# Patient Record
Sex: MALE | Race: WHITE | Employment: FULL TIME | URBAN - METROPOLITAN AREA
[De-identification: names, ages, dates, MRNs, and addresses within clinical notes are randomized per-mention and may not be internally consistent; named-entity substitution may affect disease eponyms.]

---

## 2017-01-09 LAB
CHOLESTEROL, TOTAL: 236 MG/DL
CHOLESTEROL/HDL RATIO: ABNORMAL
HBA1C MFR BLD: 5.7 %
HDLC SERPL-MCNC: 40 MG/DL (ref 35–70)
LDL CHOLESTEROL CALCULATED: 161 MG/DL (ref 0–160)
TRIGL SERPL-MCNC: 174 MG/DL
VLDLC SERPL CALC-MCNC: 35 MG/DL

## 2017-03-13 ENCOUNTER — OFFICE VISIT (OUTPATIENT)
Dept: PRIMARY CARE CLINIC | Age: 47
End: 2017-03-13

## 2017-03-13 VITALS
OXYGEN SATURATION: 98 % | SYSTOLIC BLOOD PRESSURE: 154 MMHG | HEART RATE: 86 BPM | RESPIRATION RATE: 18 BRPM | DIASTOLIC BLOOD PRESSURE: 102 MMHG | BODY MASS INDEX: 32.44 KG/M2 | WEIGHT: 219 LBS | TEMPERATURE: 97.5 F | HEIGHT: 69 IN

## 2017-03-13 DIAGNOSIS — M25.551 HIP PAIN, RIGHT: ICD-10-CM

## 2017-03-13 DIAGNOSIS — I10 ESSENTIAL HYPERTENSION: Primary | ICD-10-CM

## 2017-03-13 PROCEDURE — 99214 OFFICE O/P EST MOD 30 MIN: CPT | Performed by: INTERNAL MEDICINE

## 2017-03-13 PROCEDURE — G8417 CALC BMI ABV UP PARAM F/U: HCPCS | Performed by: INTERNAL MEDICINE

## 2017-03-13 PROCEDURE — 4004F PT TOBACCO SCREEN RCVD TLK: CPT | Performed by: INTERNAL MEDICINE

## 2017-03-13 PROCEDURE — G8484 FLU IMMUNIZE NO ADMIN: HCPCS | Performed by: INTERNAL MEDICINE

## 2017-03-13 PROCEDURE — G8427 DOCREV CUR MEDS BY ELIG CLIN: HCPCS | Performed by: INTERNAL MEDICINE

## 2017-03-13 RX ORDER — OXYCODONE HYDROCHLORIDE AND ACETAMINOPHEN 5; 325 MG/1; MG/1
1 TABLET ORAL EVERY 6 HOURS PRN
Qty: 28 TABLET | Refills: 0 | Status: SHIPPED | OUTPATIENT
Start: 2017-03-13 | End: 2017-03-20

## 2017-03-13 RX ORDER — TELMISARTAN AND HYDROCHLORTHIAZIDE 80; 12.5 MG/1; MG/1
1 TABLET ORAL DAILY
Qty: 90 TABLET | Refills: 3 | Status: SHIPPED | OUTPATIENT
Start: 2017-03-13 | End: 2017-06-02 | Stop reason: SDUPTHER

## 2017-03-15 DIAGNOSIS — J06.9 UPPER RESPIRATORY TRACT INFECTION, UNSPECIFIED TYPE: ICD-10-CM

## 2017-03-15 RX ORDER — AZITHROMYCIN 250 MG/1
TABLET, FILM COATED ORAL
Qty: 1 PACKET | Refills: 0 | Status: SHIPPED | OUTPATIENT
Start: 2017-03-15 | End: 2017-03-25

## 2017-03-15 RX ORDER — AZITHROMYCIN 250 MG/1
TABLET, FILM COATED ORAL
Qty: 1 PACKET | Refills: 0 | OUTPATIENT
Start: 2017-03-15 | End: 2017-03-25

## 2017-03-17 ENCOUNTER — TELEPHONE (OUTPATIENT)
Dept: PRIMARY CARE CLINIC | Age: 47
End: 2017-03-17

## 2017-03-26 ASSESSMENT — ENCOUNTER SYMPTOMS
NAUSEA: 0
TROUBLE SWALLOWING: 0
PHOTOPHOBIA: 0
SHORTNESS OF BREATH: 0
CHOKING: 0
BLURRED VISION: 0
VOICE CHANGE: 0
VOMITING: 0

## 2017-03-28 ENCOUNTER — HOSPITAL ENCOUNTER (OUTPATIENT)
Dept: ORTHOPEDIC SURGERY | Age: 47
Discharge: HOME | End: 2017-03-28
Payer: COMMERCIAL

## 2017-03-28 DIAGNOSIS — M25.552 PAIN IN JOINT, PELVIC REGION AND THIGH, LEFT: ICD-10-CM

## 2017-03-28 PROCEDURE — 73502 X-RAY EXAM HIP UNI 2-3 VIEWS: CPT

## 2017-05-04 ENCOUNTER — HOSPITAL ENCOUNTER (OUTPATIENT)
Dept: PREADMISSION TESTING | Age: 47
Discharge: HOME | End: 2017-05-04
Payer: COMMERCIAL

## 2017-05-04 VITALS
BODY MASS INDEX: 30.99 KG/M2 | DIASTOLIC BLOOD PRESSURE: 80 MMHG | SYSTOLIC BLOOD PRESSURE: 136 MMHG | HEART RATE: 104 BPM | OXYGEN SATURATION: 95 % | TEMPERATURE: 98.2 F | WEIGHT: 209.25 LBS | HEIGHT: 69 IN | RESPIRATION RATE: 16 BRPM

## 2017-05-04 LAB
ABO/RH: NORMAL
ANION GAP SERPL CALCULATED.3IONS-SCNC: 11 MEQ/L (ref 7–13)
ANTIBODY SCREEN: NORMAL
BILIRUBIN URINE: NEGATIVE
BLOOD, URINE: NEGATIVE
BUN BLDV-MCNC: 15 MG/DL (ref 6–20)
CALCIUM SERPL-MCNC: 9.4 MG/DL (ref 8.6–10.2)
CHLORIDE BLD-SCNC: 99 MEQ/L (ref 98–107)
CLARITY: CLEAR
CO2: 28 MEQ/L (ref 22–29)
COLOR: YELLOW
CREAT SERPL-MCNC: 1.24 MG/DL (ref 0.7–1.2)
GFR AFRICAN AMERICAN: >60
GFR NON-AFRICAN AMERICAN: >60
GLUCOSE BLD-MCNC: 85 MG/DL (ref 74–109)
GLUCOSE URINE: NEGATIVE MG/DL
HCT VFR BLD CALC: 54.8 % (ref 42–52)
HEMOGLOBIN: 18.1 G/DL (ref 14–18)
KETONES, URINE: NEGATIVE MG/DL
LEUKOCYTE ESTERASE, URINE: NEGATIVE
MCH RBC QN AUTO: 29.9 PG (ref 27–31.3)
MCHC RBC AUTO-ENTMCNC: 33 % (ref 33–37)
MCV RBC AUTO: 90.8 FL (ref 80–100)
NITRITE, URINE: NEGATIVE
PDW BLD-RTO: 13.6 % (ref 11.5–14.5)
PH UA: 7 (ref 5–9)
PLATELET # BLD: 216 K/UL (ref 130–400)
POTASSIUM SERPL-SCNC: 4.7 MEQ/L (ref 3.5–5.1)
PROTEIN UA: NEGATIVE MG/DL
RBC # BLD: 6.04 M/UL (ref 4.7–6.1)
SODIUM BLD-SCNC: 138 MEQ/L (ref 132–144)
SPECIFIC GRAVITY UA: 1.01 (ref 1–1.03)
URINE REFLEX TO CULTURE: NORMAL
UROBILINOGEN, URINE: 0.2 E.U./DL
WBC # BLD: 7.6 K/UL (ref 4.8–10.8)

## 2017-05-04 PROCEDURE — 85027 COMPLETE CBC AUTOMATED: CPT

## 2017-05-04 PROCEDURE — 86901 BLOOD TYPING SEROLOGIC RH(D): CPT

## 2017-05-04 PROCEDURE — 80048 BASIC METABOLIC PNL TOTAL CA: CPT

## 2017-05-04 PROCEDURE — 93005 ELECTROCARDIOGRAM TRACING: CPT

## 2017-05-04 PROCEDURE — 86900 BLOOD TYPING SEROLOGIC ABO: CPT

## 2017-05-04 PROCEDURE — 86850 RBC ANTIBODY SCREEN: CPT

## 2017-05-04 PROCEDURE — 81003 URINALYSIS AUTO W/O SCOPE: CPT

## 2017-05-04 RX ORDER — TRAMADOL HYDROCHLORIDE 50 MG/1
50 TABLET ORAL EVERY 6 HOURS PRN
Status: ON HOLD | COMMUNITY
End: 2017-05-19 | Stop reason: HOSPADM

## 2017-05-04 RX ORDER — SODIUM CHLORIDE 0.9 % (FLUSH) 0.9 %
10 SYRINGE (ML) INJECTION EVERY 12 HOURS SCHEDULED
Status: CANCELLED | OUTPATIENT
Start: 2017-05-04

## 2017-05-04 RX ORDER — LIDOCAINE HYDROCHLORIDE 10 MG/ML
1 INJECTION, SOLUTION EPIDURAL; INFILTRATION; INTRACAUDAL; PERINEURAL
Status: CANCELLED | OUTPATIENT
Start: 2017-05-04 | End: 2017-05-04

## 2017-05-04 RX ORDER — SODIUM CHLORIDE, SODIUM LACTATE, POTASSIUM CHLORIDE, CALCIUM CHLORIDE 600; 310; 30; 20 MG/100ML; MG/100ML; MG/100ML; MG/100ML
INJECTION, SOLUTION INTRAVENOUS CONTINUOUS
Status: CANCELLED | OUTPATIENT
Start: 2017-05-04

## 2017-05-04 RX ORDER — CETIRIZINE HYDROCHLORIDE 10 MG/1
10 TABLET ORAL DAILY PRN
COMMUNITY
End: 2017-09-15

## 2017-05-04 RX ORDER — SODIUM CHLORIDE 0.9 % (FLUSH) 0.9 %
10 SYRINGE (ML) INJECTION PRN
Status: CANCELLED | OUTPATIENT
Start: 2017-05-04

## 2017-05-05 ENCOUNTER — OFFICE VISIT (OUTPATIENT)
Dept: PRIMARY CARE CLINIC | Age: 47
End: 2017-05-05

## 2017-05-05 VITALS
WEIGHT: 211.9 LBS | TEMPERATURE: 97.6 F | HEART RATE: 78 BPM | HEIGHT: 69 IN | BODY MASS INDEX: 31.39 KG/M2 | SYSTOLIC BLOOD PRESSURE: 148 MMHG | DIASTOLIC BLOOD PRESSURE: 86 MMHG | RESPIRATION RATE: 14 BRPM

## 2017-05-05 DIAGNOSIS — M25.551 HIP PAIN, RIGHT: Primary | ICD-10-CM

## 2017-05-05 DIAGNOSIS — I10 ESSENTIAL HYPERTENSION: ICD-10-CM

## 2017-05-05 DIAGNOSIS — Z01.818 PRE-OP EVALUATION: ICD-10-CM

## 2017-05-05 PROCEDURE — 4004F PT TOBACCO SCREEN RCVD TLK: CPT | Performed by: INTERNAL MEDICINE

## 2017-05-05 PROCEDURE — 99213 OFFICE O/P EST LOW 20 MIN: CPT | Performed by: INTERNAL MEDICINE

## 2017-05-05 PROCEDURE — G8427 DOCREV CUR MEDS BY ELIG CLIN: HCPCS | Performed by: INTERNAL MEDICINE

## 2017-05-05 PROCEDURE — G8417 CALC BMI ABV UP PARAM F/U: HCPCS | Performed by: INTERNAL MEDICINE

## 2017-05-05 ASSESSMENT — PATIENT HEALTH QUESTIONNAIRE - PHQ9
1. LITTLE INTEREST OR PLEASURE IN DOING THINGS: 0
SUM OF ALL RESPONSES TO PHQ QUESTIONS 1-9: 0
SUM OF ALL RESPONSES TO PHQ9 QUESTIONS 1 & 2: 0
2. FEELING DOWN, DEPRESSED OR HOPELESS: 0

## 2017-05-05 ASSESSMENT — ENCOUNTER SYMPTOMS
CHOKING: 0
VOICE CHANGE: 0
TROUBLE SWALLOWING: 0
PHOTOPHOBIA: 0
SHORTNESS OF BREATH: 0
VOMITING: 0
NAUSEA: 0

## 2017-05-06 LAB
EKG ATRIAL RATE: 73 BPM
EKG P AXIS: 39 DEGREES
EKG P-R INTERVAL: 142 MS
EKG Q-T INTERVAL: 350 MS
EKG QRS DURATION: 92 MS
EKG QTC CALCULATION (BAZETT): 385 MS
EKG R AXIS: 28 DEGREES
EKG T AXIS: 43 DEGREES
EKG VENTRICULAR RATE: 73 BPM

## 2017-05-18 ENCOUNTER — PREP FOR PROCEDURE (OUTPATIENT)
Dept: ORTHOPEDIC SURGERY | Age: 47
End: 2017-05-18

## 2017-05-18 ENCOUNTER — ANESTHESIA (OUTPATIENT)
Dept: OPERATING ROOM | Age: 47
DRG: 470 | End: 2017-05-18
Payer: COMMERCIAL

## 2017-05-18 ENCOUNTER — APPOINTMENT (OUTPATIENT)
Dept: GENERAL RADIOLOGY | Age: 47
DRG: 470 | End: 2017-05-18
Attending: ORTHOPAEDIC SURGERY
Payer: COMMERCIAL

## 2017-05-18 ENCOUNTER — ANESTHESIA EVENT (OUTPATIENT)
Dept: OPERATING ROOM | Age: 47
DRG: 470 | End: 2017-05-18
Payer: COMMERCIAL

## 2017-05-18 ENCOUNTER — HOSPITAL ENCOUNTER (INPATIENT)
Age: 47
LOS: 1 days | Discharge: HOME | DRG: 470 | End: 2017-05-19
Attending: ORTHOPAEDIC SURGERY | Admitting: ORTHOPAEDIC SURGERY
Payer: COMMERCIAL

## 2017-05-18 VITALS — DIASTOLIC BLOOD PRESSURE: 48 MMHG | OXYGEN SATURATION: 99 % | SYSTOLIC BLOOD PRESSURE: 99 MMHG

## 2017-05-18 DIAGNOSIS — N17.9 AKI (ACUTE KIDNEY INJURY) (HCC): Primary | ICD-10-CM

## 2017-05-18 LAB
ABO/RH: NORMAL
ABO/RH: NORMAL
ANTIBODY SCREEN: NORMAL

## 2017-05-18 PROCEDURE — 87086 URINE CULTURE/COLONY COUNT: CPT

## 2017-05-18 PROCEDURE — 2580000003 HC RX 258: Performed by: ORTHOPAEDIC SURGERY

## 2017-05-18 PROCEDURE — 3600000004 HC SURGERY LEVEL 4 BASE: Performed by: ORTHOPAEDIC SURGERY

## 2017-05-18 PROCEDURE — 7100000001 HC PACU RECOVERY - ADDTL 15 MIN: Performed by: ORTHOPAEDIC SURGERY

## 2017-05-18 PROCEDURE — 1210000000 HC MED SURG R&B

## 2017-05-18 PROCEDURE — G8988 SELF CARE GOAL STATUS: HCPCS

## 2017-05-18 PROCEDURE — 2580000003 HC RX 258: Performed by: NURSE PRACTITIONER

## 2017-05-18 PROCEDURE — C1776 JOINT DEVICE (IMPLANTABLE): HCPCS | Performed by: ORTHOPAEDIC SURGERY

## 2017-05-18 PROCEDURE — 3600000014 HC SURGERY LEVEL 4 ADDTL 15MIN: Performed by: ORTHOPAEDIC SURGERY

## 2017-05-18 PROCEDURE — 0SR904A REPLACEMENT OF RIGHT HIP JOINT WITH CERAMIC ON POLYETHYLENE SYNTHETIC SUBSTITUTE, UNCEMENTED, OPEN APPROACH: ICD-10-PCS | Performed by: ORTHOPAEDIC SURGERY

## 2017-05-18 PROCEDURE — G8979 MOBILITY GOAL STATUS: HCPCS

## 2017-05-18 PROCEDURE — 93005 ELECTROCARDIOGRAM TRACING: CPT

## 2017-05-18 PROCEDURE — 86850 RBC ANTIBODY SCREEN: CPT

## 2017-05-18 PROCEDURE — 6360000002 HC RX W HCPCS: Performed by: ANESTHESIOLOGY

## 2017-05-18 PROCEDURE — G8987 SELF CARE CURRENT STATUS: HCPCS

## 2017-05-18 PROCEDURE — 6360000002 HC RX W HCPCS: Performed by: ORTHOPAEDIC SURGERY

## 2017-05-18 PROCEDURE — 7100000000 HC PACU RECOVERY - FIRST 15 MIN: Performed by: ORTHOPAEDIC SURGERY

## 2017-05-18 PROCEDURE — 2500000003 HC RX 250 WO HCPCS: Performed by: NURSE PRACTITIONER

## 2017-05-18 PROCEDURE — 2500000003 HC RX 250 WO HCPCS: Performed by: ORTHOPAEDIC SURGERY

## 2017-05-18 PROCEDURE — 2580000003 HC RX 258: Performed by: STUDENT IN AN ORGANIZED HEALTH CARE EDUCATION/TRAINING PROGRAM

## 2017-05-18 PROCEDURE — 73501 X-RAY EXAM HIP UNI 1 VIEW: CPT

## 2017-05-18 PROCEDURE — 86923 COMPATIBILITY TEST ELECTRIC: CPT

## 2017-05-18 PROCEDURE — 2500000003 HC RX 250 WO HCPCS: Performed by: ANESTHESIOLOGY

## 2017-05-18 PROCEDURE — 6370000000 HC RX 637 (ALT 250 FOR IP): Performed by: ORTHOPAEDIC SURGERY

## 2017-05-18 PROCEDURE — 6360000002 HC RX W HCPCS: Performed by: PHYSICIAN ASSISTANT

## 2017-05-18 PROCEDURE — 86900 BLOOD TYPING SEROLOGIC ABO: CPT

## 2017-05-18 PROCEDURE — 97166 OT EVAL MOD COMPLEX 45 MIN: CPT

## 2017-05-18 PROCEDURE — 3700000001 HC ADD 15 MINUTES (ANESTHESIA): Performed by: ORTHOPAEDIC SURGERY

## 2017-05-18 PROCEDURE — 2500000003 HC RX 250 WO HCPCS: Performed by: STUDENT IN AN ORGANIZED HEALTH CARE EDUCATION/TRAINING PROGRAM

## 2017-05-18 PROCEDURE — 86901 BLOOD TYPING SEROLOGIC RH(D): CPT

## 2017-05-18 PROCEDURE — 6360000002 HC RX W HCPCS: Performed by: NURSE PRACTITIONER

## 2017-05-18 PROCEDURE — G8978 MOBILITY CURRENT STATUS: HCPCS

## 2017-05-18 PROCEDURE — 3700000000 HC ANESTHESIA ATTENDED CARE: Performed by: ORTHOPAEDIC SURGERY

## 2017-05-18 PROCEDURE — 97161 PT EVAL LOW COMPLEX 20 MIN: CPT

## 2017-05-18 PROCEDURE — 2720619600 HC DRESSING WOUND 2X2IN PAD CALC ALGINATE: Performed by: ORTHOPAEDIC SURGERY

## 2017-05-18 DEVICE — INSERT ACET OD52MM ID28MM HIP X3 2 MOBILITY RESTR MOB BEAR: Type: IMPLANTABLE DEVICE | Site: HIP | Status: FUNCTIONAL

## 2017-05-18 DEVICE — STEM FEM SZ 5 L108MM NK L35MM 44MM OFFSET 127DEG HIP TI: Type: IMPLANTABLE DEVICE | Site: HIP | Status: FUNCTIONAL

## 2017-05-18 DEVICE — LINER ACET SZ F ID46MM HIP X3 CO CHROM CEMENTLESS MOD 2: Type: IMPLANTABLE DEVICE | Site: HIP | Status: FUNCTIONAL

## 2017-05-18 DEVICE — SHELL ACET SZ F DIA56MM HIP TRIDENT HA CLUS H HMSPHR MOD 2: Type: IMPLANTABLE DEVICE | Site: HIP | Status: FUNCTIONAL

## 2017-05-18 DEVICE — HEAD FEM DIA28MM +0MM OFFSET HIP BIOLOX DELT CERAMIC TAPR: Type: IMPLANTABLE DEVICE | Site: HIP | Status: FUNCTIONAL

## 2017-05-18 RX ORDER — MIDAZOLAM HYDROCHLORIDE 1 MG/ML
INJECTION INTRAMUSCULAR; INTRAVENOUS PRN
Status: DISCONTINUED | OUTPATIENT
Start: 2017-05-18 | End: 2017-05-18 | Stop reason: SDUPTHER

## 2017-05-18 RX ORDER — ACETAMINOPHEN 325 MG/1
325 TABLET ORAL EVERY 4 HOURS PRN
Status: DISCONTINUED | OUTPATIENT
Start: 2017-05-18 | End: 2017-05-18 | Stop reason: HOSPADM

## 2017-05-18 RX ORDER — FENTANYL CITRATE 50 UG/ML
50 INJECTION, SOLUTION INTRAMUSCULAR; INTRAVENOUS EVERY 10 MIN PRN
Status: DISCONTINUED | OUTPATIENT
Start: 2017-05-18 | End: 2017-05-18 | Stop reason: HOSPADM

## 2017-05-18 RX ORDER — ONDANSETRON 2 MG/ML
4 INJECTION INTRAMUSCULAR; INTRAVENOUS
Status: COMPLETED | OUTPATIENT
Start: 2017-05-18 | End: 2017-05-18

## 2017-05-18 RX ORDER — DIPHENHYDRAMINE HYDROCHLORIDE 50 MG/ML
50 INJECTION INTRAMUSCULAR; INTRAVENOUS EVERY 6 HOURS PRN
Status: DISCONTINUED | OUTPATIENT
Start: 2017-05-18 | End: 2017-05-19 | Stop reason: HOSPADM

## 2017-05-18 RX ORDER — METOCLOPRAMIDE HYDROCHLORIDE 5 MG/ML
10 INJECTION INTRAMUSCULAR; INTRAVENOUS
Status: DISCONTINUED | OUTPATIENT
Start: 2017-05-18 | End: 2017-05-18 | Stop reason: HOSPADM

## 2017-05-18 RX ORDER — SENNA AND DOCUSATE SODIUM 50; 8.6 MG/1; MG/1
1 TABLET, FILM COATED ORAL DAILY
Status: DISCONTINUED | OUTPATIENT
Start: 2017-05-18 | End: 2017-05-19 | Stop reason: HOSPADM

## 2017-05-18 RX ORDER — SODIUM CHLORIDE 0.9 % (FLUSH) 0.9 %
10 SYRINGE (ML) INJECTION PRN
Status: DISCONTINUED | OUTPATIENT
Start: 2017-05-18 | End: 2017-05-18 | Stop reason: HOSPADM

## 2017-05-18 RX ORDER — DOCUSATE SODIUM 100 MG/1
100 CAPSULE, LIQUID FILLED ORAL 2 TIMES DAILY
Status: DISCONTINUED | OUTPATIENT
Start: 2017-05-18 | End: 2017-05-19 | Stop reason: HOSPADM

## 2017-05-18 RX ORDER — SODIUM CHLORIDE 0.9 % (FLUSH) 0.9 %
10 SYRINGE (ML) INJECTION EVERY 12 HOURS SCHEDULED
Status: DISCONTINUED | OUTPATIENT
Start: 2017-05-18 | End: 2017-05-18 | Stop reason: HOSPADM

## 2017-05-18 RX ORDER — ONDANSETRON 2 MG/ML
4 INJECTION INTRAMUSCULAR; INTRAVENOUS EVERY 6 HOURS PRN
Status: CANCELLED | OUTPATIENT
Start: 2017-05-18

## 2017-05-18 RX ORDER — SENNA AND DOCUSATE SODIUM 50; 8.6 MG/1; MG/1
1 TABLET, FILM COATED ORAL DAILY
Status: CANCELLED | OUTPATIENT
Start: 2017-05-18

## 2017-05-18 RX ORDER — MORPHINE SULFATE 2 MG/ML
4 INJECTION, SOLUTION INTRAMUSCULAR; INTRAVENOUS
Status: CANCELLED | OUTPATIENT
Start: 2017-05-18

## 2017-05-18 RX ORDER — HYDRALAZINE HYDROCHLORIDE 20 MG/ML
10 INJECTION INTRAMUSCULAR; INTRAVENOUS EVERY 4 HOURS PRN
Status: DISCONTINUED | OUTPATIENT
Start: 2017-05-18 | End: 2017-05-19 | Stop reason: HOSPADM

## 2017-05-18 RX ORDER — PHENYLEPHRINE HYDROCHLORIDE 10 MG/ML
INJECTION INTRAVENOUS PRN
Status: DISCONTINUED | OUTPATIENT
Start: 2017-05-18 | End: 2017-05-18 | Stop reason: SDUPTHER

## 2017-05-18 RX ORDER — FLUTICASONE PROPIONATE 50 MCG
1 SPRAY, SUSPENSION (ML) NASAL DAILY
Status: DISCONTINUED | OUTPATIENT
Start: 2017-05-18 | End: 2017-05-19 | Stop reason: HOSPADM

## 2017-05-18 RX ORDER — SODIUM CHLORIDE, SODIUM LACTATE, POTASSIUM CHLORIDE, CALCIUM CHLORIDE 600; 310; 30; 20 MG/100ML; MG/100ML; MG/100ML; MG/100ML
INJECTION, SOLUTION INTRAVENOUS CONTINUOUS
Status: DISCONTINUED | OUTPATIENT
Start: 2017-05-18 | End: 2017-05-18

## 2017-05-18 RX ORDER — BUPIVACAINE HYDROCHLORIDE 5 MG/ML
INJECTION, SOLUTION EPIDURAL; INTRACAUDAL PRN
Status: DISCONTINUED | OUTPATIENT
Start: 2017-05-18 | End: 2017-05-18 | Stop reason: SDUPTHER

## 2017-05-18 RX ORDER — PROPOFOL 10 MG/ML
INJECTION, EMULSION INTRAVENOUS CONTINUOUS PRN
Status: DISCONTINUED | OUTPATIENT
Start: 2017-05-18 | End: 2017-05-18 | Stop reason: SDUPTHER

## 2017-05-18 RX ORDER — LIDOCAINE HYDROCHLORIDE 10 MG/ML
1 INJECTION, SOLUTION EPIDURAL; INFILTRATION; INTRACAUDAL; PERINEURAL
Status: COMPLETED | OUTPATIENT
Start: 2017-05-18 | End: 2017-05-18

## 2017-05-18 RX ORDER — NALOXONE HYDROCHLORIDE 0.4 MG/ML
0.4 INJECTION, SOLUTION INTRAMUSCULAR; INTRAVENOUS; SUBCUTANEOUS PRN
Status: DISCONTINUED | OUTPATIENT
Start: 2017-05-18 | End: 2017-05-18

## 2017-05-18 RX ORDER — KETOROLAC TROMETHAMINE 30 MG/ML
30 INJECTION, SOLUTION INTRAMUSCULAR; INTRAVENOUS EVERY 6 HOURS
Status: COMPLETED | OUTPATIENT
Start: 2017-05-18 | End: 2017-05-19

## 2017-05-18 RX ORDER — OXYCODONE HYDROCHLORIDE AND ACETAMINOPHEN 5; 325 MG/1; MG/1
1 TABLET ORAL EVERY 4 HOURS PRN
Status: CANCELLED | OUTPATIENT
Start: 2017-05-18

## 2017-05-18 RX ORDER — ACETAMINOPHEN 325 MG/1
500 TABLET ORAL EVERY 6 HOURS PRN
Status: CANCELLED | OUTPATIENT
Start: 2017-05-18

## 2017-05-18 RX ORDER — MORPHINE SULFATE 4 MG/ML
4 INJECTION, SOLUTION INTRAMUSCULAR; INTRAVENOUS
Status: DISCONTINUED | OUTPATIENT
Start: 2017-05-18 | End: 2017-05-19

## 2017-05-18 RX ORDER — ONDANSETRON 2 MG/ML
4 INJECTION INTRAMUSCULAR; INTRAVENOUS EVERY 6 HOURS PRN
Status: DISCONTINUED | OUTPATIENT
Start: 2017-05-18 | End: 2017-05-18 | Stop reason: HOSPADM

## 2017-05-18 RX ORDER — OXYCODONE HYDROCHLORIDE AND ACETAMINOPHEN 5; 325 MG/1; MG/1
1 TABLET ORAL EVERY 4 HOURS PRN
Status: DISCONTINUED | OUTPATIENT
Start: 2017-05-18 | End: 2017-05-19 | Stop reason: HOSPADM

## 2017-05-18 RX ORDER — HYDROCODONE BITARTRATE AND ACETAMINOPHEN 5; 325 MG/1; MG/1
1 TABLET ORAL PRN
Status: DISCONTINUED | OUTPATIENT
Start: 2017-05-18 | End: 2017-05-18 | Stop reason: HOSPADM

## 2017-05-18 RX ORDER — DIPHENHYDRAMINE HYDROCHLORIDE 50 MG/ML
12.5 INJECTION INTRAMUSCULAR; INTRAVENOUS
Status: DISCONTINUED | OUTPATIENT
Start: 2017-05-18 | End: 2017-05-18 | Stop reason: HOSPADM

## 2017-05-18 RX ORDER — DOCUSATE SODIUM 100 MG/1
100 CAPSULE, LIQUID FILLED ORAL 2 TIMES DAILY
Status: CANCELLED | OUTPATIENT
Start: 2017-05-18

## 2017-05-18 RX ORDER — MORPHINE SULFATE 2 MG/ML
2 INJECTION, SOLUTION INTRAMUSCULAR; INTRAVENOUS
Status: CANCELLED | OUTPATIENT
Start: 2017-05-18

## 2017-05-18 RX ORDER — HYDROCODONE BITARTRATE AND ACETAMINOPHEN 5; 325 MG/1; MG/1
2 TABLET ORAL PRN
Status: DISCONTINUED | OUTPATIENT
Start: 2017-05-18 | End: 2017-05-18 | Stop reason: HOSPADM

## 2017-05-18 RX ORDER — ASPIRIN 81 MG/1
81 TABLET ORAL 2 TIMES DAILY
Status: DISCONTINUED | OUTPATIENT
Start: 2017-05-18 | End: 2017-05-19 | Stop reason: HOSPADM

## 2017-05-18 RX ORDER — SODIUM CHLORIDE 9 MG/ML
INJECTION, SOLUTION INTRAVENOUS CONTINUOUS
Status: DISCONTINUED | OUTPATIENT
Start: 2017-05-18 | End: 2017-05-19 | Stop reason: HOSPADM

## 2017-05-18 RX ORDER — MORPHINE SULFATE 2 MG/ML
2 INJECTION, SOLUTION INTRAMUSCULAR; INTRAVENOUS
Status: DISCONTINUED | OUTPATIENT
Start: 2017-05-18 | End: 2017-05-19

## 2017-05-18 RX ORDER — PROPOFOL 10 MG/ML
INJECTION, EMULSION INTRAVENOUS PRN
Status: DISCONTINUED | OUTPATIENT
Start: 2017-05-18 | End: 2017-05-18 | Stop reason: SDUPTHER

## 2017-05-18 RX ORDER — SODIUM CHLORIDE 9 MG/ML
INJECTION, SOLUTION INTRAVENOUS CONTINUOUS
Status: CANCELLED | OUTPATIENT
Start: 2017-05-18

## 2017-05-18 RX ORDER — ONDANSETRON 2 MG/ML
4 INJECTION INTRAMUSCULAR; INTRAVENOUS EVERY 6 HOURS PRN
Status: DISCONTINUED | OUTPATIENT
Start: 2017-05-18 | End: 2017-05-19 | Stop reason: HOSPADM

## 2017-05-18 RX ORDER — NALBUPHINE HCL 10 MG/ML
5 AMPUL (ML) INJECTION EVERY 4 HOURS PRN
Status: DISCONTINUED | OUTPATIENT
Start: 2017-05-18 | End: 2017-05-18 | Stop reason: HOSPADM

## 2017-05-18 RX ORDER — KETOROLAC TROMETHAMINE 30 MG/ML
30 INJECTION, SOLUTION INTRAMUSCULAR; INTRAVENOUS EVERY 6 HOURS
Status: DISCONTINUED | OUTPATIENT
Start: 2017-05-18 | End: 2017-05-18 | Stop reason: HOSPADM

## 2017-05-18 RX ORDER — GLYCOPYRROLATE 0.2 MG/ML
0.2 INJECTION INTRAMUSCULAR; INTRAVENOUS ONCE
Status: COMPLETED | OUTPATIENT
Start: 2017-05-18 | End: 2017-05-18

## 2017-05-18 RX ORDER — SODIUM CHLORIDE 0.9 % (FLUSH) 0.9 %
10 SYRINGE (ML) INJECTION EVERY 12 HOURS SCHEDULED
Status: DISCONTINUED | OUTPATIENT
Start: 2017-05-18 | End: 2017-05-19 | Stop reason: HOSPADM

## 2017-05-18 RX ORDER — CETIRIZINE HYDROCHLORIDE 10 MG/1
10 TABLET ORAL DAILY PRN
Status: DISCONTINUED | OUTPATIENT
Start: 2017-05-18 | End: 2017-05-19 | Stop reason: HOSPADM

## 2017-05-18 RX ORDER — MORPHINE SULFATE 1 MG/ML
INJECTION, SOLUTION EPIDURAL; INTRATHECAL; INTRAVENOUS PRN
Status: DISCONTINUED | OUTPATIENT
Start: 2017-05-18 | End: 2017-05-18 | Stop reason: SDUPTHER

## 2017-05-18 RX ORDER — MEPERIDINE HYDROCHLORIDE 25 MG/ML
12.5 INJECTION INTRAMUSCULAR; INTRAVENOUS; SUBCUTANEOUS EVERY 5 MIN PRN
Status: DISCONTINUED | OUTPATIENT
Start: 2017-05-18 | End: 2017-05-18 | Stop reason: HOSPADM

## 2017-05-18 RX ORDER — DIPHENHYDRAMINE HCL 25 MG
25 TABLET ORAL EVERY 6 HOURS PRN
Status: DISCONTINUED | OUTPATIENT
Start: 2017-05-18 | End: 2017-05-18 | Stop reason: HOSPADM

## 2017-05-18 RX ORDER — ACETAMINOPHEN 500 MG
500 TABLET ORAL EVERY 6 HOURS PRN
Status: DISCONTINUED | OUTPATIENT
Start: 2017-05-18 | End: 2017-05-19 | Stop reason: HOSPADM

## 2017-05-18 RX ORDER — SODIUM CHLORIDE 0.9 % (FLUSH) 0.9 %
10 SYRINGE (ML) INJECTION EVERY 12 HOURS SCHEDULED
Status: CANCELLED | OUTPATIENT
Start: 2017-05-18

## 2017-05-18 RX ORDER — SODIUM CHLORIDE 0.9 % (FLUSH) 0.9 %
10 SYRINGE (ML) INJECTION PRN
Status: CANCELLED | OUTPATIENT
Start: 2017-05-18

## 2017-05-18 RX ORDER — OXYCODONE HYDROCHLORIDE AND ACETAMINOPHEN 5; 325 MG/1; MG/1
2 TABLET ORAL EVERY 4 HOURS PRN
Status: DISCONTINUED | OUTPATIENT
Start: 2017-05-18 | End: 2017-05-19 | Stop reason: HOSPADM

## 2017-05-18 RX ORDER — SODIUM CHLORIDE 0.9 % (FLUSH) 0.9 %
10 SYRINGE (ML) INJECTION PRN
Status: DISCONTINUED | OUTPATIENT
Start: 2017-05-18 | End: 2017-05-19 | Stop reason: HOSPADM

## 2017-05-18 RX ORDER — OXYCODONE HYDROCHLORIDE AND ACETAMINOPHEN 5; 325 MG/1; MG/1
2 TABLET ORAL EVERY 4 HOURS PRN
Status: CANCELLED | OUTPATIENT
Start: 2017-05-18

## 2017-05-18 RX ADMIN — Medication 2 G: at 15:41

## 2017-05-18 RX ADMIN — SODIUM CHLORIDE, POTASSIUM CHLORIDE, SODIUM LACTATE AND CALCIUM CHLORIDE 1000 ML: 600; 310; 30; 20 INJECTION, SOLUTION INTRAVENOUS at 06:56

## 2017-05-18 RX ADMIN — Medication 2 G: at 07:38

## 2017-05-18 RX ADMIN — Medication 2 G: at 23:32

## 2017-05-18 RX ADMIN — ASPIRIN 81 MG: 81 TABLET, COATED ORAL at 20:58

## 2017-05-18 RX ADMIN — BUPIVACAINE HYDROCHLORIDE 3 ML: 5 INJECTION, SOLUTION EPIDURAL; INTRACAUDAL; PERINEURAL at 07:26

## 2017-05-18 RX ADMIN — SODIUM CHLORIDE: 9 INJECTION, SOLUTION INTRAVENOUS at 12:20

## 2017-05-18 RX ADMIN — KETOROLAC TROMETHAMINE 30 MG: 30 INJECTION, SOLUTION INTRAMUSCULAR at 12:57

## 2017-05-18 RX ADMIN — SODIUM CHLORIDE, PRESERVATIVE FREE 10 ML: 5 INJECTION INTRAVENOUS at 20:59

## 2017-05-18 RX ADMIN — Medication 0.2 MG: at 07:59

## 2017-05-18 RX ADMIN — Medication 0.2 MG: at 08:16

## 2017-05-18 RX ADMIN — Medication 0.2 MG: at 07:47

## 2017-05-18 RX ADMIN — DIPHENHYDRAMINE HYDROCHLORIDE 50 MG: 50 INJECTION, SOLUTION INTRAMUSCULAR; INTRAVENOUS at 12:21

## 2017-05-18 RX ADMIN — OXYCODONE HYDROCHLORIDE AND ACETAMINOPHEN 2 TABLET: 5; 325 TABLET ORAL at 16:18

## 2017-05-18 RX ADMIN — ONDANSETRON 4 MG: 2 INJECTION, SOLUTION INTRAMUSCULAR; INTRAVENOUS at 07:40

## 2017-05-18 RX ADMIN — POLYMYXIN B: 500000 INJECTION, POWDER, LYOPHILIZED, FOR SOLUTION INTRAMUSCULAR; INTRATHECAL; INTRAVENOUS; OPHTHALMIC at 07:45

## 2017-05-18 RX ADMIN — TRANEXAMIC ACID 1 G: 1 INJECTION, SOLUTION INTRAVENOUS at 07:32

## 2017-05-18 RX ADMIN — KETOROLAC TROMETHAMINE 30 MG: 30 INJECTION, SOLUTION INTRAMUSCULAR at 19:05

## 2017-05-18 RX ADMIN — PROPOFOL 100 MCG/KG/MIN: 10 INJECTION, EMULSION INTRAVENOUS at 07:40

## 2017-05-18 RX ADMIN — PHENYLEPHRINE HYDROCHLORIDE 200 MCG: 10 INJECTION INTRAVENOUS at 08:20

## 2017-05-18 RX ADMIN — OXYCODONE HYDROCHLORIDE AND ACETAMINOPHEN 2 TABLET: 5; 325 TABLET ORAL at 12:23

## 2017-05-18 RX ADMIN — DOCUSATE SODIUM AND SENNOSIDES 1 TABLET: 8.6; 5 TABLET, FILM COATED ORAL at 17:30

## 2017-05-18 RX ADMIN — DOCUSATE SODIUM 100 MG: 100 CAPSULE ORAL at 20:58

## 2017-05-18 RX ADMIN — TRANEXAMIC ACID 1000 MG: 1 INJECTION, SOLUTION INTRAVENOUS at 09:50

## 2017-05-18 RX ADMIN — GLYCOPYRROLATE 0.2 MG: 0.2 INJECTION INTRAMUSCULAR; INTRAVENOUS at 09:38

## 2017-05-18 RX ADMIN — Medication 0.2 MG: at 08:08

## 2017-05-18 RX ADMIN — ASPIRIN 81 MG: 81 TABLET, COATED ORAL at 17:31

## 2017-05-18 RX ADMIN — OXYCODONE HYDROCHLORIDE AND ACETAMINOPHEN 2 TABLET: 5; 325 TABLET ORAL at 20:58

## 2017-05-18 RX ADMIN — PROPOFOL 80 MG: 10 INJECTION, EMULSION INTRAVENOUS at 07:33

## 2017-05-18 RX ADMIN — MIDAZOLAM HYDROCHLORIDE 2 MG: 1 INJECTION, SOLUTION INTRAMUSCULAR; INTRAVENOUS at 07:24

## 2017-05-18 RX ADMIN — DOCUSATE SODIUM 100 MG: 100 CAPSULE ORAL at 17:31

## 2017-05-18 RX ADMIN — MORPHINE SULFATE 0.2 MG: 1 INJECTION EPIDURAL; INTRATHECAL; INTRAVENOUS at 07:26

## 2017-05-18 RX ADMIN — FENTANYL CITRATE 100 MCG: 50 INJECTION, SOLUTION INTRAMUSCULAR; INTRAVENOUS at 07:42

## 2017-05-18 RX ADMIN — SODIUM CHLORIDE, POTASSIUM CHLORIDE, SODIUM LACTATE AND CALCIUM CHLORIDE: 600; 310; 30; 20 INJECTION, SOLUTION INTRAVENOUS at 07:49

## 2017-05-18 RX ADMIN — LIDOCAINE HYDROCHLORIDE 1 ML: 10 INJECTION, SOLUTION EPIDURAL; INFILTRATION; INTRACAUDAL; PERINEURAL at 06:55

## 2017-05-18 ASSESSMENT — PAIN SCALES - GENERAL
PAINLEVEL_OUTOF10: 4
PAINLEVEL_OUTOF10: 0
PAINLEVEL_OUTOF10: 4
PAINLEVEL_OUTOF10: 7
PAINLEVEL_OUTOF10: 0
PAINLEVEL_OUTOF10: 7
PAINLEVEL_OUTOF10: 6

## 2017-05-18 ASSESSMENT — PAIN DESCRIPTION - FREQUENCY
FREQUENCY: CONTINUOUS
FREQUENCY: INTERMITTENT

## 2017-05-18 ASSESSMENT — PAIN DESCRIPTION - LOCATION
LOCATION: HIP
LOCATION: HIP

## 2017-05-18 ASSESSMENT — PAIN DESCRIPTION - PROGRESSION
CLINICAL_PROGRESSION: GRADUALLY WORSENING
CLINICAL_PROGRESSION: NOT CHANGED

## 2017-05-18 ASSESSMENT — PAIN DESCRIPTION - ONSET: ONSET: ON-GOING

## 2017-05-18 ASSESSMENT — PAIN DESCRIPTION - DESCRIPTORS
DESCRIPTORS: ACHING;BURNING;CONSTANT
DESCRIPTORS: CONSTANT;PENETRATING
DESCRIPTORS: ACHING

## 2017-05-18 ASSESSMENT — PAIN DESCRIPTION - ORIENTATION
ORIENTATION: RIGHT
ORIENTATION: RIGHT

## 2017-05-18 ASSESSMENT — PAIN - FUNCTIONAL ASSESSMENT: PAIN_FUNCTIONAL_ASSESSMENT: 0-10

## 2017-05-18 ASSESSMENT — PAIN DESCRIPTION - PAIN TYPE
TYPE: SURGICAL PAIN
TYPE: SURGICAL PAIN

## 2017-05-19 VITALS
SYSTOLIC BLOOD PRESSURE: 157 MMHG | HEART RATE: 103 BPM | RESPIRATION RATE: 16 BRPM | DIASTOLIC BLOOD PRESSURE: 69 MMHG | HEIGHT: 69 IN | BODY MASS INDEX: 30.99 KG/M2 | TEMPERATURE: 99.5 F | OXYGEN SATURATION: 94 % | WEIGHT: 209.25 LBS

## 2017-05-19 LAB
ANION GAP SERPL CALCULATED.3IONS-SCNC: 12 MEQ/L (ref 7–13)
ANION GAP SERPL CALCULATED.3IONS-SCNC: 8 MEQ/L (ref 7–13)
BUN BLDV-MCNC: 17 MG/DL (ref 6–20)
BUN BLDV-MCNC: 18 MG/DL (ref 6–20)
CALCIUM SERPL-MCNC: 7.9 MG/DL (ref 8.6–10.2)
CALCIUM SERPL-MCNC: 8.2 MG/DL (ref 8.6–10.2)
CHLORIDE BLD-SCNC: 101 MEQ/L (ref 98–107)
CHLORIDE BLD-SCNC: 104 MEQ/L (ref 98–107)
CO2: 23 MEQ/L (ref 22–29)
CO2: 26 MEQ/L (ref 22–29)
CREAT SERPL-MCNC: 1.51 MG/DL (ref 0.7–1.2)
CREAT SERPL-MCNC: 1.55 MG/DL (ref 0.7–1.2)
GFR AFRICAN AMERICAN: 58.4
GFR AFRICAN AMERICAN: >60
GFR NON-AFRICAN AMERICAN: 48.2
GFR NON-AFRICAN AMERICAN: 49.7
GLUCOSE BLD-MCNC: 107 MG/DL (ref 74–109)
GLUCOSE BLD-MCNC: 109 MG/DL (ref 74–109)
HCT VFR BLD CALC: 44.3 % (ref 42–52)
HEMOGLOBIN: 15.1 G/DL (ref 14–18)
MAGNESIUM: 1.8 MG/DL (ref 1.7–2.3)
MCH RBC QN AUTO: 30.1 PG (ref 27–31.3)
MCHC RBC AUTO-ENTMCNC: 34 % (ref 33–37)
MCV RBC AUTO: 88.4 FL (ref 80–100)
PDW BLD-RTO: 13.9 % (ref 11.5–14.5)
PLATELET # BLD: 169 K/UL (ref 130–400)
POTASSIUM SERPL-SCNC: 4 MEQ/L (ref 3.5–5.1)
POTASSIUM SERPL-SCNC: 4.5 MEQ/L (ref 3.5–5.1)
RBC # BLD: 5.02 M/UL (ref 4.7–6.1)
SODIUM BLD-SCNC: 136 MEQ/L (ref 132–144)
SODIUM BLD-SCNC: 138 MEQ/L (ref 132–144)
URINE CULTURE, ROUTINE: NORMAL
WBC # BLD: 9.5 K/UL (ref 4.8–10.8)

## 2017-05-19 PROCEDURE — 97116 GAIT TRAINING THERAPY: CPT

## 2017-05-19 PROCEDURE — 97110 THERAPEUTIC EXERCISES: CPT

## 2017-05-19 PROCEDURE — 85027 COMPLETE CBC AUTOMATED: CPT

## 2017-05-19 PROCEDURE — 6370000000 HC RX 637 (ALT 250 FOR IP): Performed by: NURSE PRACTITIONER

## 2017-05-19 PROCEDURE — 6360000002 HC RX W HCPCS: Performed by: NURSE PRACTITIONER

## 2017-05-19 PROCEDURE — 36415 COLL VENOUS BLD VENIPUNCTURE: CPT

## 2017-05-19 PROCEDURE — 6370000000 HC RX 637 (ALT 250 FOR IP): Performed by: ORTHOPAEDIC SURGERY

## 2017-05-19 PROCEDURE — 97535 SELF CARE MNGMENT TRAINING: CPT

## 2017-05-19 PROCEDURE — 83735 ASSAY OF MAGNESIUM: CPT

## 2017-05-19 PROCEDURE — 80048 BASIC METABOLIC PNL TOTAL CA: CPT

## 2017-05-19 RX ORDER — TIZANIDINE 2 MG/1
2 TABLET ORAL EVERY 6 HOURS PRN
Qty: 24 TABLET | Refills: 0 | Status: SHIPPED | OUTPATIENT
Start: 2017-05-19 | End: 2017-09-15

## 2017-05-19 RX ORDER — OXYCODONE HYDROCHLORIDE AND ACETAMINOPHEN 5; 325 MG/1; MG/1
1 TABLET ORAL EVERY 4 HOURS PRN
Qty: 60 TABLET | Refills: 0 | Status: SHIPPED | OUTPATIENT
Start: 2017-05-19 | End: 2017-05-26

## 2017-05-19 RX ORDER — ASPIRIN 81 MG/1
81 TABLET ORAL 2 TIMES DAILY
Qty: 60 TABLET | Refills: 0 | Status: SHIPPED | OUTPATIENT
Start: 2017-05-19 | End: 2017-09-15

## 2017-05-19 RX ORDER — PSEUDOEPHEDRINE HCL 30 MG
100 TABLET ORAL 2 TIMES DAILY
Qty: 60 CAPSULE | Refills: 0 | Status: SHIPPED | OUTPATIENT
Start: 2017-05-19 | End: 2017-09-15

## 2017-05-19 RX ORDER — TIZANIDINE 2 MG/1
2 TABLET ORAL EVERY 6 HOURS PRN
Status: DISCONTINUED | OUTPATIENT
Start: 2017-05-19 | End: 2017-05-19 | Stop reason: HOSPADM

## 2017-05-19 RX ADMIN — OXYCODONE HYDROCHLORIDE AND ACETAMINOPHEN 2 TABLET: 5; 325 TABLET ORAL at 17:00

## 2017-05-19 RX ADMIN — OXYCODONE HYDROCHLORIDE AND ACETAMINOPHEN 2 TABLET: 5; 325 TABLET ORAL at 02:57

## 2017-05-19 RX ADMIN — OXYCODONE HYDROCHLORIDE AND ACETAMINOPHEN 2 TABLET: 5; 325 TABLET ORAL at 13:02

## 2017-05-19 RX ADMIN — TIZANIDINE 2 MG: 2 TABLET ORAL at 10:12

## 2017-05-19 RX ADMIN — DOCUSATE SODIUM AND SENNOSIDES 1 TABLET: 8.6; 5 TABLET, FILM COATED ORAL at 08:22

## 2017-05-19 RX ADMIN — ASPIRIN 81 MG: 81 TABLET, COATED ORAL at 08:22

## 2017-05-19 RX ADMIN — KETOROLAC TROMETHAMINE 30 MG: 30 INJECTION, SOLUTION INTRAMUSCULAR at 01:30

## 2017-05-19 RX ADMIN — OXYCODONE HYDROCHLORIDE AND ACETAMINOPHEN 2 TABLET: 5; 325 TABLET ORAL at 08:22

## 2017-05-19 RX ADMIN — DOCUSATE SODIUM 100 MG: 100 CAPSULE ORAL at 08:22

## 2017-05-19 ASSESSMENT — PAIN SCALES - GENERAL
PAINLEVEL_OUTOF10: 7
PAINLEVEL_OUTOF10: 9
PAINLEVEL_OUTOF10: 6
PAINLEVEL_OUTOF10: 4
PAINLEVEL_OUTOF10: 7

## 2017-05-19 ASSESSMENT — PAIN DESCRIPTION - LOCATION
LOCATION: HIP
LOCATION: HIP

## 2017-05-19 ASSESSMENT — PAIN DESCRIPTION - PAIN TYPE
TYPE: ACUTE PAIN;SURGICAL PAIN
TYPE: ACUTE PAIN;SURGICAL PAIN

## 2017-05-19 ASSESSMENT — PAIN DESCRIPTION - DESCRIPTORS: DESCRIPTORS: ACHING

## 2017-05-19 ASSESSMENT — PAIN DESCRIPTION - ORIENTATION
ORIENTATION: RIGHT
ORIENTATION: RIGHT

## 2017-05-20 LAB
BLOOD BANK DISPENSE STATUS: NORMAL
BLOOD BANK DISPENSE STATUS: NORMAL
BLOOD BANK PRODUCT CODE: NORMAL
BLOOD BANK PRODUCT CODE: NORMAL
BPU ID: NORMAL
BPU ID: NORMAL
DESCRIPTION BLOOD BANK: NORMAL
DESCRIPTION BLOOD BANK: NORMAL

## 2017-05-22 ENCOUNTER — TELEPHONE (OUTPATIENT)
Dept: INTERNAL MEDICINE | Age: 47
End: 2017-05-22

## 2017-05-22 LAB
ANION GAP SERPL CALCULATED.3IONS-SCNC: 18 MEQ/L (ref 7–13)
BUN BLDV-MCNC: 19 MG/DL (ref 6–20)
CALCIUM SERPL-MCNC: 9 MG/DL (ref 8.6–10.2)
CHLORIDE BLD-SCNC: 95 MEQ/L (ref 98–107)
CO2: 25 MEQ/L (ref 22–29)
CREAT SERPL-MCNC: 1.34 MG/DL (ref 0.7–1.2)
EKG ATRIAL RATE: 59 BPM
EKG P AXIS: 15 DEGREES
EKG P-R INTERVAL: 148 MS
EKG Q-T INTERVAL: 374 MS
EKG QRS DURATION: 98 MS
EKG QTC CALCULATION (BAZETT): 370 MS
EKG R AXIS: 13 DEGREES
EKG T AXIS: 21 DEGREES
EKG VENTRICULAR RATE: 59 BPM
GFR AFRICAN AMERICAN: >60
GFR NON-AFRICAN AMERICAN: 57
GLUCOSE BLD-MCNC: 97 MG/DL (ref 74–109)
POTASSIUM SERPL-SCNC: 4.5 MEQ/L (ref 3.5–5.1)
SODIUM BLD-SCNC: 138 MEQ/L (ref 132–144)

## 2017-05-30 ENCOUNTER — TELEPHONE (OUTPATIENT)
Dept: PRIMARY CARE CLINIC | Age: 47
End: 2017-05-30

## 2017-07-07 DIAGNOSIS — K21.9 GASTROESOPHAGEAL REFLUX DISEASE, ESOPHAGITIS PRESENCE NOT SPECIFIED: ICD-10-CM

## 2017-07-07 DIAGNOSIS — J30.9 ALLERGIC RHINITIS: ICD-10-CM

## 2017-07-07 RX ORDER — FLUTICASONE PROPIONATE 50 MCG
SPRAY, SUSPENSION (ML) NASAL
Qty: 48 G | Refills: 2 | Status: SHIPPED | OUTPATIENT
Start: 2017-07-07 | End: 2017-11-10 | Stop reason: SDUPTHER

## 2017-07-07 RX ORDER — OMEPRAZOLE 40 MG/1
CAPSULE, DELAYED RELEASE ORAL
Qty: 90 CAPSULE | Refills: 1 | Status: SHIPPED | OUTPATIENT
Start: 2017-07-07 | End: 2017-11-10 | Stop reason: SDUPTHER

## 2017-09-15 ENCOUNTER — OFFICE VISIT (OUTPATIENT)
Dept: PRIMARY CARE CLINIC | Age: 47
End: 2017-09-15

## 2017-09-15 VITALS
DIASTOLIC BLOOD PRESSURE: 70 MMHG | HEIGHT: 69 IN | WEIGHT: 212.2 LBS | SYSTOLIC BLOOD PRESSURE: 124 MMHG | RESPIRATION RATE: 16 BRPM | BODY MASS INDEX: 31.43 KG/M2 | TEMPERATURE: 98.4 F | HEART RATE: 93 BPM

## 2017-09-15 DIAGNOSIS — K51.90 ULCERATIVE COLITIS WITHOUT COMPLICATIONS, UNSPECIFIED LOCATION (HCC): ICD-10-CM

## 2017-09-15 DIAGNOSIS — Z23 NEED FOR INFLUENZA VACCINATION: ICD-10-CM

## 2017-09-15 DIAGNOSIS — I10 ESSENTIAL HYPERTENSION: Primary | ICD-10-CM

## 2017-09-15 PROCEDURE — G8417 CALC BMI ABV UP PARAM F/U: HCPCS | Performed by: INTERNAL MEDICINE

## 2017-09-15 PROCEDURE — 4004F PT TOBACCO SCREEN RCVD TLK: CPT | Performed by: INTERNAL MEDICINE

## 2017-09-15 PROCEDURE — G8427 DOCREV CUR MEDS BY ELIG CLIN: HCPCS | Performed by: INTERNAL MEDICINE

## 2017-09-15 PROCEDURE — 99213 OFFICE O/P EST LOW 20 MIN: CPT | Performed by: INTERNAL MEDICINE

## 2017-09-15 PROCEDURE — 90688 IIV4 VACCINE SPLT 0.5 ML IM: CPT | Performed by: INTERNAL MEDICINE

## 2017-09-15 PROCEDURE — 90471 IMMUNIZATION ADMIN: CPT | Performed by: INTERNAL MEDICINE

## 2017-09-15 RX ORDER — TELMISARTAN AND HYDROCHLORTHIAZIDE 80; 12.5 MG/1; MG/1
1 TABLET ORAL DAILY
Qty: 90 TABLET | Refills: 3 | Status: SHIPPED | OUTPATIENT
Start: 2017-09-15 | End: 2017-11-10 | Stop reason: SDUPTHER

## 2017-09-18 ASSESSMENT — ENCOUNTER SYMPTOMS
TROUBLE SWALLOWING: 0
NAUSEA: 0
VOMITING: 0
VOICE CHANGE: 0
SHORTNESS OF BREATH: 0
CRAMPS: 1
PHOTOPHOBIA: 0
CHOKING: 0

## 2017-12-13 ENCOUNTER — HOSPITAL ENCOUNTER (OUTPATIENT)
Dept: GENERAL RADIOLOGY | Age: 47
Discharge: HOME | End: 2017-12-13
Payer: COMMERCIAL

## 2017-12-13 DIAGNOSIS — Z85.528 HISTORY OF KIDNEY CANCER: ICD-10-CM

## 2017-12-13 LAB — PROSTATE SPECIFIC ANTIGEN: 1.05 NG/ML

## 2017-12-13 PROCEDURE — 71020 XR CHEST STANDARD TWO VW: CPT

## 2017-12-19 ENCOUNTER — OFFICE VISIT (OUTPATIENT)
Dept: UROLOGY | Age: 47
End: 2017-12-19

## 2017-12-19 VITALS
HEIGHT: 69 IN | DIASTOLIC BLOOD PRESSURE: 76 MMHG | HEART RATE: 100 BPM | WEIGHT: 210 LBS | BODY MASS INDEX: 31.1 KG/M2 | SYSTOLIC BLOOD PRESSURE: 116 MMHG

## 2017-12-19 DIAGNOSIS — Z85.528 HISTORY OF KIDNEY CANCER: Primary | ICD-10-CM

## 2017-12-19 PROCEDURE — 4004F PT TOBACCO SCREEN RCVD TLK: CPT | Performed by: UROLOGY

## 2017-12-19 PROCEDURE — 99212 OFFICE O/P EST SF 10 MIN: CPT | Performed by: UROLOGY

## 2017-12-19 PROCEDURE — G8484 FLU IMMUNIZE NO ADMIN: HCPCS | Performed by: UROLOGY

## 2017-12-19 PROCEDURE — G8427 DOCREV CUR MEDS BY ELIG CLIN: HCPCS | Performed by: UROLOGY

## 2017-12-19 PROCEDURE — G8417 CALC BMI ABV UP PARAM F/U: HCPCS | Performed by: UROLOGY

## 2017-12-19 ASSESSMENT — ENCOUNTER SYMPTOMS
ABDOMINAL DISTENTION: 0
ABDOMINAL PAIN: 0
SHORTNESS OF BREATH: 0
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
## 2018-01-04 DIAGNOSIS — J30.1 ALLERGIC RHINITIS DUE TO POLLEN, UNSPECIFIED CHRONICITY, UNSPECIFIED SEASONALITY: ICD-10-CM

## 2018-01-04 DIAGNOSIS — I10 ESSENTIAL HYPERTENSION: ICD-10-CM

## 2018-01-04 DIAGNOSIS — G43.909 MIGRAINE WITHOUT STATUS MIGRAINOSUS, NOT INTRACTABLE, UNSPECIFIED MIGRAINE TYPE: ICD-10-CM

## 2018-01-04 DIAGNOSIS — K21.9 GASTROESOPHAGEAL REFLUX DISEASE, ESOPHAGITIS PRESENCE NOT SPECIFIED: ICD-10-CM

## 2018-01-05 RX ORDER — TELMISARTAN AND HYDROCHLORTHIAZIDE 80; 12.5 MG/1; MG/1
1 TABLET ORAL DAILY
Qty: 90 TABLET | Refills: 1 | Status: SHIPPED | OUTPATIENT
Start: 2018-01-05

## 2018-01-05 RX ORDER — FLUTICASONE PROPIONATE 50 MCG
SPRAY, SUSPENSION (ML) NASAL
Qty: 48 G | Refills: 3 | Status: SHIPPED | OUTPATIENT
Start: 2018-01-05

## 2018-01-05 RX ORDER — RIZATRIPTAN BENZOATE 10 MG/1
10 TABLET ORAL
Qty: 30 TABLET | Refills: 3 | Status: SHIPPED | OUTPATIENT
Start: 2018-01-05 | End: 2018-02-19 | Stop reason: SDUPTHER

## 2018-01-05 RX ORDER — OMEPRAZOLE 40 MG/1
40 CAPSULE, DELAYED RELEASE ORAL DAILY
Qty: 90 CAPSULE | Refills: 1 | Status: SHIPPED | OUTPATIENT
Start: 2018-01-05

## 2018-02-05 ENCOUNTER — OFFICE VISIT (OUTPATIENT)
Dept: PRIMARY CARE CLINIC | Age: 48
End: 2018-02-05
Payer: COMMERCIAL

## 2018-02-05 VITALS
HEART RATE: 86 BPM | SYSTOLIC BLOOD PRESSURE: 136 MMHG | WEIGHT: 200 LBS | TEMPERATURE: 97.7 F | HEIGHT: 69 IN | OXYGEN SATURATION: 98 % | DIASTOLIC BLOOD PRESSURE: 84 MMHG | RESPIRATION RATE: 16 BRPM | BODY MASS INDEX: 29.62 KG/M2

## 2018-02-05 DIAGNOSIS — R05.9 COUGH: ICD-10-CM

## 2018-02-05 DIAGNOSIS — J01.40 ACUTE NON-RECURRENT PANSINUSITIS: Primary | ICD-10-CM

## 2018-02-05 PROCEDURE — 99213 OFFICE O/P EST LOW 20 MIN: CPT | Performed by: PHYSICIAN ASSISTANT

## 2018-02-05 PROCEDURE — G8484 FLU IMMUNIZE NO ADMIN: HCPCS | Performed by: PHYSICIAN ASSISTANT

## 2018-02-05 PROCEDURE — G8417 CALC BMI ABV UP PARAM F/U: HCPCS | Performed by: PHYSICIAN ASSISTANT

## 2018-02-05 PROCEDURE — 4004F PT TOBACCO SCREEN RCVD TLK: CPT | Performed by: PHYSICIAN ASSISTANT

## 2018-02-05 PROCEDURE — G8427 DOCREV CUR MEDS BY ELIG CLIN: HCPCS | Performed by: PHYSICIAN ASSISTANT

## 2018-02-05 RX ORDER — CEFDINIR 300 MG/1
300 CAPSULE ORAL 2 TIMES DAILY
Qty: 20 CAPSULE | Refills: 0 | Status: ON HOLD | OUTPATIENT
Start: 2018-02-05 | End: 2018-02-11 | Stop reason: HOSPADM

## 2018-02-05 RX ORDER — BENZONATATE 200 MG/1
200 CAPSULE ORAL 3 TIMES DAILY PRN
Qty: 30 CAPSULE | Refills: 0 | Status: SHIPPED | OUTPATIENT
Start: 2018-02-05

## 2018-02-05 ASSESSMENT — ENCOUNTER SYMPTOMS
SWOLLEN GLANDS: 0
HOARSE VOICE: 0
SORE THROAT: 0
SHORTNESS OF BREATH: 0
COUGH: 1
SINUS PRESSURE: 1

## 2018-02-05 NOTE — PROGRESS NOTES
Dispense:  30 capsule     Refill:  0       Reviewed with the patient: current clinical status, medications, activities and diet. Side effects, adverse effects of the medication prescribed today, as well as treatment plan and result expectations have been discussed with the patient who expresses understanding and desires to proceed. Close follow up to evaluate treatment results and for coordination of care. I have reviewed the patient's medical history in detail and updated the computerized patient record. Return if symptoms worsen or fail to improve, for follow up with PCP.     Gwendloyn Mcburney, PA

## 2018-02-10 ENCOUNTER — APPOINTMENT (OUTPATIENT)
Dept: ULTRASOUND IMAGING | Age: 48
DRG: 069 | End: 2018-02-10
Payer: COMMERCIAL

## 2018-02-10 ENCOUNTER — APPOINTMENT (OUTPATIENT)
Dept: MRI IMAGING | Age: 48
DRG: 069 | End: 2018-02-10
Payer: COMMERCIAL

## 2018-02-10 ENCOUNTER — APPOINTMENT (OUTPATIENT)
Dept: CT IMAGING | Age: 48
DRG: 069 | End: 2018-02-10
Payer: COMMERCIAL

## 2018-02-10 ENCOUNTER — HOSPITAL ENCOUNTER (INPATIENT)
Age: 48
LOS: 2 days | Discharge: HOME | DRG: 069 | End: 2018-02-13
Attending: EMERGENCY MEDICINE | Admitting: INTERNAL MEDICINE
Payer: COMMERCIAL

## 2018-02-10 ENCOUNTER — APPOINTMENT (OUTPATIENT)
Dept: GENERAL RADIOLOGY | Age: 48
DRG: 069 | End: 2018-02-10
Payer: COMMERCIAL

## 2018-02-10 DIAGNOSIS — G45.9 TRANSIENT CEREBRAL ISCHEMIA, UNSPECIFIED TYPE: Primary | ICD-10-CM

## 2018-02-10 LAB
ALBUMIN SERPL-MCNC: 4.3 G/DL (ref 3.9–4.9)
ALP BLD-CCNC: 72 U/L (ref 35–104)
ALT SERPL-CCNC: 39 U/L (ref 0–41)
ANION GAP SERPL CALCULATED.3IONS-SCNC: 15 MEQ/L (ref 7–13)
APTT: 29.1 SEC (ref 21.6–35.4)
AST SERPL-CCNC: 31 U/L (ref 0–40)
BASOPHILS ABSOLUTE: 0.1 K/UL (ref 0–0.2)
BASOPHILS RELATIVE PERCENT: 0.9 %
BILIRUB SERPL-MCNC: 0.5 MG/DL (ref 0–1.2)
BILIRUBIN URINE: NEGATIVE
BLOOD, URINE: NEGATIVE
BUN BLDV-MCNC: 18 MG/DL (ref 6–20)
C-REACTIVE PROTEIN, HIGH SENSITIVITY: 12.8 MG/L (ref 0–5)
CALCIUM SERPL-MCNC: 11.3 MG/DL (ref 8.6–10.2)
CHLORIDE BLD-SCNC: 98 MEQ/L (ref 98–107)
CHOLESTEROL, TOTAL: 179 MG/DL (ref 0–199)
CHP ED QC CHECK: NORMAL
CLARITY: CLEAR
CO2: 27 MEQ/L (ref 22–29)
COLOR: YELLOW
CREAT SERPL-MCNC: 1.34 MG/DL (ref 0.7–1.2)
EKG ATRIAL RATE: 102 BPM
EKG P AXIS: 35 DEGREES
EKG P-R INTERVAL: 148 MS
EKG Q-T INTERVAL: 304 MS
EKG QRS DURATION: 90 MS
EKG QTC CALCULATION (BAZETT): 396 MS
EKG R AXIS: 2 DEGREES
EKG T AXIS: 23 DEGREES
EKG VENTRICULAR RATE: 102 BPM
EOSINOPHILS ABSOLUTE: 0.5 K/UL (ref 0–0.7)
EOSINOPHILS RELATIVE PERCENT: 6.1 %
GFR AFRICAN AMERICAN: >60
GFR NON-AFRICAN AMERICAN: 56.9
GLOBULIN: 3.7 G/DL (ref 2.3–3.5)
GLUCOSE BLD-MCNC: 114 MG/DL (ref 74–109)
GLUCOSE BLD-MCNC: 94 MG/DL
GLUCOSE URINE: NEGATIVE MG/DL
HBA1C MFR BLD: 5.7 % (ref 4.8–5.9)
HCT VFR BLD CALC: 55.8 % (ref 42–52)
HDLC SERPL-MCNC: 33 MG/DL (ref 40–59)
HEMOGLOBIN: 19 G/DL (ref 14–18)
INR BLD: 1.1
KETONES, URINE: NEGATIVE MG/DL
LDL CHOLESTEROL CALCULATED: 122 MG/DL (ref 0–129)
LEUKOCYTE ESTERASE, URINE: NEGATIVE
LV EF: 60 %
LVEF MODALITY: NORMAL
LYMPHOCYTES ABSOLUTE: 1.8 K/UL (ref 1–4.8)
LYMPHOCYTES RELATIVE PERCENT: 22.8 %
MAGNESIUM: 2 MG/DL (ref 1.7–2.3)
MCH RBC QN AUTO: 29.8 PG (ref 27–31.3)
MCHC RBC AUTO-ENTMCNC: 34 % (ref 33–37)
MCV RBC AUTO: 87.7 FL (ref 80–100)
MONOCYTES ABSOLUTE: 1 K/UL (ref 0.2–0.8)
MONOCYTES RELATIVE PERCENT: 12.8 %
NEUTROPHILS ABSOLUTE: 4.5 K/UL (ref 1.4–6.5)
NEUTROPHILS RELATIVE PERCENT: 57.4 %
NITRITE, URINE: NEGATIVE
PDW BLD-RTO: 13.7 % (ref 11.5–14.5)
PH UA: 6.5 (ref 5–9)
PLATELET # BLD: 246 K/UL (ref 130–400)
POTASSIUM SERPL-SCNC: 4.2 MEQ/L (ref 3.5–5.1)
PRO-BNP: 19 PG/ML
PROTEIN UA: NEGATIVE MG/DL
PROTHROMBIN TIME: 11.2 SEC (ref 8.1–13.7)
RBC # BLD: 6.37 M/UL (ref 4.7–6.1)
SODIUM BLD-SCNC: 140 MEQ/L (ref 132–144)
SPECIFIC GRAVITY UA: 1.01 (ref 1–1.03)
TOTAL CK: 169 U/L (ref 0–190)
TOTAL PROTEIN: 8 G/DL (ref 6.4–8.1)
TRIGL SERPL-MCNC: 122 MG/DL (ref 0–200)
TROPONIN: <0.01 NG/ML (ref 0–0.01)
TSH SERPL DL<=0.05 MIU/L-ACNC: 0.89 UIU/ML (ref 0.27–4.2)
URINE REFLEX TO CULTURE: NORMAL
UROBILINOGEN, URINE: 0.2 E.U./DL
WBC # BLD: 7.8 K/UL (ref 4.8–10.8)

## 2018-02-10 PROCEDURE — 83735 ASSAY OF MAGNESIUM: CPT

## 2018-02-10 PROCEDURE — 84443 ASSAY THYROID STIM HORMONE: CPT

## 2018-02-10 PROCEDURE — 96372 THER/PROPH/DIAG INJ SC/IM: CPT

## 2018-02-10 PROCEDURE — 6360000002 HC RX W HCPCS: Performed by: INTERNAL MEDICINE

## 2018-02-10 PROCEDURE — 6370000000 HC RX 637 (ALT 250 FOR IP): Performed by: INTERNAL MEDICINE

## 2018-02-10 PROCEDURE — 80053 COMPREHEN METABOLIC PANEL: CPT

## 2018-02-10 PROCEDURE — 2580000003 HC RX 258: Performed by: INTERNAL MEDICINE

## 2018-02-10 PROCEDURE — 93005 ELECTROCARDIOGRAM TRACING: CPT

## 2018-02-10 PROCEDURE — 83880 ASSAY OF NATRIURETIC PEPTIDE: CPT

## 2018-02-10 PROCEDURE — 71045 X-RAY EXAM CHEST 1 VIEW: CPT

## 2018-02-10 PROCEDURE — 85025 COMPLETE CBC W/AUTO DIFF WBC: CPT

## 2018-02-10 PROCEDURE — G0378 HOSPITAL OBSERVATION PER HR: HCPCS

## 2018-02-10 PROCEDURE — 99285 EMERGENCY DEPT VISIT HI MDM: CPT

## 2018-02-10 PROCEDURE — 83036 HEMOGLOBIN GLYCOSYLATED A1C: CPT

## 2018-02-10 PROCEDURE — 36415 COLL VENOUS BLD VENIPUNCTURE: CPT

## 2018-02-10 PROCEDURE — 81003 URINALYSIS AUTO W/O SCOPE: CPT

## 2018-02-10 PROCEDURE — 70544 MR ANGIOGRAPHY HEAD W/O DYE: CPT

## 2018-02-10 PROCEDURE — 96375 TX/PRO/DX INJ NEW DRUG ADDON: CPT

## 2018-02-10 PROCEDURE — 70450 CT HEAD/BRAIN W/O DYE: CPT

## 2018-02-10 PROCEDURE — 2500000003 HC RX 250 WO HCPCS

## 2018-02-10 PROCEDURE — 85730 THROMBOPLASTIN TIME PARTIAL: CPT

## 2018-02-10 PROCEDURE — 86141 C-REACTIVE PROTEIN HS: CPT

## 2018-02-10 PROCEDURE — 70553 MRI BRAIN STEM W/O & W/DYE: CPT

## 2018-02-10 PROCEDURE — A9579 GAD-BASE MR CONTRAST NOS,1ML: HCPCS | Performed by: INTERNAL MEDICINE

## 2018-02-10 PROCEDURE — 93306 TTE W/DOPPLER COMPLETE: CPT

## 2018-02-10 PROCEDURE — 80061 LIPID PANEL: CPT

## 2018-02-10 PROCEDURE — 85610 PROTHROMBIN TIME: CPT

## 2018-02-10 PROCEDURE — 93880 EXTRACRANIAL BILAT STUDY: CPT

## 2018-02-10 PROCEDURE — 6370000000 HC RX 637 (ALT 250 FOR IP): Performed by: EMERGENCY MEDICINE

## 2018-02-10 PROCEDURE — 96365 THER/PROPH/DIAG IV INF INIT: CPT

## 2018-02-10 PROCEDURE — 6360000004 HC RX CONTRAST MEDICATION: Performed by: INTERNAL MEDICINE

## 2018-02-10 PROCEDURE — 84484 ASSAY OF TROPONIN QUANT: CPT

## 2018-02-10 PROCEDURE — 96366 THER/PROPH/DIAG IV INF ADDON: CPT

## 2018-02-10 PROCEDURE — 82550 ASSAY OF CK (CPK): CPT

## 2018-02-10 RX ORDER — SODIUM CHLORIDE 0.9 % (FLUSH) 0.9 %
10 SYRINGE (ML) INJECTION EVERY 12 HOURS SCHEDULED
Status: DISCONTINUED | OUTPATIENT
Start: 2018-02-10 | End: 2018-02-13 | Stop reason: HOSPADM

## 2018-02-10 RX ORDER — ASPIRIN 81 MG/1
324 TABLET, CHEWABLE ORAL ONCE
Status: COMPLETED | OUTPATIENT
Start: 2018-02-10 | End: 2018-02-10

## 2018-02-10 RX ORDER — FLUTICASONE PROPIONATE 50 MCG
1 SPRAY, SUSPENSION (ML) NASAL DAILY
Status: DISCONTINUED | OUTPATIENT
Start: 2018-02-10 | End: 2018-02-13 | Stop reason: HOSPADM

## 2018-02-10 RX ORDER — SODIUM CHLORIDE 0.9 % (FLUSH) 0.9 %
10 SYRINGE (ML) INJECTION PRN
Status: DISCONTINUED | OUTPATIENT
Start: 2018-02-10 | End: 2018-02-13 | Stop reason: HOSPADM

## 2018-02-10 RX ORDER — LEVOFLOXACIN 5 MG/ML
750 INJECTION, SOLUTION INTRAVENOUS EVERY 24 HOURS
Status: DISCONTINUED | OUTPATIENT
Start: 2018-02-10 | End: 2018-02-13 | Stop reason: HOSPADM

## 2018-02-10 RX ORDER — LABETALOL HYDROCHLORIDE 5 MG/ML
10 INJECTION, SOLUTION INTRAVENOUS EVERY 10 MIN PRN
Status: DISCONTINUED | OUTPATIENT
Start: 2018-02-10 | End: 2018-02-13 | Stop reason: HOSPADM

## 2018-02-10 RX ORDER — ASPIRIN 325 MG
325 TABLET ORAL DAILY
Status: DISCONTINUED | OUTPATIENT
Start: 2018-02-11 | End: 2018-02-12

## 2018-02-10 RX ORDER — CLOPIDOGREL BISULFATE 75 MG/1
75 TABLET ORAL DAILY
Status: DISCONTINUED | OUTPATIENT
Start: 2018-02-11 | End: 2018-02-13 | Stop reason: HOSPADM

## 2018-02-10 RX ORDER — ATORVASTATIN CALCIUM 40 MG/1
40 TABLET, FILM COATED ORAL NIGHTLY
Status: DISCONTINUED | OUTPATIENT
Start: 2018-02-10 | End: 2018-02-13 | Stop reason: HOSPADM

## 2018-02-10 RX ORDER — ACETAMINOPHEN 325 MG/1
650 TABLET ORAL EVERY 4 HOURS PRN
Status: DISCONTINUED | OUTPATIENT
Start: 2018-02-10 | End: 2018-02-13 | Stop reason: HOSPADM

## 2018-02-10 RX ORDER — WATER FOR INJ.,BACTERIOSTATIC
VIAL (ML) INJECTION
Status: COMPLETED
Start: 2018-02-10 | End: 2018-02-10

## 2018-02-10 RX ORDER — PANTOPRAZOLE SODIUM 40 MG/1
40 TABLET, DELAYED RELEASE ORAL
Status: DISCONTINUED | OUTPATIENT
Start: 2018-02-11 | End: 2018-02-13 | Stop reason: HOSPADM

## 2018-02-10 RX ORDER — ONDANSETRON 2 MG/ML
4 INJECTION INTRAMUSCULAR; INTRAVENOUS EVERY 6 HOURS PRN
Status: DISCONTINUED | OUTPATIENT
Start: 2018-02-10 | End: 2018-02-13 | Stop reason: HOSPADM

## 2018-02-10 RX ADMIN — Medication 10 ML: at 21:07

## 2018-02-10 RX ADMIN — GADOTERIDOL 20 ML: 279.3 INJECTION, SOLUTION INTRAVENOUS at 14:45

## 2018-02-10 RX ADMIN — ASPIRIN 81 MG 324 MG: 81 TABLET ORAL at 06:35

## 2018-02-10 RX ADMIN — BACTERIOSTATIC WATER: 1 INJECTION, SOLUTION INTRAMUSCULAR; INTRAVENOUS; SUBCUTANEOUS at 10:33

## 2018-02-10 RX ADMIN — ENOXAPARIN SODIUM 40 MG: 40 INJECTION SUBCUTANEOUS at 10:31

## 2018-02-10 RX ADMIN — ONDANSETRON HYDROCHLORIDE 4 MG: 2 INJECTION, SOLUTION INTRAMUSCULAR; INTRAVENOUS at 16:24

## 2018-02-10 RX ADMIN — ACETAMINOPHEN 650 MG: 325 TABLET ORAL at 08:03

## 2018-02-10 RX ADMIN — ATORVASTATIN CALCIUM 40 MG: 40 TABLET, FILM COATED ORAL at 21:07

## 2018-02-10 RX ADMIN — Medication 10 ML: at 10:32

## 2018-02-10 RX ADMIN — ACETAMINOPHEN 650 MG: 325 TABLET ORAL at 21:06

## 2018-02-10 RX ADMIN — ACETAMINOPHEN 650 MG: 325 TABLET ORAL at 16:24

## 2018-02-10 RX ADMIN — FLUTICASONE PROPIONATE 1 SPRAY: 50 SPRAY, METERED NASAL at 10:30

## 2018-02-10 RX ADMIN — LEVOFLOXACIN 750 MG: 5 INJECTION, SOLUTION INTRAVENOUS at 10:56

## 2018-02-10 ASSESSMENT — PAIN DESCRIPTION - LOCATION: LOCATION: HEAD

## 2018-02-10 ASSESSMENT — PAIN SCALES - GENERAL
PAINLEVEL_OUTOF10: 5
PAINLEVEL_OUTOF10: 4
PAINLEVEL_OUTOF10: 5
PAINLEVEL_OUTOF10: 2

## 2018-02-10 NOTE — PROGRESS NOTES
Speech Language Pathology    NAME:  Nilda Berman  ROOM: F274/Q282-72  :  1970  DATE: 2/10/2018      Speech Therapy attempted to see Nilda Berman on this date for a/an:    [] Bedside Swallow Evaluation   [x] Speech/Language Evaluation   [] Modified Barium Swallow   [] Treatment    Pt was unable to be seen due to patient:   [x] Currently off unit at MRI in p.m. In a.m. Order did not have diagnosis and patient was is an observation patient.    [] Refused   [] Too lethargic to participate    [] NPO for testing   [] On Bipap   [] Nursing Deferred:   [] Other:        Electronically signed by HUNTER Gaitan on 2/10/18 at 2:24 PM

## 2018-02-10 NOTE — ED NOTES
Per patient and patient's wife pt was diagnosis with a sinus infection this past week. Pt states he has been taking a lot of OTC sinus and cold medications. Patient's wife states he has been \"off for a couple weeks\".       Leandro Saba RN  02/10/18 3044

## 2018-02-10 NOTE — ED PROVIDER NOTES
TELMISARTAN-HYDROCHLOROTHIAZIDE (MICARDIS HCT) 80-12.5 MG PER TABLET    Take 1 tablet by mouth daily       ALLERGIES     Latex; Adhesive tape; Codeine; Diovan [valsartan]; Imitrex [sumatriptan]; Lisinopril; Norvasc [amlodipine besylate]; and Sulfa antibiotics    FAMILY HISTORY       Family History   Problem Relation Age of Onset    High Blood Pressure Mother     High Blood Pressure Father     High Blood Pressure Sister     No Known Problems Brother           SOCIAL HISTORY       Social History     Social History    Marital status:      Spouse name: N/A    Number of children: N/A    Years of education: N/A     Social History Main Topics    Smoking status: Current Every Day Smoker     Years: 20.00     Types: E-Cigarettes    Smokeless tobacco: Never Used    Alcohol use Yes      Comment: social    Drug use: No    Sexual activity: Not Asked     Other Topics Concern    None     Social History Narrative    Lives w wife/family       SCREENINGS             PHYSICAL EXAM    (up to 7 for level 4, 8 or more for level 5)     ED Triage Vitals   BP Temp Temp src Pulse Resp SpO2 Height Weight   -- -- -- -- -- -- -- --       Physical Exam     CONST: -Well-developed well-nourished ;                -In no acute distress. -Vitals reviewed. EYES: -EOM intact, CORETTA:              -Sclera normal and conjunctiva: clear bilaterally. ENT: - Normal pharynx pink and moist.    NECK: -Supple (chin-to-chest).     CARD: -Rate and rhythm: Regular              -Murmurs: No  RESP: -Respiratory effort and chest excursion with respirations: Normal             -Breath sounds equal bilaterally: Clear             -Wheezes: No             -Rales: No    BACK: -Flank pain: No              -Pain on palpation: No    ABD: -Distended: No           -Bruits: No           -Bowel sounds: Normal.           -Deep palpation: Non-tender           -Organomegaly palpable: No           -Abnormal masses: No    EXT: Gross appearance and use of all four extremities: Normal     SKIN: -Good turgor warm and dry. -Apparent lesions or rashes: No    NEURO: Patient is alert and oriented x 3, fluent appropriate speech, calculation, concentration, memory intact. Cranial nerve two through 12 are intact, there is no motor or sensory deficit, reflexes symmetrical.      DIAGNOSTIC RESULTS     EKG: All EKG's are interpreted by the Emergency Department Physician who either signs or Co-signs this chart in the absence of a cardiologist.    EKG was revewed  and revealed normal sinus rhythm, rate is 70-85. no acute ST elevations,no flipped T waves , no Q waves. CA interval is normal.QRS duration is normal. Axes are normal. There are no PVCs    RADIOLOGY:   Non-plain film images such as CT, Ultrasound and MRI are read by the radiologist. Plain radiographic images are visualized and preliminarily interpreted by the emergency physician with the below findings:    Chest  x-ray fails to demonstrate acute effusion or infiltrate. There is no pneumothorax. Interpretation per the Radiologist below, if available at the time of this note:    CT Head WO Contrast    (Results Pending)   XR CHEST PORTABLE    (Results Pending)       Stroke scale is normal.  I find no evidence of any focal neurologic problem. I will contact hospitalist regarding admission for this gentleman. He is currently not on aspirin or Plavix  ED BEDSIDE ULTRASOUND:   Performed by ED Physician - none    LABS:  Labs Reviewed   POCT GLUCOSE - Normal   CBC WITH AUTO DIFFERENTIAL   APTT   TROPONIN   BRAIN NATRIURETIC PEPTIDE   URINE RT REFLEX TO CULTURE   PROTIME-INR   TSH WITHOUT REFLEX   POC INR       All other labs were within normal range or not returned as of this dictation. EMERGENCY DEPARTMENT COURSE and DIFFERENTIAL DIAGNOSIS/MDM:   Vitals: There were no vitals filed for this visit.         MDM    CRITICAL CARE TIME   Total Critical Care time was  minutes, excluding separately reportable procedures. There was a high probability of clinically significant/life threatening deterioration in the patient's condition which required my urgent intervention. CONSULTS:  None    PROCEDURES:  Unless otherwise noted below, none     Procedures    FINAL IMPRESSION      1. Transient cerebral ischemia, unspecified type          DISPOSITION/PLAN   DISPOSITION        PATIENT REFERRED TO:  No follow-up provider specified.     DISCHARGE MEDICATIONS:  New Prescriptions    No medications on file          (Please note that portions of this note were completed with a voice recognition program.  Efforts were made to edit the dictations but occasionally words are mis-transcribed.)    Yadira Vasques MD (electronically signed)  Attending Emergency Physician         Yadira Vasques MD  02/10/18 9668       Yadira Vasques MD  02/10/18 8781

## 2018-02-10 NOTE — PROGRESS NOTES
Physical Therapy  Facility/Department: [facility] MED SURG N225/N225-01      PT evaluation attempted 2/10/18, at 7:48 AM, however this patient status is currently observation. Per facility protocol, PT evaluation and treatment orders for patients in observation status must include a PT specific diagnosis (i.e. \"difficulty ambulating\", \"lack of coordination\", etc.) These order specifications may be added to the \"comments\" section of the PT evaluation and treatment order. Requested updated Physical Therapy orders from referring provider via \"sticky note\". Coordination team notified.      We thank you for your referral!    [facility] Physical Therapy Department    Electronically signed by Marleny Reynaga PT on 2/10/18 at 7:48 AM

## 2018-02-10 NOTE — LETTER
[facility]  [address] [address]  Phone: [phone]      February 13, 2018     Patient: Nilda Berman   YOB: 1970   Date of Visit: 2/10/2018       To Whom It May Concern: It is my medical opinion that Nilda Berman may return to work on 2/19/18. If you have any questions or concerns, please don't hesitate to call.     Sincerely,    Rebeca Aguirre MD

## 2018-02-10 NOTE — PROGRESS NOTES
[facility] Occupational Therapy      Date: 2/10/2018  Patient Name: Blanka Fong        MRN: [id number]  Account: [de-identified]   : 1970  (50 y.o.)  Room: [place] [id number]    Pt. is of observation status. To comply with medicare and insurance regulations, to see patient we require updated orders including a valid OT treatment diagnosis. Please reorder as appropriate.      Electronically signed by CHANDAN Szymanski on 2/10/2018 at 8:01 AM

## 2018-02-10 NOTE — H&P
Hospital Medicine  History and Physical    Patient:  Vivian Pineda  MRN: [id number]    CHIEF COMPLAINT:    Chief Complaint   Patient presents with    Transient Ischemic Attack     History Obtained From:  Patient, EMR  Primary Care Physician: Mandi Alcocer MD    HISTORY OF PRESENT ILLNESS:   The patient is a 50 y.o. male with PMHx Inflammatory bowel syndrome, RCC, HTN, GERD of  who presents with headaches. For the last 2 weeks the patients had headaches that wakes him up in the middle of the night; cannot get the headaches to stop. He tries flonase and can feel his nasal congestion he states. He took just his flonase the first day; the next night he also took advil cold and sinus. The third night he took the two with excedrine migraine as well; that combination would help his headaches to the point that he could function; that lasted about 2 weeks. He then developed bilateral ear pain with dizziness so his wife advised him to see urgent care. At urgent care on Monday where they diagnosed him with sinusitis with a possible URI and he was started on Cefdinir. Last Thursday he developed intermittent and progressive numbness; it started in his right hand, then switched to his foot; then back to his upper arm extending to his elbow; then back to his leg up to his knee; then back to his arm up to his shoulder; this continued until it also included his face. The entire episode lasted about 30 minutes. He then around noon he developed chills, rigors as well as subjective fevers but did not check his temperature; it was associated with a headache; associated with nausea and vomiting and photophobia. The rest of the night he felt cold with a headache and nausea.   His headache involves his temple as well as behind/in his eyes; affects both eyes equally; he had headaches similar to these when he was 16 when he was diagnosed with cluster migraines; he was on PRN migraine medication - he has a normal migraine prodrome mouth daily   Yes Historical Provider, MD   cefdinir (OMNICEF) 300 MG capsule Take 1 capsule by mouth 2 times daily for 10 days 2/5/18 2/15/18 Yes MAYA Burden   benzonatate (TESSALON) 200 MG capsule Take 1 capsule by mouth 3 times daily as needed for Cough 2/5/18  Yes MAYA Burden   telmisartan-hydrochlorothiazide (MICARDIS HCT) 80-12.5 MG per tablet Take 1 tablet by mouth daily 1/5/18  Yes Phuong Briones MD   omeprazole (PRILOSEC) 40 MG delayed release capsule Take 1 capsule by mouth daily 1/5/18  Yes Phuong Briones MD   rizatriptan (MAXALT) 10 MG tablet Take 1 tablet by mouth once as needed for Migraine May repeat in 2 hours if needed 1/5/18 2/10/18 Yes Phuong Briones MD   fluticasone Samule Fortune) 50 MCG/ACT nasal spray USE 1 SPRAY NASALLY DAILY 1/5/18  Yes Phuong Briones MD   Certolizumab Pegol (CIMZIA SC) Inject 400 mg into the skin every 30 days. Yes Historical Provider, MD   Multiple Vitamin (MULTI-VITAMIN PO) Take  by mouth. Yes Historical Provider, MD   Magnesium 250 MG TABS Take  by mouth. Yes Historical Provider, MD   CIMZIA PREFILLED 2 X 200 MG/ML KIT injection  1/22/18   Historical Provider, MD     Allergies:  Latex; Adhesive tape; Codeine; Diovan [valsartan]; Imitrex [sumatriptan]; Lisinopril; Norvasc [amlodipine besylate]; and Sulfa antibiotics    Social History:   TOBACCO:   reports that he has been smoking E-Cigarettes. He has smoked for the past 20.00 years. He has never used smokeless tobacco.  ETOH:   reports that he drinks alcohol.     Family History:       Problem Relation Age of Onset    High Blood Pressure Mother     High Blood Pressure Father     High Blood Pressure Sister     No Known Problems Brother      REVIEW OF SYSTEMS:  Ten systems reviewed and negative except for stated in HPI    Physical Exam:    Vitals: BP (!) 142/107   Pulse 108   Temp 98.1 °F (36.7 °C) (Oral)   Resp 18   Ht 5' 9\" (1.753 m)   Wt 200 lb 13.4 oz (91.1 kg)   SpO2 100%   BMI 29.66 kg/m² General appearance: alert, appears stated age and cooperative  Skin: Skin color, texture, turgor normal. No rashes or lesions  HEENT: Head: Normocephalic, no lesions, without obvious abnormality. Sinus tenderness +ve   Neck: no adenopathy, no carotid bruit, no JVD, supple, symmetrical, trachea midline and thyroid not enlarged, symmetric, no tenderness/mass/nodules  Lungs: clear to auscultation bilaterally  Heart: regular rate and rhythm, S1, S2 normal, no murmur, click, rub or gallop  Abdomen: soft, non-tender; bowel sounds normal; no masses,  no organomegaly  Extremities: extremities normal, atraumatic, no cyanosis or edema  Neurologic: Mental status: Alert, oriented, thought content appropriate    Recent Labs      02/10/18   0615   WBC  7.8   HGB  19.0*   PLT  246     Recent Labs      02/10/18   0610  02/10/18   0615   NA   --   140   K   --   4.2   CL   --   98   CO2   --   27   BUN   --   18   CREATININE   --   1.34*   GLUCOSE  94  114*   AST   --   31   ALT   --   39   BILITOT   --   0.5   ALKPHOS   --   72     Troponin T:   Recent Labs      02/10/18   0615   TROPONINI  <0.010       ABGs: No results found for: PHART, PO2ART, GJI1XQF  INR:   Recent Labs      02/10/18   0651   INR  1.1     URINALYSIS:  Recent Labs      02/10/18   0615   COLORU  Yellow   PHUR  6.5   CLARITYU  Clear   SPECGRAV  1.011   LEUKOCYTESUR  Negative   UROBILINOGEN  0.2   BILIRUBINUR  Negative   BLOODU  Negative   GLUCOSEU  Negative     -----------------------------------------------------------------   Ct Head Wo Contrast    Result Date: 2/10/2018  CT HEAD WO CONTRAST CLINICAL HISTORY: Left-sided tingling and numbness Comparison: None TECHNIQUE: Multiple unenhanced serial axial images of the brain from the vertex of the skull to the base of the skull were performed. FINDINGS: The ventricles are dilated. This is compensatory to the surrounding moderate generalized parenchymal volume loss. No mass. No midline shift.   The cisterns are patent. No acute intra-axial or extra-axial findings The visualized osseous structures are unremarkable. The visualized portion of the paranasal sinuses, and mastoids are unremarkable. IMPRESSION NO ACUTE INTRA-AXIAL OR EXTRA-AXIAL FINDINGS. IF SIGNS OR SYMPTOMS PERSIST THEN CONSIDER REPEAT CT SCAN IN 12 TO 24 HOURS OR MRI TO FURTHER EVALUATE IF THERE ARE NO CONTRAINDICATIONS All CT scans at this facility use dose modulation, iterative reconstruction, and/or weight based dosing when appropriate to reduce radiation dose to as low as reasonably achievable. Assessment and Plan   1. TIA - patient had a transient episode of word salad per his cousin; this was sudden in onset while on the phone. Following stroke protocol with permissive HTN, echocardiogram, carotid u/s, MR ordered and neurology consulted  2. Persistent headache secondary to bacterial sinusitis - Frontal sinus headache associated with ear pain. Would prefer to do Amoxicillin but patient states he cannot tolerate it so we will use Levofloxacin. Description is different from his cluster headache so de congestion should help as well  3. CKD secondary to RCC - monitor and renally dose meds  4. Ulcerative colitis - watch it    5. Functional Status: Fall precautions. Up with assistance. PT OT  6. Diet: Cardiac Diabetic   7. DVT ppx: Lovenox SCDs  8. Disposition: Dependent on hospital course. Will discharge once medically stable. SW on board for discharge planning. Patient Active Problem List   Diagnosis Code    HTN (hypertension) I10    Headache R51    Maxillary sinusitis J32.0    Angioedema T78. 3XXA    Otitis media H66.90    Erysipelas A46    Seasonal allergic rhinitis J30.2    ED (erectile dysfunction) N52.9    Renal cell carcinoma (HCC) C64.9    Ulcerative colitis (HCC) K51.90    Reflux esophagitis K21.0    History of fractured pelvis Z87.81    Lumbar herniated disc M51.26    FHx: migraine headaches Z82.0     L4-5 Herniated Disc

## 2018-02-10 NOTE — ED TRIAGE NOTES
Patient arrived to ER via squad with complaints of TIA symptoms. Pt states about 3 weeks ago he developed some weakness to his right arm and was unable to find words. Pt states today around 0540 he developed some weakness and numbness to his left arm. Pt also states he was speaking with a friend and was unable to find his words. Pt currently has no symptoms. Pt is alert and oriented. Appropriate to self. Pt is able to follow commands. No weakness, numbness at this time. Pushes and pulls are equal and strong. Pupils are equal and reactive bilaterally. Pt speech is clear. No facial droop noted. Dr Albarado Record at bedside to assess patient. Code Bat called. OT 94. Pt down to CT scan Immediately.

## 2018-02-11 LAB
C-REACTIVE PROTEIN: 11.3 MG/L (ref 0–5)
FOLATE: >20 NG/ML (ref 7.3–26.1)
HCT VFR BLD CALC: 52.4 % (ref 42–52)
HEMOGLOBIN: 17.5 G/DL (ref 14–18)
HOMOCYSTEINE: 7.9 UMOL/L (ref 0–15)
MCH RBC QN AUTO: 29.5 PG (ref 27–31.3)
MCHC RBC AUTO-ENTMCNC: 33.5 % (ref 33–37)
MCV RBC AUTO: 88.2 FL (ref 80–100)
PDW BLD-RTO: 13.4 % (ref 11.5–14.5)
PLATELET # BLD: 226 K/UL (ref 130–400)
RBC # BLD: 5.95 M/UL (ref 4.7–6.1)
TSH SERPL DL<=0.05 MIU/L-ACNC: 0.3 UIU/ML (ref 0.27–4.2)
VITAMIN B-12: 650 PG/ML (ref 232–1245)
VITAMIN D 25-HYDROXY: 43.8 NG/ML (ref 30–100)
WBC # BLD: 6.6 K/UL (ref 4.8–10.8)

## 2018-02-11 PROCEDURE — 81241 F5 GENE: CPT

## 2018-02-11 PROCEDURE — 2580000003 HC RX 258: Performed by: INTERNAL MEDICINE

## 2018-02-11 PROCEDURE — G0378 HOSPITAL OBSERVATION PER HR: HCPCS

## 2018-02-11 PROCEDURE — 84160 ASSAY OF PROTEIN ANY SOURCE: CPT

## 2018-02-11 PROCEDURE — 82306 VITAMIN D 25 HYDROXY: CPT

## 2018-02-11 PROCEDURE — G9163 LANG EXPRESS GOAL STATUS: HCPCS

## 2018-02-11 PROCEDURE — 87275 INFLUENZA B AG IF: CPT

## 2018-02-11 PROCEDURE — G9162 LANG EXPRESS CURRENT STATUS: HCPCS

## 2018-02-11 PROCEDURE — 96366 THER/PROPH/DIAG IV INF ADDON: CPT

## 2018-02-11 PROCEDURE — 96372 THER/PROPH/DIAG INJ SC/IM: CPT

## 2018-02-11 PROCEDURE — 86140 C-REACTIVE PROTEIN: CPT

## 2018-02-11 PROCEDURE — 81291 MTHFR GENE: CPT

## 2018-02-11 PROCEDURE — 36415 COLL VENOUS BLD VENIPUNCTURE: CPT

## 2018-02-11 PROCEDURE — 96376 TX/PRO/DX INJ SAME DRUG ADON: CPT

## 2018-02-11 PROCEDURE — 84443 ASSAY THYROID STIM HORMONE: CPT

## 2018-02-11 PROCEDURE — 6360000002 HC RX W HCPCS: Performed by: INTERNAL MEDICINE

## 2018-02-11 PROCEDURE — 96125 COGNITIVE TEST BY HC PRO: CPT

## 2018-02-11 PROCEDURE — 87276 INFLUENZA A AG IF: CPT

## 2018-02-11 PROCEDURE — 84165 PROTEIN E-PHORESIS SERUM: CPT

## 2018-02-11 PROCEDURE — 83090 ASSAY OF HOMOCYSTEINE: CPT

## 2018-02-11 PROCEDURE — 87260 ADENOVIRUS AG IF: CPT

## 2018-02-11 PROCEDURE — 6370000000 HC RX 637 (ALT 250 FOR IP): Performed by: SPECIALIST

## 2018-02-11 PROCEDURE — 86039 ANTINUCLEAR ANTIBODIES (ANA): CPT

## 2018-02-11 PROCEDURE — 6370000000 HC RX 637 (ALT 250 FOR IP): Performed by: INTERNAL MEDICINE

## 2018-02-11 PROCEDURE — 92523 SPEECH SOUND LANG COMPREHEN: CPT

## 2018-02-11 PROCEDURE — 82746 ASSAY OF FOLIC ACID SERUM: CPT

## 2018-02-11 PROCEDURE — 87279 PARAINFLUENZA AG IF: CPT

## 2018-02-11 PROCEDURE — 82607 VITAMIN B-12: CPT

## 2018-02-11 PROCEDURE — 87280 RESPIRATORY SYNCYTIAL AG IF: CPT

## 2018-02-11 PROCEDURE — G9164 LANG EXPRESS D/C STATUS: HCPCS

## 2018-02-11 PROCEDURE — 87299 ANTIBODY DETECTION NOS IF: CPT

## 2018-02-11 PROCEDURE — 85027 COMPLETE CBC AUTOMATED: CPT

## 2018-02-11 RX ORDER — ATORVASTATIN CALCIUM 40 MG/1
40 TABLET, FILM COATED ORAL NIGHTLY
Qty: 30 TABLET | Refills: 3 | Status: SHIPPED | OUTPATIENT
Start: 2018-02-11 | End: 2018-02-13 | Stop reason: HOSPADM

## 2018-02-11 RX ORDER — METOCLOPRAMIDE HYDROCHLORIDE 5 MG/ML
10 INJECTION INTRAMUSCULAR; INTRAVENOUS EVERY 6 HOURS PRN
Status: DISCONTINUED | OUTPATIENT
Start: 2018-02-11 | End: 2018-02-13 | Stop reason: HOSPADM

## 2018-02-11 RX ORDER — LEVOFLOXACIN 750 MG/1
750 TABLET ORAL DAILY
Qty: 4 TABLET | Refills: 0 | Status: SHIPPED | OUTPATIENT
Start: 2018-02-11 | End: 2018-02-13

## 2018-02-11 RX ORDER — METOCLOPRAMIDE HYDROCHLORIDE 5 MG/ML
10 INJECTION INTRAMUSCULAR; INTRAVENOUS EVERY 6 HOURS
Status: DISCONTINUED | OUTPATIENT
Start: 2018-02-11 | End: 2018-02-11

## 2018-02-11 RX ADMIN — ONDANSETRON HYDROCHLORIDE 4 MG: 2 INJECTION, SOLUTION INTRAMUSCULAR; INTRAVENOUS at 14:31

## 2018-02-11 RX ADMIN — ASPIRIN 325 MG: 325 TABLET, COATED ORAL at 08:00

## 2018-02-11 RX ADMIN — Medication 10 ML: at 08:01

## 2018-02-11 RX ADMIN — ACETAMINOPHEN 650 MG: 325 TABLET ORAL at 14:31

## 2018-02-11 RX ADMIN — LEVOFLOXACIN 750 MG: 5 INJECTION, SOLUTION INTRAVENOUS at 10:33

## 2018-02-11 RX ADMIN — ATORVASTATIN CALCIUM 40 MG: 40 TABLET, FILM COATED ORAL at 20:52

## 2018-02-11 RX ADMIN — ACETAMINOPHEN 650 MG: 325 TABLET ORAL at 06:03

## 2018-02-11 RX ADMIN — FLUTICASONE PROPIONATE 1 SPRAY: 50 SPRAY, METERED NASAL at 08:00

## 2018-02-11 RX ADMIN — ACETAMINOPHEN 650 MG: 325 TABLET ORAL at 20:52

## 2018-02-11 RX ADMIN — Medication 10 ML: at 20:58

## 2018-02-11 RX ADMIN — CLOPIDOGREL BISULFATE 75 MG: 75 TABLET ORAL at 08:00

## 2018-02-11 RX ADMIN — ENOXAPARIN SODIUM 40 MG: 40 INJECTION SUBCUTANEOUS at 08:00

## 2018-02-11 RX ADMIN — PANTOPRAZOLE SODIUM 40 MG: 40 TABLET, DELAYED RELEASE ORAL at 06:03

## 2018-02-11 ASSESSMENT — PAIN DESCRIPTION - LOCATION
LOCATION: HEAD
LOCATION: HEAD

## 2018-02-11 ASSESSMENT — PAIN SCALES - GENERAL
PAINLEVEL_OUTOF10: 3
PAINLEVEL_OUTOF10: 6
PAINLEVEL_OUTOF10: 3
PAINLEVEL_OUTOF10: 2

## 2018-02-11 ASSESSMENT — PAIN DESCRIPTION - PAIN TYPE: TYPE: ACUTE PAIN
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
## 2018-02-11 NOTE — PROGRESS NOTES
NEUROLOGY INPATIENT PROGRESS NOTE    Olivia Preciado    MRN -  [id number]   Acct # - [de-identified]      - 1970    50 y.o. Subjective: The patient is seen in follow-up. Patient is alert at this time. No recurrent TIAs. Ambulated well. Balance is good. To shower by himself. Echo report pending. May need a ROS    Result Data:  CBC:   Recent Labs      02/10/18   0615  02/11/18   0513   WBC  7.8  6.6   HGB  19.0*  17.5   PLT  246  226     BMP:    Recent Labs      02/10/18   0610  02/10/18   0615   NA   --   140   K   --   4.2   CL   --   98   CO2   --   27   BUN   --   18   CREATININE   --   1.34*   GLUCOSE  94  114*     TSH:   Recent Labs      18   TSH  7.394     Folic Acid:   Recent Labs      18   FOLATE  >20.0     B12:    Lab Results   Component Value Date    [id number] 650 2018     Vit. D: No components found for: VITAMIND  Lipids:   Recent Labs      02/10/18   0615   CHOL  179   TRIG  122   HDL  33*   LDLCALC  122     Ammonia: No results for input(s): AMMONIA in the last 72 hours. LFT:   Recent Labs      02/10/18   0615   AST  31   ALT  39   BILITOT  0.5   ALKPHOS  72        Urine:   Recent Labs      02/10/18   0615   COLORU  Yellow   PHUR  6.5   CLARITYU  Clear   SPECGRAV  1.011   LEUKOCYTESUR  Negative   UROBILINOGEN  0.2   BILIRUBINUR  Negative   BLOODU  Negative        Imaging    Ct Head Wo Contrast    Result Date: 2/10/2018  CT HEAD WO CONTRAST CLINICAL HISTORY: Left-sided tingling and numbness Comparison: None TECHNIQUE: Multiple unenhanced serial axial images of the brain from the vertex of the skull to the base of the skull were performed. FINDINGS: The ventricles are dilated. This is compensatory to the surrounding moderate generalized parenchymal volume loss. No mass. No midline shift. The cisterns are patent. No acute intra-axial or extra-axial findings The visualized osseous structures are unremarkable.  The visualized portion of the paranasal sinuses, and mastoids are unremarkable. IMPRESSION NO ACUTE INTRA-AXIAL OR EXTRA-AXIAL FINDINGS. IF SIGNS OR SYMPTOMS PERSIST THEN CONSIDER REPEAT CT SCAN IN 12 TO 24 HOURS OR MRI TO FURTHER EVALUATE IF THERE ARE NO CONTRAINDICATIONS All CT scans at this facility use dose modulation, iterative reconstruction, and/or weight based dosing when appropriate to reduce radiation dose to as low as reasonably achievable. Mra Head W/o Contrast    Result Date: 2/10/2018  EXAMINATION: MRA HEAD WO CONTRAST DATE AND TIME:2/10/2018 6:45 AM CLINICAL HISTORY: Acute stroke. CAROTID STENOSIS  COMPARISON: None TECHNIQUE: 3-D time-of-flight MRA images of the intracranial circulation were obtained. FINDINGS:     Intracranial: Flow related enhancement is noted within the skull base, precavernous, cavernous, and supraclinoid segments of the internal carotid arteries bilaterally. Anterior cerebral arteries: Flow related enhancement is noted within the bilateral anterior cerebral arteries,                                         Middle cerebral arteries:  Unremarkable flow related enhancement is noted in the middle cerebral artery branches bilaterally. Posterior cervical arteries: The bilateral posterior cerebral arteries are patent. Basilar arteries: Flow is visualized in the basilar artery. Aneurysm: No aneurysm or dissection is seen. Normal MRA of the head     Xr Chest Portable    Result Date: 2/10/2018  XR CHEST PORTABLE: 2/10/2018 CLINICAL HISTORY: Right-sided weakness and left-sided numbness. COMPARISON: 12/13/2017. A portable upright AP radiograph of the chest was obtained. FINDINGS: There is no pulmonary infiltrate, significant pleural effusion, vascular congestion, pneumothorax, or displaced fractures identified. The cardiac and mediastinal silhouettes appear within normal limits for technique.      NO

## 2018-02-12 ENCOUNTER — APPOINTMENT (OUTPATIENT)
Dept: CARDIAC CATH/INVASIVE PROCEDURES | Age: 48
DRG: 069 | End: 2018-02-12
Payer: COMMERCIAL

## 2018-02-12 LAB
ALBUMIN SERPL-MCNC: 3.7 G/DL (ref 3.75–5.01)
ALPHA-1-GLOBULIN: 0.38 G/DL (ref 0.19–0.46)
ALPHA-2-GLOBULIN: 0.79 G/DL (ref 0.48–1.05)
ANA BY IFA: ABNORMAL
BETA GLOBULIN: 0.93 G/DL (ref 0.48–1.1)
GAMMA GLOBULIN: 1.6 G/DL (ref 0.62–1.51)
GFR AFRICAN AMERICAN: >60
GFR NON-AFRICAN AMERICAN: 50
INR BLD: 1.6
PERFORMED ON: ABNORMAL
PERFORMED ON: NORMAL
POC CREATININE: 1.5 MG/DL (ref 0.9–1.3)
POC SAMPLE TYPE: ABNORMAL
POC SAMPLE TYPE: NORMAL
PROTEIN ELECTROPHORESIS, SERUM: ABNORMAL
RAPID INFLUENZA  B AGN: NEGATIVE
RAPID INFLUENZA A AGN: NEGATIVE
SPE/IFE INTERPRETATION: ABNORMAL
TOTAL PROTEIN: 7.4 G/DL (ref 6–8.3)

## 2018-02-12 PROCEDURE — B246ZZ4 ULTRASONOGRAPHY OF RIGHT AND LEFT HEART, TRANSESOPHAGEAL: ICD-10-PCS | Performed by: INTERNAL MEDICINE

## 2018-02-12 PROCEDURE — 96366 THER/PROPH/DIAG IV INF ADDON: CPT

## 2018-02-12 PROCEDURE — 2580000003 HC RX 258

## 2018-02-12 PROCEDURE — G0378 HOSPITAL OBSERVATION PER HR: HCPCS

## 2018-02-12 PROCEDURE — 96375 TX/PRO/DX INJ NEW DRUG ADDON: CPT

## 2018-02-12 PROCEDURE — 6360000002 HC RX W HCPCS: Performed by: INTERNAL MEDICINE

## 2018-02-12 PROCEDURE — 93325 DOPPLER ECHO COLOR FLOW MAPG: CPT | Performed by: INTERNAL MEDICINE

## 2018-02-12 PROCEDURE — 6370000000 HC RX 637 (ALT 250 FOR IP): Performed by: INTERNAL MEDICINE

## 2018-02-12 PROCEDURE — 96376 TX/PRO/DX INJ SAME DRUG ADON: CPT

## 2018-02-12 PROCEDURE — 93312 ECHO TRANSESOPHAGEAL: CPT

## 2018-02-12 PROCEDURE — 93312 ECHO TRANSESOPHAGEAL: CPT | Performed by: INTERNAL MEDICINE

## 2018-02-12 PROCEDURE — 93321 DOPPLER ECHO F-UP/LMTD STD: CPT

## 2018-02-12 PROCEDURE — 86403 PARTICLE AGGLUT ANTBDY SCRN: CPT

## 2018-02-12 PROCEDURE — 6360000002 HC RX W HCPCS

## 2018-02-12 PROCEDURE — 1210000000 HC MED SURG R&B

## 2018-02-12 PROCEDURE — 96372 THER/PROPH/DIAG INJ SC/IM: CPT

## 2018-02-12 PROCEDURE — 93325 DOPPLER ECHO COLOR FLOW MAPG: CPT

## 2018-02-12 PROCEDURE — 2580000003 HC RX 258: Performed by: INTERNAL MEDICINE

## 2018-02-12 RX ORDER — MIDAZOLAM HYDROCHLORIDE 1 MG/ML
INJECTION INTRAMUSCULAR; INTRAVENOUS
Status: COMPLETED | OUTPATIENT
Start: 2018-02-12 | End: 2018-02-12

## 2018-02-12 RX ORDER — ASPIRIN 81 MG/1
81 TABLET, CHEWABLE ORAL DAILY
Status: DISCONTINUED | OUTPATIENT
Start: 2018-02-13 | End: 2018-02-13 | Stop reason: HOSPADM

## 2018-02-12 RX ORDER — FENTANYL CITRATE 50 UG/ML
50 INJECTION, SOLUTION INTRAMUSCULAR; INTRAVENOUS ONCE
Status: COMPLETED | OUTPATIENT
Start: 2018-02-12 | End: 2018-02-12

## 2018-02-12 RX ORDER — MIDAZOLAM HYDROCHLORIDE 1 MG/ML
5 INJECTION INTRAMUSCULAR; INTRAVENOUS ONCE
Status: COMPLETED | OUTPATIENT
Start: 2018-02-12 | End: 2018-02-12

## 2018-02-12 RX ORDER — SODIUM CHLORIDE 9 MG/ML
INJECTION, SOLUTION INTRAVENOUS
Status: COMPLETED
Start: 2018-02-12 | End: 2018-02-12

## 2018-02-12 RX ORDER — BACTERIOSTATIC SODIUM CHLORIDE 0.9 %
VIAL (ML) INJECTION
Status: DISPENSED
Start: 2018-02-12 | End: 2018-02-13

## 2018-02-12 RX ADMIN — Medication 10 ML: at 09:00

## 2018-02-12 RX ADMIN — ACETAMINOPHEN 650 MG: 325 TABLET ORAL at 21:21

## 2018-02-12 RX ADMIN — MIDAZOLAM HYDROCHLORIDE 2 MG: 1 INJECTION, SOLUTION INTRAMUSCULAR; INTRAVENOUS at 14:09

## 2018-02-12 RX ADMIN — ATORVASTATIN CALCIUM 40 MG: 40 TABLET, FILM COATED ORAL at 21:21

## 2018-02-12 RX ADMIN — LEVOFLOXACIN 750 MG: 5 INJECTION, SOLUTION INTRAVENOUS at 10:59

## 2018-02-12 RX ADMIN — Medication 10 ML: at 10:59

## 2018-02-12 RX ADMIN — FENTANYL CITRATE 25 MCG: 50 INJECTION, SOLUTION INTRAMUSCULAR; INTRAVENOUS at 14:10

## 2018-02-12 RX ADMIN — MIDAZOLAM HYDROCHLORIDE 1 MG: 1 INJECTION, SOLUTION INTRAMUSCULAR; INTRAVENOUS at 14:15

## 2018-02-12 RX ADMIN — FENTANYL CITRATE 50 MCG: 50 INJECTION, SOLUTION INTRAMUSCULAR; INTRAVENOUS at 14:06

## 2018-02-12 RX ADMIN — ONDANSETRON HYDROCHLORIDE 4 MG: 2 INJECTION, SOLUTION INTRAMUSCULAR; INTRAVENOUS at 16:54

## 2018-02-12 RX ADMIN — MIDAZOLAM HYDROCHLORIDE 1 MG: 1 INJECTION, SOLUTION INTRAMUSCULAR; INTRAVENOUS at 14:10

## 2018-02-12 RX ADMIN — ENOXAPARIN SODIUM 40 MG: 40 INJECTION SUBCUTANEOUS at 21:21

## 2018-02-12 RX ADMIN — FLUTICASONE PROPIONATE 1 SPRAY: 50 SPRAY, METERED NASAL at 09:00

## 2018-02-12 RX ADMIN — SODIUM CHLORIDE 1000 ML: 9 INJECTION, SOLUTION INTRAVENOUS at 13:37

## 2018-02-12 RX ADMIN — Medication 10 ML: at 21:23

## 2018-02-12 ASSESSMENT — PAIN SCALES - GENERAL: PAINLEVEL_OUTOF10: 6

## 2018-02-12 NOTE — PROGRESS NOTES
Dr. Hodges Aw spoke with wife after procedure. Pt had been dozing and woke up and asked how everything went?  ca

## 2018-02-12 NOTE — PROGRESS NOTES
[facility] Occupational Therapy      Date: 2018  Patient Name: Perry Shepherd        MRN: [id number]  Account: [de-identified]   : 1970  (50 y.o.)  Room: [place] [address]    Pt. is of observation status. To comply with medicare and insurance regulations, to see patient we require updated orders including a valid OT treatment diagnosis. Please reorder as appropriate.      Electronically signed by CHANDAN Solorzano on 2018 at 7:39 AM

## 2018-02-12 NOTE — CARE COORDINATION
VIKW along with the care team met with pt to discuss DC needs. Pt is from home with his wife and he is independent. Pt had TIA symptoms but they have all resolved at this point. Pt plans to return home at DC. No needs identified.

## 2018-02-12 NOTE — PROGRESS NOTES
Hospitalist Progress Note      PCP: Alyse Pryor MD    Date of Admission: 2/10/2018    Chief Complaint:    Chief Complaint   Patient presents with    Transient Ischemic Attack     Subjective:  Patient feels much better; his nasal congestion and headache have improved. Does state that he did have nausea and vomiting yesterday; he's a little less nauseous today but hasn't attempted to eat. 12 point ROS negative other than mentioned above     Medications:  Reviewed    Infusion Medications    Scheduled Medications    sodium chloride flush  10 mL Intravenous 2 times per day    enoxaparin  40 mg Subcutaneous Daily    aspirin  325 mg Oral Daily    atorvastatin  40 mg Oral Nightly    fluticasone  1 spray Each Nare Daily    pantoprazole  40 mg Oral QAM AC    levofloxacin  750 mg Intravenous Q24H    clopidogrel  75 mg Oral Daily     PRN Meds: metoclopramide, sodium chloride flush, acetaminophen, magnesium hydroxide, ondansetron, labetalol, sodium chloride      Intake/Output Summary (Last 24 hours) at 02/12/18 1222  Last data filed at 02/11/18 2058   Gross per 24 hour   Intake              490 ml   Output                0 ml   Net              490 ml       Exam:    /64   Pulse 87   Temp 97.9 °F (36.6 °C) (Oral)   Resp 16   Ht 5' 9\" (1.753 m)   Wt 200 lb 13.4 oz (91.1 kg)   SpO2 96%   BMI 29.66 kg/m²     General appearance: No apparent distress, appears stated age and cooperative. HEENT: Pupils equal, round, and reactive to light. Conjunctivae/corneas clear. Neck: Supple, with full range of motion. No jugular venous distention. Trachea midline. Respiratory:  Normal respiratory effort. Clear to auscultation, bilaterally without Rales/Wheezes/Rhonchi. Cardiovascular: Regular rate and rhythm with normal S1/S2 without murmurs, rubs or gallops. Abdomen: Soft, non-tender, non-distended with normal bowel sounds. Musculoskeletal: No clubbing, cyanosis or edema bilaterally.     Neuro: Non Focal.   Capillary Refill: Brisk,< 3 seconds   Peripheral Pulses: +2 palpable, equal bilaterally     Labs:   Recent Labs      02/10/18   0615  02/11/18   0513   WBC  7.8  6.6   HGB  19.0*  17.5   HCT  55.8*  52.4*   PLT  246  226     Recent Labs      02/10/18   0615  02/10/18   0635   NA  140   --    K  4.2   --    CL  98   --    CO2  27   --    BUN  18   --    CREATININE  1.34*  1.5*   CALCIUM  11.3*   --      Recent Labs      02/10/18   0615   AST  31   ALT  39   BILITOT  0.5   ALKPHOS  72     Recent Labs      02/10/18   0641  02/10/18   0651   INR  1.6  1.1     Recent Labs      02/10/18   0615   CKTOTAL  169   TROPONINI  <0.010     Urinalysis:      Lab Results   Component Value Date    NITRU Negative 02/10/2018    BLOODU Negative 02/10/2018    SPECGRAV 1.011 02/10/2018    GLUCOSEU Negative 02/10/2018     Radiology:  mra head w/o contrast   Final Result   Normal MRA of the head            MRI BRAIN W WO CONTRAST   Final Result   NO ACUTE INTRACRANIAL PATHOLOGY. EXAMINATION: MRI BRAIN W WO CONTRAST      DATE AND TIME:2/10/2018 1:30 PM      CLINICAL HISTORY: Acute stroke. TIA        COMPARISON: None      TECHNIQUE: 3-D time-of-flight MRA images of the intracranial circulation were obtained. FINDINGS:           Flow related enhancement is noted within the skull base, precavernous, cavernous, and supraclinoid segments of the internal carotid arteries bilaterally. There is a persistent primitive trigeminal artery arising from the junction between the    petrous and cavernous right internal carotid artery extending to the distal basilar artery. There is mild atherosclerotic disease in the left PCA. The right posterior cerebral artery is supplied off the ipsilateral right internal carotid artery. There is    diminished flow void throughout the hypoplastic appearing basilar artery.           Anterior cerebral arteries: Flow related enhancement is noted within the bilateral anterior cerebral arteries, Middle cerebral arteries:  Unremarkable flow related enhancement is noted in the middle cerebral artery branches bilaterally. Aneurysm: No aneurysm or dissection is seen. IMPRESSION: THERE IS A PERSISTENT TRIGEMINAL ARTERY NOTED. MILD ATHEROSCLEROTIC DISEASE INVOLVING THE LEFT POSTERIOR CEREBRAL ARTERY. HYPOPLASTIC BASILAR ARTERY. NO ANEURYSM. REMAINDER OF THE MRA OF THE HEAD IS UNREMARKABLE                                        US CAROTID ARTERY BILATERAL   Final Result      XR CHEST PORTABLE   Final Result      NO EVIDENCE OF ACTIVE CARDIOPULMONARY DISEASE. CT Head WO Contrast   Final Result        Assessment/Plan:    1. TIA - Questionable that there might have been 2 TIAs per neurology; plan for a ROS as a cardiac origin would explain two TIAs in two separate areas  2. Persistent headache secondary to bacterial sinusitis - Improved with levofloxacin; can be discharged to complete a 5 day course when ok with neurology  3. Nausea/ Vomiting - per Pt the nausea is improving; cannot assess vomiting as he has not ate today; will try clears after the procedure. GI consulted by neurolog for evaluation  4. CKD secondary to RCC - monitor and renally dose meds  5. Ulcerative colitis - Monitor     5. Functional Status: Fall precautions. Up with assistance. PT OT  6. Diet: Cardiac Diabetic   7. DVT ppx: Lovenox SCDs  8. Disposition: Dependent on hospital course. Will discharge once medically stable. SW on board for discharge planning. Active Hospital Problems    Diagnosis Date Noted    TIA (transient ischemic attack) [G45.9] 02/10/2018      Additional work up or/and treatment plan may be added today or then after based on clinical progression. I am managing a portion of pt care. Some medical issues are handled by other specialists. Additional work up and treatment should be done in out pt setting by pt PCP and other out pt providers. In addition to examining and evaluating pt, I spent additional time explaining care, normal and abnormal findings, and treatment plan. All of pt questions were answered. Counseling, diet and education were  provided. Case will be discussed with nursing staff when appropriate. Family will be updated if and when appropriate.       Diet: DIET CARDIAC;    Code Status: Full Code    Electronically signed by Cherri Thakkar MD on 2/12/2018 at 12:22 PM

## 2018-02-13 VITALS
WEIGHT: 200.84 LBS | HEIGHT: 69 IN | HEART RATE: 83 BPM | TEMPERATURE: 97.5 F | SYSTOLIC BLOOD PRESSURE: 136 MMHG | RESPIRATION RATE: 16 BRPM | DIASTOLIC BLOOD PRESSURE: 78 MMHG | OXYGEN SATURATION: 98 % | BODY MASS INDEX: 29.75 KG/M2

## 2018-02-13 LAB
ADENOVIRUS, DFA: NEGATIVE
INFLUENZA A,DFA: NEGATIVE
INFLUENZA B,DFA: NEGATIVE
METAPNEUMOVIRUS, DFA: NEGATIVE
PARAINFLUENZA 1 DFA STAIN: NEGATIVE
PARAINFLUENZA 2 DFA STAIN: NEGATIVE
PARAINFLUENZA 3 DFA STAIN: NEGATIVE
RESPIRATORY SYNCYTIAL VIRUS  (RSV) DFA: NEGATIVE
RSPFA SOURCE: NORMAL

## 2018-02-13 PROCEDURE — G8988 SELF CARE GOAL STATUS: HCPCS

## 2018-02-13 PROCEDURE — G8978 MOBILITY CURRENT STATUS: HCPCS

## 2018-02-13 PROCEDURE — G8979 MOBILITY GOAL STATUS: HCPCS

## 2018-02-13 PROCEDURE — G8987 SELF CARE CURRENT STATUS: HCPCS

## 2018-02-13 PROCEDURE — 2580000003 HC RX 258: Performed by: INTERNAL MEDICINE

## 2018-02-13 PROCEDURE — G8989 SELF CARE D/C STATUS: HCPCS

## 2018-02-13 PROCEDURE — 96366 THER/PROPH/DIAG IV INF ADDON: CPT

## 2018-02-13 PROCEDURE — 6360000002 HC RX W HCPCS: Performed by: INTERNAL MEDICINE

## 2018-02-13 PROCEDURE — 6370000000 HC RX 637 (ALT 250 FOR IP): Performed by: INTERNAL MEDICINE

## 2018-02-13 PROCEDURE — 6370000000 HC RX 637 (ALT 250 FOR IP): Performed by: SPECIALIST

## 2018-02-13 PROCEDURE — 96376 TX/PRO/DX INJ SAME DRUG ADON: CPT

## 2018-02-13 PROCEDURE — 6370000000 HC RX 637 (ALT 250 FOR IP): Performed by: PSYCHIATRY & NEUROLOGY

## 2018-02-13 PROCEDURE — G0378 HOSPITAL OBSERVATION PER HR: HCPCS

## 2018-02-13 PROCEDURE — G8980 MOBILITY D/C STATUS: HCPCS

## 2018-02-13 PROCEDURE — 97165 OT EVAL LOW COMPLEX 30 MIN: CPT

## 2018-02-13 PROCEDURE — 97161 PT EVAL LOW COMPLEX 20 MIN: CPT

## 2018-02-13 PROCEDURE — 86255 FLUORESCENT ANTIBODY SCREEN: CPT

## 2018-02-13 RX ORDER — ASPIRIN 81 MG/1
81 TABLET, CHEWABLE ORAL DAILY
Qty: 30 TABLET | Refills: 3 | Status: SHIPPED | OUTPATIENT
Start: 2018-02-14

## 2018-02-13 RX ORDER — LEVOFLOXACIN 750 MG/1
750 TABLET ORAL DAILY
Qty: 1 TABLET | Refills: 0 | Status: SHIPPED | OUTPATIENT
Start: 2018-02-13 | End: 2018-02-14

## 2018-02-13 RX ORDER — ASPIRIN 81 MG/1
81 TABLET, CHEWABLE ORAL DAILY
Qty: 30 TABLET | Refills: 3 | Status: SHIPPED | OUTPATIENT
Start: 2018-02-14 | End: 2018-02-13

## 2018-02-13 RX ADMIN — ACETAMINOPHEN 650 MG: 325 TABLET ORAL at 11:03

## 2018-02-13 RX ADMIN — ASPIRIN 81 MG 81 MG: 81 TABLET ORAL at 11:03

## 2018-02-13 RX ADMIN — FLUTICASONE PROPIONATE 1 SPRAY: 50 SPRAY, METERED NASAL at 08:46

## 2018-02-13 RX ADMIN — CLOPIDOGREL BISULFATE 75 MG: 75 TABLET ORAL at 11:03

## 2018-02-13 RX ADMIN — LEVOFLOXACIN 750 MG: 5 INJECTION, SOLUTION INTRAVENOUS at 11:03

## 2018-02-13 RX ADMIN — Medication 10 ML: at 11:04

## 2018-02-13 RX ADMIN — ONDANSETRON HYDROCHLORIDE 4 MG: 2 INJECTION, SOLUTION INTRAMUSCULAR; INTRAVENOUS at 08:46

## 2018-02-13 RX ADMIN — PANTOPRAZOLE SODIUM 40 MG: 40 TABLET, DELAYED RELEASE ORAL at 05:24

## 2018-02-13 ASSESSMENT — PAIN SCALES - GENERAL: PAINLEVEL_OUTOF10: 2

## 2018-02-13 NOTE — PROGRESS NOTES
Hospitalist Progress Note      PCP: Haroon Stephens MD    Date of Admission: 2/10/2018    Chief Complaint:    Chief Complaint   Patient presents with    Transient Ischemic Attack     Subjective:  Patient is still having nausea and only tolerating clear liquids at this time. 12 point ROS negative other than mentioned above     Medications:  Reviewed    Infusion Medications    Scheduled Medications    aspirin  81 mg Oral Daily    sodium chloride flush  10 mL Intravenous 2 times per day    enoxaparin  40 mg Subcutaneous Daily    atorvastatin  40 mg Oral Nightly    fluticasone  1 spray Each Nare Daily    pantoprazole  40 mg Oral QAM AC    levofloxacin  750 mg Intravenous Q24H    clopidogrel  75 mg Oral Daily     PRN Meds: metoclopramide, sodium chloride flush, acetaminophen, magnesium hydroxide, ondansetron, labetalol, sodium chloride      Intake/Output Summary (Last 24 hours) at 02/13/18 1312  Last data filed at 02/13/18 0929   Gross per 24 hour   Intake              720 ml   Output                0 ml   Net              720 ml       Exam:    /80   Pulse 81   Temp 97.9 °F (36.6 °C) (Oral)   Resp 18   Ht 5' 9\" (1.753 m)   Wt 200 lb 13.4 oz (91.1 kg)   SpO2 97%   BMI 29.66 kg/m²     General appearance: No apparent distress, appears stated age and cooperative. HEENT: Pupils equal, round, and reactive to light. Conjunctivae/corneas clear. Neck: Supple, with full range of motion. No jugular venous distention. Trachea midline. Respiratory:  Normal respiratory effort. Clear to auscultation, bilaterally without Rales/Wheezes/Rhonchi. Cardiovascular: Regular rate and rhythm with normal S1/S2 without murmurs, rubs or gallops. Abdomen: Soft, non-tender, non-distended with normal bowel sounds. Musculoskeletal: No clubbing, cyanosis or edema bilaterally.     Neuro: Non Focal.   Capillary Refill: Brisk,< 3 seconds   Peripheral Pulses: +2 palpable, equal bilaterally     Labs:   Recent Labs      02/11/18 Code    Electronically signed by Jonathon Maier MD on 2/13/2018 at 1:12 PM

## 2018-02-13 NOTE — FLOWSHEET NOTE
Message sent to Dr. Amparo Meier to clarify blood thinners on discharge and to make sure it is ok from his standpoint to discharge patient.

## 2018-02-13 NOTE — PROGRESS NOTES
visualized portion of the paranasal sinuses, and mastoids are unremarkable. IMPRESSION NO ACUTE INTRA-AXIAL OR EXTRA-AXIAL FINDINGS. IF SIGNS OR SYMPTOMS PERSIST THEN CONSIDER REPEAT CT SCAN IN 12 TO 24 HOURS OR MRI TO FURTHER EVALUATE IF THERE ARE NO CONTRAINDICATIONS All CT scans at this facility use dose modulation, iterative reconstruction, and/or weight based dosing when appropriate to reduce radiation dose to as low as reasonably achievable. Mra Head W/o Contrast    Result Date: 2/10/2018  EXAMINATION: MRA HEAD WO CONTRAST DATE AND TIME:2/10/2018 6:45 AM CLINICAL HISTORY: Acute stroke. CAROTID STENOSIS  COMPARISON: None TECHNIQUE: 3-D time-of-flight MRA images of the intracranial circulation were obtained. FINDINGS:     Intracranial: Flow related enhancement is noted within the skull base, precavernous, cavernous, and supraclinoid segments of the internal carotid arteries bilaterally. Anterior cerebral arteries: Flow related enhancement is noted within the bilateral anterior cerebral arteries,                                         Middle cerebral arteries:  Unremarkable flow related enhancement is noted in the middle cerebral artery branches bilaterally. Posterior cervical arteries: The bilateral posterior cerebral arteries are patent. Basilar arteries: Flow is visualized in the basilar artery. Aneurysm: No aneurysm or dissection is seen. Normal MRA of the head     Xr Chest Portable    Result Date: 2/10/2018  XR CHEST PORTABLE: 2/10/2018 CLINICAL HISTORY: Right-sided weakness and left-sided numbness. COMPARISON: 12/13/2017. A portable upright AP radiograph of the chest was obtained. FINDINGS: There is no pulmonary infiltrate, significant pleural effusion, vascular congestion, pneumothorax, or displaced fractures identified. The cardiac and mediastinal silhouettes appear within normal limits for technique.      NO

## 2018-02-13 NOTE — CARE COORDINATION
MET WITH PATIENT DURING QUALITY ROUNDS TO DISCUSS D/C NEEDS. HE STILL PLANS TO GO HOME BUT NEEDS TO BE SEEN BY GI FOR N/V. NEURO WORK UP COMPLETED.  Electronically signed by Concha Rios RN on 2/13/2018 at 10:02 AM

## 2018-02-13 NOTE — PROGRESS NOTES
Other:      Previous Functional Status:  Pt was independent with adl and homemaking tasks. Pt was also working and driving.     Pain: Pt reports headache, not quantified  Start of session:  /10  Description:    Location:    End of session:  /10  Action: [] No Action Necessary    [x] Patient reports pain at acceptable level for treatment  [] Nursing notified    [] Other       Objective:  Observation:  Pt supine in bed    Orientation: Oriented to  [x] Person   [x] Place  [x]Time    Vision:   [x]  WFL   [] Impaired  Comments:      Hearing:  [x] WFL   [] Impaired  Comments:    Sensation:   [x] WFL   []  Impaired   Comments:      Cognition:   [] WFL   [x] Impaired  Comments:Mild problem solving difficulty  Communication:   [] WFL   [x] Impaired  Comments:Mild word finding difficulty  Hand Dominance: [x] Right   [] Left    Range of Motion:  R UE AROM/PROM: [x]  WFL [] Impaired  Comments:   L UE AROM/PROM:  [x]  WFL [] Impaired  Comments:     Strength:   R UE Strength: []1    [] 2   [] 2+   [] 3   [] 3+   [] 4   [] 4+  [x] 5  Comments:   L UE Strength:  []1    [] 2   [] 2+   [] 3   [] 3+  [] 4   [] 4+   [x] 5  Comments:     Quality of Movement:  [x] Good   [] Fair   [] Poor     Coordination:  Gross motor: [x] WFL   [] Impaired   Fine motor: [x] WFL   [] Impaired     Functional Mobility:  Toilet Transfers:  independent  Bed Transfer:  independent  Sit to stand: independent  Bed to Chair:  independent    Seated Balance:      Static: [x] Good  [] Fair   [] Poor   Dynamic: [x]  Good  [] Fair   [] Poor     Standing Balance:     Static: [x] Good   [] Fair  [] Poor   Dynamic: [x] Good   [] Fair   [] Poor     Functional Endurance: [x] Good  [] Fair  [] Poor     ADLs  Feeding:   Positive hand to mouth  UE Dressing:  independent  LB Dressing:  independent  Bathing:  independent  Toileting: independent  Grooming: independent      Patient Goal: To return to home  Discussed and agreed upon: [x] Yes   [] No         Comments: Assessment/Discharge Disposition:     Prognosis: Good  Discharge Recommendations: Continue to assess pending progress, Home independently  No Skilled OT: At baseline function, Independent with ADL's  History: multiple  Exam: 2 deficits  Assistance / Modification: no assist    Prognosis:  [x] Good   []Fair   [] Poor     Barriers to Improvement:       Recommended DME:  [] W/W   [] Arin Barbour   [] Rollator   [] W/C   [] Brando Do  [] Shower Chair   []Dressing AD []  [facility]  [] Other:    Plan: ,  N/A    G-Codes:  OT G-codes  Functional Limitation: Self care  Self Care Current Status (): 0 percent impaired, limited or restricted  Self Care Goal Status (): 0 percent impaired, limited or restricted  Self Care Discharge Status (): 0 percent impaired, limited or restricted    Time in:  0745  Time out:  0810  Timed treatment minutes:  25  Total treatment time/minutes:  25    Electronically signed by:    Jeffery Solorzano OT  2/17/7659, 8:16 AM Electronically signed by NATALEE Lieberman/L on 0/97/62 at 8:24 AM

## 2018-02-13 NOTE — PROGRESS NOTES
assessed for rehabilitation services?: Yes  Family / Caregiver Present: No    Restrictions:  Restrictions/Precautions: Fall Risk  Body mass index is 29.66 kg/m². SUBJECTIVE: Subjective: \"I am doing much better. \"       Post Treatment Pain Screening:   Pain Screening  Patient Currently in Pain: No    Prior Level of Function:  Social/Functional History  Lives With: Spouse  Type of Home: House  Home Layout: Two level (12 steps with 1 hr)  Home Access: Stairs to enter without rails  Entrance Stairs - Number of Steps: 2  Bathroom Shower/Tub: Tub only, Walk-in shower  Bathroom Equipment: Shower chair  Home Equipment:  (no DME)  ADL Assistance: Independent  Homemaking Assistance: Independent  Homemaking Responsibilities: Yes (shared)  Ambulation Assistance: Independent (no AD)  Transfer Assistance: Independent  Active : Yes    OBJECTIVE:   Vision/Hearing:  Vision: Impaired  Vision Exceptions: Wears glasses for reading  Hearing: Within functional limits  Hearing Exceptions: Hard of hearing/hearing concerns    Cognition:  Overall Orientation Status: Within Normal Limits  Follows Commands: Within Functional Limits    Observation/Palpation  Observation: no acute distress noted    ROM:  RLE AROM: WFL  LLE AROM : WFL    Strength:  Strength RLE  Strength RLE: WFL  Strength LLE  Strength LLE: WFL    Neuro:  Balance  Posture: Good  Sitting - Static: Good  Sitting - Dynamic: Good  Standing - Static: Good  Standing - Dynamic: GoodNO apparent deficits, denies paraesthesia, visual tracking WNL, sensation intact to light touch, heel shin WNL, finger nose WNL     Motor Control  Gross Motor?: WFL  Sensation  Overall Sensation Status: WFL    Bed mobility  Supine to Sit: Independent  Sit to Supine: Independent    Transfers  Sit to Stand: Independent  Stand to sit:  Independent    Ambulation  Ambulation?: Yes  Ambulation 1  Surface: level tile  Device: No Device  Assistance: Independent  Distance: 300ft  Comments: no LOB, no apparent

## 2018-02-13 NOTE — PROGRESS NOTES
EVIDENCE OF ACTIVE CARDIOPULMONARY DISEASE. Mri Brain W Wo Contrast    Result Date: 2/10/2018  EXAMINATION: MRI BRAIN W WO CONTRAST DATE AND TIME:2/10/2018 1:30 PM CLINICAL HISTORY: Aphasia   TIA  COMPARISON: None TECHNIQUE:  Multi-sequence multiplanar imaging of the brain was performed. Sequences included T1-weighted, T2-weighted, FLAIR, diffusion-weighted, ADC maps, and  susceptibility weighted imaging. VOLUME: 18 mL   MR CONTRAST: ProHance FINDINGS:   Ventricles are midline in position and normal in size. There are a few scattered white matter hyperintensities which can be a normal finding in a successful aging brain. No focal mass lesions. No acute blood products. 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 No restricted diffusion to suggest any acute ischemic event. The brainstem, and craniocervical junction is unremarkable. The  mastoids and visualized paranasal sinuses are clear. Skull base is normal. The visualized portions of the orbits are unremarkable. NO ACUTE INTRACRANIAL PATHOLOGY. EXAMINATION: MRI BRAIN W WO CONTRAST DATE AND TIME:2/10/2018 1:30 PM CLINICAL HISTORY: Acute stroke. TIA  COMPARISON: None TECHNIQUE: 3-D time-of-flight MRA images of the intracranial circulation were obtained. FINDINGS:      Flow related enhancement is noted within the skull base, precavernous, cavernous, and supraclinoid segments of the internal carotid arteries bilaterally. 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 There is mild atherosclerotic disease in the left PCA. The right posterior cerebral artery is supplied off the ipsilateral right internal carotid artery. There is  diminished flow void throughout the hypoplastic appearing basilar artery.      Anterior cerebral problem with the antiplatelet medications.   No recurrent migraine since admission      Magali Fontanez MD

## 2018-02-13 NOTE — CONSULTS
regular  ABDOMEN:  Soft, nontender, no palpable mass. The patient states that he  has been able to pass rectal gas. Abdomen is not distended. Bowel sounds  are present. Labs shows normal electrolytes. White count is 6.6, hemoglobin and  hematocrit is 17.5 and 52.4, platelet count is 772. ProTime is 11.2. MRA  was normal.    IMPRESSION:  Nausea and vomiting. The patient has a history of acid reflux  disease, unclear if this is related to migraine. Other possibilities would  be gastritis or acid reflux disease. The patient has not had any emesis in  the last few hours and he has expressed a desire to be discharged home. My  suggestion would be to advance his feeding and if he tolerates, he could be  discharged home. Continue the _____.       Bradley Carter MD    D: 02/13/2018 15:12:06       T: 02/13/2018 18:23:44     [email or web address]  Job#: [id number]     Doc#: [id number]    CC:  Waqar Daniels MD

## 2018-02-14 ENCOUNTER — TELEPHONE (OUTPATIENT)
Dept: PRIMARY CARE CLINIC | Age: 48
End: 2018-02-14

## 2018-02-14 LAB — PRELIMINARY: NORMAL

## 2018-02-15 ENCOUNTER — TELEPHONE (OUTPATIENT)
Dept: PRIMARY CARE CLINIC | Age: 48
End: 2018-02-15

## 2018-02-15 LAB
DOUBLE STRANDED DNA AB, IGG: NORMAL
FACTOR V LEIDEN: NEGATIVE
MTHFR BY PCR SPECIMEN: ABNORMAL
MTHFR INTERPRETATION: ABNORMAL
MTHFR MUTATION A1298C: NEGATIVE
MTHFR MUTATION C677T: ABNORMAL

## 2018-02-15 PROCEDURE — 93010 ELECTROCARDIOGRAM REPORT: CPT | Performed by: INTERNAL MEDICINE

## 2018-02-15 NOTE — DISCHARGE SUMMARY
that were indicated and/or required as been addressed and set up by Social Work. Condition at discharge: Pt was medically stable at the time of discharge. Activity: activity as tolerated    Total time taken for discharging this patient: 40 minutes. Greater than 70% of time was spent focused exclusively on this patient. Time was taken to review chart, discuss plans with consultants, reconciling medications, discussing plan answering questions with patient.      Ro Agosto  2/15/2018, 8:20 AM  ----------------------------------------------------------------------------------------------------------------------    Wilbert Miller

## 2018-02-15 NOTE — TELEPHONE ENCOUNTER
Sheldon Haynes 1626 IP Discharge Follow up Call    Date of discharge: 2-13-18  Facility: [facility] [place]  Non-face-to-face services provided:  Obtained and reviewed discharge summary and/or continuity of care documents    Reason for Hospital Visit:  Transient cerebral ischemia  Discharged with Home Health?:  No    Date of first visit after discharge:  Patient declines appt with PCP at this time and wishes to follow up with neurology at this time  Status:     not changed    Did you receive a discharge summary with list of medication from the hospital? Yes  Review of Instructions:     Understands what to report/when to return?:  Yes   Understands discharge instructions?:  Yes   Following discharge instructions?:  Yes   If not why? Is there any lingering symptoms? Yes -headaches  Are you eating and drinking OK? No: due to headaches  Any other problems i.e. Constipation, other symptoms? No  Are there any new complaints of pain? No  If you have a wound is the dressing clean, dry, and intact? N/A-none per AVS  Understands wound care regimen? N/A  Are there any other complaints/concerns that you wish to tell your provider? Patient states he is not really doing any better since discharge. Patient complains of persistent headache since hospitalization. Patient states he is following up with Dr STOVER [facility] regarding this and declines appt with PCP until after neurology follow up. He states he is dizzy with the headaches. FU appts/Provider:    Future Appointments  Date Time Provider [place]   12/20/2018 2:00 PM Meaghan Harris MD [facility]       New Medications at discharge?:     Yes                      Medication Reconciliation by phone -     Each medication was reviewed (both pre and post hospitalization)  Yes    Were there discrepancies in medications? No  If YES, were discrepancies addressed? Not Applicable    Understands Medications?   Yes   Questions about medications from pt or caregiver? No   Questions addressed? Not Applicable                Has all of medication and/or prescriptions   Yes  Taking Medications? Yes  Can you swallow your pills? Yes    Is the patient having any trouble with ADL's or IADL's? No  Is the patient able to move around as expected? Yes  Needs Equipment?   No    Link to services in community?:  N/A   Which services:

## 2018-02-19 ENCOUNTER — OFFICE VISIT (OUTPATIENT)
Dept: PRIMARY CARE CLINIC | Age: 48
End: 2018-02-19
Payer: COMMERCIAL

## 2018-02-19 VITALS
SYSTOLIC BLOOD PRESSURE: 133 MMHG | BODY MASS INDEX: 28.69 KG/M2 | RESPIRATION RATE: 14 BRPM | OXYGEN SATURATION: 95 % | TEMPERATURE: 98 F | WEIGHT: 193.7 LBS | DIASTOLIC BLOOD PRESSURE: 87 MMHG | HEART RATE: 93 BPM | HEIGHT: 69 IN

## 2018-02-19 DIAGNOSIS — G43.909 MIGRAINE WITHOUT STATUS MIGRAINOSUS, NOT INTRACTABLE, UNSPECIFIED MIGRAINE TYPE: ICD-10-CM

## 2018-02-19 DIAGNOSIS — G43.511 INTRACTABLE PERSISTENT MIGRAINE AURA WITHOUT CEREBRAL INFARCTION AND WITH STATUS MIGRAINOSUS: Primary | ICD-10-CM

## 2018-02-19 PROCEDURE — 99214 OFFICE O/P EST MOD 30 MIN: CPT | Performed by: INTERNAL MEDICINE

## 2018-02-19 PROCEDURE — 4004F PT TOBACCO SCREEN RCVD TLK: CPT | Performed by: INTERNAL MEDICINE

## 2018-02-19 PROCEDURE — G8427 DOCREV CUR MEDS BY ELIG CLIN: HCPCS | Performed by: INTERNAL MEDICINE

## 2018-02-19 PROCEDURE — G8417 CALC BMI ABV UP PARAM F/U: HCPCS | Performed by: INTERNAL MEDICINE

## 2018-02-19 PROCEDURE — 96372 THER/PROPH/DIAG INJ SC/IM: CPT | Performed by: INTERNAL MEDICINE

## 2018-02-19 PROCEDURE — G8484 FLU IMMUNIZE NO ADMIN: HCPCS | Performed by: INTERNAL MEDICINE

## 2018-02-19 PROCEDURE — 1111F DSCHRG MED/CURRENT MED MERGE: CPT | Performed by: INTERNAL MEDICINE

## 2018-02-19 RX ORDER — RIZATRIPTAN BENZOATE 10 MG/1
10 TABLET ORAL
Qty: 9 TABLET | Refills: 5 | Status: SHIPPED | OUTPATIENT
Start: 2018-02-19 | End: 2018-02-19

## 2018-02-19 RX ORDER — OXYCODONE HYDROCHLORIDE AND ACETAMINOPHEN 5; 325 MG/1; MG/1
1 TABLET ORAL EVERY 6 HOURS PRN
Qty: 28 TABLET | Refills: 0 | Status: SHIPPED | OUTPATIENT
Start: 2018-02-19 | End: 2018-02-26

## 2018-02-19 RX ORDER — METHYLPREDNISOLONE ACETATE 80 MG/ML
80 INJECTION, SUSPENSION INTRA-ARTICULAR; INTRALESIONAL; INTRAMUSCULAR; SOFT TISSUE ONCE
Status: COMPLETED | OUTPATIENT
Start: 2018-02-19 | End: 2018-02-19

## 2018-02-19 RX ADMIN — METHYLPREDNISOLONE ACETATE 80 MG: 80 INJECTION, SUSPENSION INTRA-ARTICULAR; INTRALESIONAL; INTRAMUSCULAR; SOFT TISSUE at 14:38
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
## 2018-02-22 ASSESSMENT — ENCOUNTER SYMPTOMS
PHOTOPHOBIA: 0
VOICE CHANGE: 0
TROUBLE SWALLOWING: 0
SHORTNESS OF BREATH: 0
CHOKING: 0
ABDOMINAL PAIN: 0
VOMITING: 0
NAUSEA: 0

## 2018-02-23 LAB
ANION GAP SERPL CALCULATED.3IONS-SCNC: 14 MMOL/L (ref 10–20)
BICARBONATE: 26 MMOL/L (ref 21–32)
BUN / CREAT RATIO: 22 (ref 5–25)
CALCIUM SERPL-MCNC: 10 MG/DL (ref 8.6–10.3)
CHLORIDE BLD-SCNC: 99 MMOL/L (ref 98–107)
CREAT SERPL-MCNC: 1.22 MG/DL (ref 0.5–1.3)
ERYTHROCYTE [DISTWIDTH] IN BLOOD BY AUTOMATED COUNT: 12.9 % (ref 12–15.4)
ERYTHROCYTE [DISTWIDTH] IN BLOOD BY AUTOMATED COUNT: 39.9 FL (ref 39.3–48.6)
FINAL REPORT: NORMAL
GFR CALCULATED: >60
GLUCOSE: 110 MG/DL (ref 70–100)
HCT VFR BLD CALC: 47.9 % (ref 38.4–54.9)
HEMOGLOBIN: 16.3 G/DL (ref 12.8–17.7)
MCH RBC QN AUTO: 29.2 PG (ref 27.5–32.9)
MCHC RBC AUTO-ENTMCNC: 34 G/DL (ref 30.5–35.4)
MCV RBC AUTO: 85.8 FL (ref 83.3–98.2)
NUCLEATED RBCS: 0 /100{WBCS}
PLATELET # BLD: 308 10*3/UL (ref 155–404)
PMV BLD AUTO: 9.5 FL (ref 9.9–12.1)
POTASSIUM SERPL-SCNC: 3.8 MMOL/L (ref 3.5–5.1)
RBC: 5.58 10*6/UL (ref 4.08–6.37)
RBCS COUNTED: 0 10*3/UL
SODIUM BLD-SCNC: 135 MMOL/L (ref 136–145)
UREA NITROGEN: 27 MG/DL (ref 6–23)
WBC: 12.6 10*3/UL (ref 4.2–11)

## 2018-02-26 LAB — NON-GYN CYTOLOGY REPORT: NORMAL

## 2018-02-27 LAB
QUANTIFERON (R) TB GOLD (INCUBATED): NEGATIVE
QUANTIFERON MITOGEN MINUS NIL: >10 IU/ML
QUANTIFERON NIL: 0.04 IU/ML
QUANTIFERON TB AG MINUS NIL: 0 IU/ML (ref 0–0.34)

## 2018-03-01 ENCOUNTER — TELEPHONE (OUTPATIENT)
Dept: INFECTIOUS DISEASES | Age: 48
End: 2018-03-01

## 2018-03-02 ENCOUNTER — TELEPHONE (OUTPATIENT)
Dept: INFECTIOUS DISEASES | Age: 48
End: 2018-03-02

## 2018-03-05 LAB
A/G RATIO: 1.1 (ref 0.9–2.4)
ALBUMIN: 3.7 G/DL (ref 3.4–5)
ALP BLD-CCNC: 63 U/L (ref 45–117)
ALT SERPL-CCNC: 36 U/L (ref 10–52)
ANION GAP SERPL CALCULATED.3IONS-SCNC: 12 MMOL/L (ref 10–20)
AST SERPL-CCNC: 24 U/L (ref 13–39)
BICARBONATE: 30 MMOL/L (ref 21–32)
BILIRUB SERPL-MCNC: 0.5 MG/DL (ref 0–1.2)
BUN / CREAT RATIO: 14 (ref 5–25)
CALCIUM SERPL-MCNC: 9.9 MG/DL (ref 8.6–10.3)
CHLORIDE BLD-SCNC: 101 MMOL/L (ref 98–107)
CREAT SERPL-MCNC: 1.2 MG/DL (ref 0.5–1.3)
ERYTHROCYTE [DISTWIDTH] IN BLOOD BY AUTOMATED COUNT: 14.2 % (ref 12–15.4)
ERYTHROCYTE [DISTWIDTH] IN BLOOD BY AUTOMATED COUNT: 43 FL (ref 39.3–48.6)
GFR CALCULATED: >60
GLUCOSE: 95 MG/DL (ref 70–100)
HCT VFR BLD CALC: 48.6 % (ref 38.4–54.9)
HEMOGLOBIN: 16.1 G/DL (ref 12.8–17.7)
MCH RBC QN AUTO: 28.5 PG (ref 27.5–32.9)
MCHC RBC AUTO-ENTMCNC: 33.1 G/DL (ref 30.5–35.4)
MCV RBC AUTO: 86 FL (ref 83.3–98.2)
PLATELET # BLD: 250 10*3/UL (ref 155–404)
PMV BLD AUTO: 10.1 FL (ref 9.9–12.1)
POTASSIUM SERPL-SCNC: 4.2 MMOL/L (ref 3.5–5.1)
RBC: 5.65 10*6/UL (ref 4.08–6.37)
SODIUM BLD-SCNC: 139 MMOL/L (ref 136–145)
TOTAL PROTEIN: 7.1 G/DL (ref 6.4–8.2)
UREA NITROGEN: 17 MG/DL (ref 6–23)
WBC: 5.3 10*3/UL (ref 4.2–11)

## 2018-03-12 ENCOUNTER — OFFICE VISIT (OUTPATIENT)
Dept: INFECTIOUS DISEASES | Age: 48
End: 2018-03-12
Payer: COMMERCIAL

## 2018-03-12 VITALS
RESPIRATION RATE: 18 BRPM | WEIGHT: 188.6 LBS | DIASTOLIC BLOOD PRESSURE: 88 MMHG | HEART RATE: 98 BPM | HEIGHT: 69 IN | BODY MASS INDEX: 27.93 KG/M2 | SYSTOLIC BLOOD PRESSURE: 134 MMHG | TEMPERATURE: 98.1 F

## 2018-03-12 DIAGNOSIS — G04.90 ENCEPHALITIS: Primary | ICD-10-CM

## 2018-03-12 PROCEDURE — G8482 FLU IMMUNIZE ORDER/ADMIN: HCPCS | Performed by: INTERNAL MEDICINE

## 2018-03-12 PROCEDURE — 99213 OFFICE O/P EST LOW 20 MIN: CPT | Performed by: INTERNAL MEDICINE

## 2018-03-12 PROCEDURE — 1111F DSCHRG MED/CURRENT MED MERGE: CPT | Performed by: INTERNAL MEDICINE

## 2018-03-12 PROCEDURE — G8417 CALC BMI ABV UP PARAM F/U: HCPCS | Performed by: INTERNAL MEDICINE

## 2018-03-12 PROCEDURE — 4004F PT TOBACCO SCREEN RCVD TLK: CPT | Performed by: INTERNAL MEDICINE

## 2018-03-12 PROCEDURE — G8427 DOCREV CUR MEDS BY ELIG CLIN: HCPCS | Performed by: INTERNAL MEDICINE

## 2018-12-04 ENCOUNTER — HOSPITAL ENCOUNTER (OUTPATIENT)
Dept: GENERAL RADIOLOGY | Age: 48
Discharge: HOME | End: 2018-12-06
Payer: COMMERCIAL

## 2018-12-04 DIAGNOSIS — Z85.528 HISTORY OF KIDNEY CANCER: ICD-10-CM

## 2018-12-04 LAB — PROSTATE SPECIFIC ANTIGEN: 0.87 NG/ML

## 2018-12-04 PROCEDURE — 71046 X-RAY EXAM CHEST 2 VIEWS: CPT

## 2018-12-18 ENCOUNTER — OFFICE VISIT (OUTPATIENT)
Dept: UROLOGY | Age: 48
End: 2018-12-18
Payer: COMMERCIAL

## 2018-12-18 VITALS
HEIGHT: 69 IN | HEART RATE: 99 BPM | BODY MASS INDEX: 29.62 KG/M2 | WEIGHT: 200 LBS | SYSTOLIC BLOOD PRESSURE: 130 MMHG | DIASTOLIC BLOOD PRESSURE: 80 MMHG

## 2018-12-18 DIAGNOSIS — Z85.528 HISTORY OF KIDNEY CANCER: Primary | ICD-10-CM

## 2018-12-18 PROCEDURE — G8484 FLU IMMUNIZE NO ADMIN: HCPCS | Performed by: UROLOGY

## 2018-12-18 PROCEDURE — G8427 DOCREV CUR MEDS BY ELIG CLIN: HCPCS | Performed by: UROLOGY

## 2018-12-18 PROCEDURE — 4004F PT TOBACCO SCREEN RCVD TLK: CPT | Performed by: UROLOGY

## 2018-12-18 PROCEDURE — G8417 CALC BMI ABV UP PARAM F/U: HCPCS | Performed by: UROLOGY

## 2018-12-18 PROCEDURE — 99213 OFFICE O/P EST LOW 20 MIN: CPT | Performed by: UROLOGY

## 2018-12-18 ASSESSMENT — ENCOUNTER SYMPTOMS
ABDOMINAL DISTENTION: 0
SHORTNESS OF BREATH: 0
ABDOMINAL PAIN: 0

## 2019-05-24 ENCOUNTER — HOSPITAL ENCOUNTER (OUTPATIENT)
Dept: ORTHOPEDIC SURGERY | Age: 49
Discharge: HOME | End: 2019-05-26
Payer: COMMERCIAL

## 2019-05-24 DIAGNOSIS — M25.559 PAIN IN JOINT INVOLVING PELVIC REGION AND THIGH, UNSPECIFIED LATERALITY: ICD-10-CM

## 2019-05-24 PROCEDURE — 73502 X-RAY EXAM HIP UNI 2-3 VIEWS: CPT

## 2023-03-06 DIAGNOSIS — I10 HTN (HYPERTENSION), BENIGN: Primary | ICD-10-CM

## 2023-03-06 RX ORDER — ASPIRIN 81 MG/1
81 TABLET ORAL DAILY
COMMUNITY
End: 2023-03-06 | Stop reason: SDUPTHER

## 2023-03-06 RX ORDER — TELMISARTAN AND HYDROCHLORTHIAZIDE 80; 25 MG/1; MG/1
1 TABLET ORAL DAILY
Qty: 90 TABLET | Refills: 0 | Status: SHIPPED | OUTPATIENT
Start: 2023-03-06 | End: 2023-06-05 | Stop reason: SDUPTHER

## 2023-03-06 RX ORDER — ASPIRIN 81 MG/1
81 TABLET ORAL DAILY
Qty: 90 TABLET | Refills: 0 | Status: SHIPPED | OUTPATIENT
Start: 2023-03-06 | End: 2023-06-05 | Stop reason: SDUPTHER

## 2023-03-06 RX ORDER — TELMISARTAN AND HYDROCHLORTHIAZIDE 80; 25 MG/1; MG/1
1 TABLET ORAL DAILY
COMMUNITY
End: 2023-03-06 | Stop reason: SDUPTHER

## 2023-03-20 DIAGNOSIS — K21.9 GASTROESOPHAGEAL REFLUX DISEASE WITHOUT ESOPHAGITIS: Primary | ICD-10-CM

## 2023-03-20 RX ORDER — OMEPRAZOLE 40 MG/1
40 CAPSULE, DELAYED RELEASE ORAL DAILY
Qty: 1 CAPSULE | Refills: 0 | Status: SHIPPED | OUTPATIENT
Start: 2023-03-20 | End: 2023-03-22 | Stop reason: SDUPTHER

## 2023-03-20 RX ORDER — OMEPRAZOLE 40 MG/1
40 CAPSULE, DELAYED RELEASE ORAL DAILY
COMMUNITY
End: 2023-03-20 | Stop reason: SDUPTHER

## 2023-03-20 NOTE — TELEPHONE ENCOUNTER
Advise patient that his sleep study did reveal the presence of severe sleep apnea and starting the CPAP machine is recommended.  Test was able to determine the appropriate settings for the machine and we will proceed with ordering the machine if he is willing to use it.    Sleep apnea to the degree that he has and certainly have a significant impact on his heart and lungs over his lifetime.

## 2023-03-22 DIAGNOSIS — K21.9 GASTROESOPHAGEAL REFLUX DISEASE WITHOUT ESOPHAGITIS: ICD-10-CM

## 2023-03-22 RX ORDER — OMEPRAZOLE 40 MG/1
40 CAPSULE, DELAYED RELEASE ORAL DAILY
Qty: 90 CAPSULE | Refills: 0 | Status: SHIPPED | OUTPATIENT
Start: 2023-03-22 | End: 2023-06-21 | Stop reason: SDUPTHER

## 2023-03-24 DIAGNOSIS — G47.33 OBSTRUCTIVE SLEEP APNEA SYNDROME: Primary | ICD-10-CM

## 2023-03-24 PROBLEM — M47.22 OSTEOARTHRITIS OF SPINE WITH RADICULOPATHY, CERVICAL REGION: Status: ACTIVE | Noted: 2023-03-24

## 2023-03-24 PROBLEM — M54.2 NECK PAIN: Status: ACTIVE | Noted: 2023-03-24

## 2023-03-24 PROBLEM — E78.2 HYPERLIPEMIA, MIXED: Status: ACTIVE | Noted: 2023-03-24

## 2023-03-24 PROBLEM — D75.1 ERYTHROCYTOSIS: Status: ACTIVE | Noted: 2023-03-24

## 2023-03-24 PROBLEM — K21.9 GERD (GASTROESOPHAGEAL REFLUX DISEASE): Status: ACTIVE | Noted: 2023-03-24

## 2023-03-24 PROBLEM — G43.909 MIGRAINE HEADACHE: Status: ACTIVE | Noted: 2023-03-24

## 2023-03-24 PROBLEM — Z96.649 HISTORY OF TOTAL HIP REPLACEMENT: Status: ACTIVE | Noted: 2023-03-24

## 2023-03-24 PROBLEM — M10.9 GOUT: Status: ACTIVE | Noted: 2023-03-24

## 2023-03-24 PROBLEM — K64.4 SKIN TAG OF PERIANAL REGION: Status: ACTIVE | Noted: 2023-03-24

## 2023-03-24 PROBLEM — M75.111 NONTRAUMATIC INCOMPLETE TEAR OF RIGHT ROTATOR CUFF: Status: ACTIVE | Noted: 2023-03-24

## 2023-03-24 PROBLEM — J30.9 ALLERGIC RHINITIS: Status: ACTIVE | Noted: 2023-03-24

## 2023-03-24 PROBLEM — M16.9 OSTEOARTHRITIS OF HIP: Status: ACTIVE | Noted: 2023-03-24

## 2023-03-24 PROBLEM — M54.12 CERVICAL RADICULITIS: Status: ACTIVE | Noted: 2023-03-24

## 2023-03-24 PROBLEM — I10 HTN (HYPERTENSION): Status: ACTIVE | Noted: 2023-03-24

## 2023-03-24 PROBLEM — M25.559 HIP PAIN: Status: ACTIVE | Noted: 2023-03-24

## 2023-03-24 PROBLEM — K50.90 CROHN'S DISEASE (MULTI): Status: ACTIVE | Noted: 2023-03-24

## 2023-03-24 PROBLEM — N18.31 STAGE 3A CHRONIC KIDNEY DISEASE (MULTI): Status: ACTIVE | Noted: 2023-03-24

## 2023-03-24 PROBLEM — U07.1 COVID-19 VIRUS INFECTION: Status: RESOLVED | Noted: 2023-03-24 | Resolved: 2023-03-24

## 2023-03-24 PROBLEM — M75.100 ROTATOR CUFF TEAR: Status: ACTIVE | Noted: 2023-03-24

## 2023-03-24 PROBLEM — G47.30 SLEEP APNEA: Status: ACTIVE | Noted: 2023-03-01

## 2023-04-10 DIAGNOSIS — M1A.9XX0 CHRONIC GOUT WITHOUT TOPHUS, UNSPECIFIED CAUSE, UNSPECIFIED SITE: Primary | ICD-10-CM

## 2023-04-10 RX ORDER — FEBUXOSTAT 40 MG/1
1 TABLET, FILM COATED ORAL DAILY
COMMUNITY
Start: 2022-01-18 | End: 2023-04-10 | Stop reason: SDUPTHER

## 2023-04-10 RX ORDER — FEBUXOSTAT 40 MG/1
40 TABLET, FILM COATED ORAL DAILY
Qty: 90 TABLET | Refills: 0 | Status: SHIPPED | OUTPATIENT
Start: 2023-04-10 | End: 2023-07-10 | Stop reason: SDUPTHER

## 2023-04-10 RX ORDER — COLCHICINE 0.6 MG/1
0.6 TABLET ORAL DAILY
Qty: 90 TABLET | Refills: 0 | Status: SHIPPED | OUTPATIENT
Start: 2023-04-10 | End: 2023-07-10 | Stop reason: SDUPTHER

## 2023-04-10 RX ORDER — COLCHICINE 0.6 MG/1
TABLET ORAL
COMMUNITY
Start: 2020-07-27 | End: 2023-04-10 | Stop reason: SDUPTHER

## 2023-04-13 LAB
ALANINE AMINOTRANSFERASE (SGPT) (U/L) IN SER/PLAS: 61 U/L (ref 10–52)
ALBUMIN (G/DL) IN SER/PLAS: 4.1 G/DL (ref 3.4–5)
ALKALINE PHOSPHATASE (U/L) IN SER/PLAS: 54 U/L (ref 33–120)
ANION GAP IN SER/PLAS: 12 MMOL/L (ref 10–20)
ASPARTATE AMINOTRANSFERASE (SGOT) (U/L) IN SER/PLAS: 35 U/L (ref 9–39)
BASOPHILS (10*3/UL) IN BLOOD BY AUTOMATED COUNT: 0.08 X10E9/L (ref 0–0.1)
BASOPHILS/100 LEUKOCYTES IN BLOOD BY AUTOMATED COUNT: 0.8 % (ref 0–2)
BILIRUBIN TOTAL (MG/DL) IN SER/PLAS: 0.6 MG/DL (ref 0–1.2)
CALCIUM (MG/DL) IN SER/PLAS: 9.9 MG/DL (ref 8.6–10.3)
CARBON DIOXIDE, TOTAL (MMOL/L) IN SER/PLAS: 27 MMOL/L (ref 21–32)
CHLORIDE (MMOL/L) IN SER/PLAS: 104 MMOL/L (ref 98–107)
CHOLESTEROL (MG/DL) IN SER/PLAS: 158 MG/DL (ref 0–199)
CHOLESTEROL IN HDL (MG/DL) IN SER/PLAS: 36.2 MG/DL
CHOLESTEROL/HDL RATIO: 4.4
CREATININE (MG/DL) IN SER/PLAS: 1.49 MG/DL (ref 0.5–1.3)
EOSINOPHILS (10*3/UL) IN BLOOD BY AUTOMATED COUNT: 0.17 X10E9/L (ref 0–0.7)
EOSINOPHILS/100 LEUKOCYTES IN BLOOD BY AUTOMATED COUNT: 1.8 % (ref 0–6)
ERYTHROCYTE DISTRIBUTION WIDTH (RATIO) BY AUTOMATED COUNT: 13.7 % (ref 11.5–14.5)
ERYTHROCYTE MEAN CORPUSCULAR HEMOGLOBIN CONCENTRATION (G/DL) BY AUTOMATED: 32.6 G/DL (ref 32–36)
ERYTHROCYTE MEAN CORPUSCULAR VOLUME (FL) BY AUTOMATED COUNT: 87 FL (ref 80–100)
ERYTHROCYTES (10*6/UL) IN BLOOD BY AUTOMATED COUNT: 6.18 X10E12/L (ref 4.5–5.9)
GFR MALE: 56 ML/MIN/1.73M2
GLUCOSE (MG/DL) IN SER/PLAS: 94 MG/DL (ref 74–99)
HEMATOCRIT (%) IN BLOOD BY AUTOMATED COUNT: 54 % (ref 41–52)
HEMOGLOBIN (G/DL) IN BLOOD: 17.6 G/DL (ref 13.5–17.5)
IMMATURE GRANULOCYTES/100 LEUKOCYTES IN BLOOD BY AUTOMATED COUNT: 0.6 % (ref 0–0.9)
LDL: 95 MG/DL (ref 0–99)
LEUKOCYTES (10*3/UL) IN BLOOD BY AUTOMATED COUNT: 9.6 X10E9/L (ref 4.4–11.3)
LYMPHOCYTES (10*3/UL) IN BLOOD BY AUTOMATED COUNT: 3.88 X10E9/L (ref 1.2–4.8)
LYMPHOCYTES/100 LEUKOCYTES IN BLOOD BY AUTOMATED COUNT: 40.5 % (ref 13–44)
MONOCYTES (10*3/UL) IN BLOOD BY AUTOMATED COUNT: 0.9 X10E9/L (ref 0.1–1)
MONOCYTES/100 LEUKOCYTES IN BLOOD BY AUTOMATED COUNT: 9.4 % (ref 2–10)
NEUTROPHILS (10*3/UL) IN BLOOD BY AUTOMATED COUNT: 4.48 X10E9/L (ref 1.2–7.7)
NEUTROPHILS/100 LEUKOCYTES IN BLOOD BY AUTOMATED COUNT: 46.9 % (ref 40–80)
PLATELETS (10*3/UL) IN BLOOD AUTOMATED COUNT: 290 X10E9/L (ref 150–450)
POTASSIUM (MMOL/L) IN SER/PLAS: 4 MMOL/L (ref 3.5–5.3)
PROTEIN TOTAL: 7.8 G/DL (ref 6.4–8.2)
SODIUM (MMOL/L) IN SER/PLAS: 139 MMOL/L (ref 136–145)
TRIGLYCERIDE (MG/DL) IN SER/PLAS: 133 MG/DL (ref 0–149)
URATE (MG/DL) IN SER/PLAS: 5.5 MG/DL (ref 4–7.5)
UREA NITROGEN (MG/DL) IN SER/PLAS: 28 MG/DL (ref 6–23)
VLDL: 27 MG/DL (ref 0–40)

## 2023-04-24 DIAGNOSIS — G47.33 OBSTRUCTIVE SLEEP APNEA SYNDROME: ICD-10-CM

## 2023-04-24 DIAGNOSIS — G43.909 MIGRAINE WITHOUT STATUS MIGRAINOSUS, NOT INTRACTABLE, UNSPECIFIED MIGRAINE TYPE: Primary | ICD-10-CM

## 2023-04-24 RX ORDER — RIZATRIPTAN BENZOATE 10 MG/1
10 TABLET ORAL
COMMUNITY
End: 2023-04-24 | Stop reason: SDUPTHER

## 2023-04-24 NOTE — TELEPHONE ENCOUNTER
Patient called stated that his cpap machine is blowing to hard causing him to open his mouth wide and wake up, needs turned down he said to at least down to a 10 it is at a 16 right now. Also his nose thing is working good.

## 2023-04-25 RX ORDER — RIZATRIPTAN BENZOATE 10 MG/1
10 TABLET ORAL ONCE AS NEEDED
Qty: 9 TABLET | Refills: 0 | Status: SHIPPED | OUTPATIENT
Start: 2023-04-25 | End: 2023-05-02 | Stop reason: SINTOL

## 2023-04-25 NOTE — TELEPHONE ENCOUNTER
We need to have settings updated to an auto pap setting so that it will sense how much pressure he needs.  We will have him see one of the sleep specialists to get an opinion on how best to manage long term.    Please find out what company supplied his CPAP and we will try to get the settings changed.

## 2023-04-27 PROBLEM — R06.83 SNORING: Status: ACTIVE | Noted: 2023-04-27

## 2023-04-27 PROBLEM — J04.0 LARYNGITIS: Status: ACTIVE | Noted: 2023-04-27

## 2023-04-27 PROBLEM — R79.89 ELEVATED LFTS: Status: ACTIVE | Noted: 2023-04-27

## 2023-04-27 PROBLEM — H60.90 OTITIS EXTERNA: Status: ACTIVE | Noted: 2023-04-27

## 2023-04-27 PROBLEM — M25.519 SHOULDER PAIN: Status: ACTIVE | Noted: 2023-04-27

## 2023-04-27 PROBLEM — G47.19 EXCESSIVE DAYTIME SLEEPINESS: Status: ACTIVE | Noted: 2023-04-27

## 2023-04-27 PROBLEM — H66.90 OTITIS MEDIA, ACUTE: Status: ACTIVE | Noted: 2023-04-27

## 2023-04-27 PROBLEM — H90.6 MIXED HEARING LOSS, BILATERAL: Status: ACTIVE | Noted: 2023-04-27

## 2023-04-27 PROBLEM — R13.10 DYSPHAGIA: Status: ACTIVE | Noted: 2023-04-27

## 2023-04-27 RX ORDER — PREDNISONE 20 MG/1
20 TABLET ORAL DAILY
COMMUNITY
Start: 2023-04-07 | End: 2023-05-02 | Stop reason: WASHOUT

## 2023-04-27 RX ORDER — FLUTICASONE PROPIONATE 50 MCG
2 SPRAY, SUSPENSION (ML) NASAL DAILY
COMMUNITY
Start: 2020-07-02 | End: 2023-08-01 | Stop reason: SDUPTHER

## 2023-04-27 RX ORDER — CYCLOBENZAPRINE HCL 10 MG
1 TABLET ORAL NIGHTLY
COMMUNITY
Start: 2022-04-20 | End: 2023-05-02 | Stop reason: WASHOUT

## 2023-04-27 RX ORDER — OXYCODONE AND ACETAMINOPHEN 5; 325 MG/1; MG/1
1 TABLET ORAL EVERY 6 HOURS
COMMUNITY
Start: 2022-09-22 | End: 2023-05-02 | Stop reason: WASHOUT

## 2023-04-27 RX ORDER — CEFDINIR 300 MG/1
300 CAPSULE ORAL DAILY
COMMUNITY
Start: 2023-02-08 | End: 2023-08-01 | Stop reason: WASHOUT

## 2023-04-27 RX ORDER — MINERAL OIL
1 ENEMA (ML) RECTAL DAILY
COMMUNITY
End: 2024-06-05 | Stop reason: WASHOUT

## 2023-04-27 RX ORDER — ATORVASTATIN CALCIUM 20 MG/1
1 TABLET, FILM COATED ORAL DAILY
COMMUNITY
Start: 2020-12-30 | End: 2023-05-22 | Stop reason: SDUPTHER

## 2023-04-27 RX ORDER — CLINDAMYCIN HYDROCHLORIDE 300 MG/1
300 CAPSULE ORAL DAILY
COMMUNITY
Start: 2022-07-21 | End: 2023-08-01 | Stop reason: WASHOUT

## 2023-05-01 RX ORDER — CERTOLIZUMAB PEGOL 400 MG
200 KIT SUBCUTANEOUS
COMMUNITY
Start: 2018-06-26

## 2023-05-02 ENCOUNTER — OFFICE VISIT (OUTPATIENT)
Dept: PRIMARY CARE | Facility: CLINIC | Age: 53
End: 2023-05-02
Payer: COMMERCIAL

## 2023-05-02 VITALS
TEMPERATURE: 98 F | OXYGEN SATURATION: 95 % | SYSTOLIC BLOOD PRESSURE: 130 MMHG | WEIGHT: 223.8 LBS | DIASTOLIC BLOOD PRESSURE: 78 MMHG | HEART RATE: 97 BPM | HEIGHT: 69 IN | BODY MASS INDEX: 33.15 KG/M2

## 2023-05-02 DIAGNOSIS — I10 PRIMARY HYPERTENSION: Primary | ICD-10-CM

## 2023-05-02 DIAGNOSIS — K50.919 CROHN'S DISEASE WITH COMPLICATION, UNSPECIFIED GASTROINTESTINAL TRACT LOCATION (MULTI): ICD-10-CM

## 2023-05-02 DIAGNOSIS — R79.89 ELEVATED LFTS: ICD-10-CM

## 2023-05-02 DIAGNOSIS — E66.9 CLASS 1 OBESITY WITH SERIOUS COMORBIDITY AND BODY MASS INDEX (BMI) OF 33.0 TO 33.9 IN ADULT, UNSPECIFIED OBESITY TYPE: ICD-10-CM

## 2023-05-02 DIAGNOSIS — G43.909 MIGRAINE WITHOUT STATUS MIGRAINOSUS, NOT INTRACTABLE, UNSPECIFIED MIGRAINE TYPE: ICD-10-CM

## 2023-05-02 DIAGNOSIS — J30.9 ALLERGIC RHINITIS, UNSPECIFIED SEASONALITY, UNSPECIFIED TRIGGER: ICD-10-CM

## 2023-05-02 DIAGNOSIS — D75.1 ERYTHROCYTOSIS: ICD-10-CM

## 2023-05-02 DIAGNOSIS — M1A.00X0 IDIOPATHIC CHRONIC GOUT WITHOUT TOPHUS, UNSPECIFIED SITE: ICD-10-CM

## 2023-05-02 DIAGNOSIS — N18.31 STAGE 3A CHRONIC KIDNEY DISEASE (MULTI): ICD-10-CM

## 2023-05-02 DIAGNOSIS — E78.2 HYPERLIPEMIA, MIXED: ICD-10-CM

## 2023-05-02 DIAGNOSIS — G47.33 OBSTRUCTIVE SLEEP APNEA: ICD-10-CM

## 2023-05-02 DIAGNOSIS — K21.9 GASTROESOPHAGEAL REFLUX DISEASE WITHOUT ESOPHAGITIS: ICD-10-CM

## 2023-05-02 PROBLEM — J30.2 SEASONAL ALLERGIC RHINITIS: Status: ACTIVE | Noted: 2023-05-02

## 2023-05-02 PROBLEM — R51.9 HEADACHE: Status: ACTIVE | Noted: 2023-05-02

## 2023-05-02 PROBLEM — J32.0 MAXILLARY SINUSITIS: Status: ACTIVE | Noted: 2023-05-02

## 2023-05-02 PROBLEM — A46 ERYSIPELAS: Status: ACTIVE | Noted: 2023-05-02

## 2023-05-02 PROBLEM — N52.9 ED (ERECTILE DYSFUNCTION): Status: ACTIVE | Noted: 2023-05-02

## 2023-05-02 PROBLEM — T78.3XXA ANGIOEDEMA: Status: ACTIVE | Noted: 2023-05-02

## 2023-05-02 PROBLEM — K21.00 REFLUX ESOPHAGITIS: Status: ACTIVE | Noted: 2023-05-02

## 2023-05-02 PROBLEM — H66.90 OTITIS MEDIA: Status: ACTIVE | Noted: 2023-05-02

## 2023-05-02 PROBLEM — E66.811 CLASS 1 OBESITY WITH SERIOUS COMORBIDITY AND BODY MASS INDEX (BMI) OF 33.0 TO 33.9 IN ADULT: Status: ACTIVE | Noted: 2023-05-02

## 2023-05-02 PROBLEM — C64.9 RENAL CELL CARCINOMA (MULTI): Status: ACTIVE | Noted: 2023-05-02

## 2023-05-02 PROBLEM — K51.90 ULCERATIVE COLITIS (MULTI): Status: ACTIVE | Noted: 2023-05-02

## 2023-05-02 PROBLEM — G45.9 TRANSIENT CEREBRAL ISCHEMIA: Status: ACTIVE | Noted: 2018-02-10

## 2023-05-02 PROCEDURE — 3008F BODY MASS INDEX DOCD: CPT | Performed by: FAMILY MEDICINE

## 2023-05-02 PROCEDURE — 99214 OFFICE O/P EST MOD 30 MIN: CPT | Performed by: FAMILY MEDICINE

## 2023-05-02 PROCEDURE — 3078F DIAST BP <80 MM HG: CPT | Performed by: FAMILY MEDICINE

## 2023-05-02 PROCEDURE — 1036F TOBACCO NON-USER: CPT | Performed by: FAMILY MEDICINE

## 2023-05-02 PROCEDURE — 3075F SYST BP GE 130 - 139MM HG: CPT | Performed by: FAMILY MEDICINE

## 2023-05-02 RX ORDER — RIZATRIPTAN BENZOATE 10 MG/1
10 TABLET, ORALLY DISINTEGRATING ORAL ONCE AS NEEDED
Qty: 30 TABLET | Refills: 0 | Status: SHIPPED | OUTPATIENT
Start: 2023-05-02 | End: 2023-05-22 | Stop reason: SDUPTHER

## 2023-05-02 RX ORDER — ACETAMINOPHEN, ASPIRIN (NSAID), AND CAFFEINE 250; 250; 65 MG/1; MG/1; MG/1
1 TABLET, FILM COATED ORAL EVERY 6 HOURS PRN
COMMUNITY

## 2023-05-02 RX ORDER — RIZATRIPTAN BENZOATE 10 MG/1
10 TABLET ORAL ONCE AS NEEDED
Qty: 9 TABLET | Refills: 0 | Status: CANCELLED | OUTPATIENT
Start: 2023-05-02

## 2023-05-02 RX ORDER — TELMISARTAN 80 MG/1
40 TABLET ORAL DAILY
COMMUNITY
End: 2023-05-02 | Stop reason: WASHOUT

## 2023-05-02 ASSESSMENT — PATIENT HEALTH QUESTIONNAIRE - PHQ9
2. FEELING DOWN, DEPRESSED OR HOPELESS: NOT AT ALL
1. LITTLE INTEREST OR PLEASURE IN DOING THINGS: NOT AT ALL
SUM OF ALL RESPONSES TO PHQ9 QUESTIONS 1 AND 2: 0

## 2023-05-02 NOTE — PROGRESS NOTES
Subjective   Patient ID: Jamil Cisse is a 53 y.o. male who presents for 3 month follow up for monitoring and management of multiple medical conditions.    HPI     Wants to go over sleep study  Lower pressure on c pap  Wants to discuss allergies/interested in allergist referral        Patient was recently diagnosed with severe sleep apnea and CPAP therapy was to be started.  He has found a pressure setting of 16 cm of H2O seems to be too much for him to tolerate.  Blood pressure tends to force his mouth open if using the nasal CPAP and a full facemask has to be tightened to a point where it causes his sore to develop on his nose but otherwise the mask for week.  We decided to change him to AutoPap and refer him to sleep medicine for additional treatment options.  AutoPap has already been ordered and the sleep medicine appointment has not yet been arranged.          Patient has hypertension.  He does not monitor BP at home.   Denies CP, SOB, dizziness, and LE edema.   Patient is compliant with his antihypertensive therapy and denies any noted side effects.     He has hyperlipidemia.  Patient has been compliant with his statin therapy and denies any noted side effects.  Pt does try to reduce the amount of cholesterol and fatty foods he consumes.     Pt has GERD.  GERD is stable with the current dose of omeprazole.   He denies any breakthrough reflux symptoms.     Patient has allergic rhinitis  5/2/2023:  His allergies have been worse even with a combination of Zyrtec and fluticasone nasal spray.  HE WOULD LIKE A REFERRAL TO AN ALLERGIST FOR FURTHER EVALUATION.     Pt has a hx of gout.   He takes Colchicine prn.   He denies any flare-ups since his last visit here.     He has a history of osteoarthritis of both hips.  He underwent a right hip replacement in the past.   He had a left hip replacement 6/8/2020.     Pt has Crohn's disease.  He sees GI ( Dr. Richard) on a regular basis.   He receives Cimzia injections  "TWICE a month of Cimzia.  He currently denies any abdominal pain, nausea, vomiting, diarrhea, melena, or hematochezia.     Patient has migraine headaches.   He takes Maxalt as needed for his migraines.  Patient has been unable to take the generic version of Maxalt (rizatriptan) as it gives him dizziness, upset stomach, nausea, and lethargy.   AS A RESULT OF HIS INTOLERANCE TO GENERIC MAXALT, WE WERE ABLE TO GET THE BRAND NAME MEDICATION APPROVED FOR HIM.   HE HAS TRIED THE GENERIC VERSION ON SEVERAL OCCASION AND IT CAUSESDIZZINESS, UPSET STOMACH, NAUSEA, AND LETHARGY.   He tolerates the brand-name medication without noted side effects.     Review of Systems    Constitutional: Patient denies any fever, chills, loss of appetite, or unexplained weight loss.  Cardiovascular: Patient denies any chest pain, shortness of breath with exertion, tachycardia, palpitations, orthopnea, or paroxysmal nocturnal dyspnea.  Respiratory: Patient denies any cough, shortness breath, or wheezing.  Gastrointestinal: Patient denies any nausea, vomiting, diarrhea, constipation, melena, hematochezia, or reflux symptoms.  Skin: Denies any rashes or skin lesions.   Neurology: Patient denies any new motor or sensory losses.  Denies any numbness, tingling, weakness, and incoordination of the extremities.  Patient also denies any tremor, seizures, or gait instability.  Endocrinology: Denies any polyuria, polydipsia, polyphagia, or heat/cold intolerance.    SEE HISTORY OF PRESENT ILLNESS ALSO    Objective   /78   Pulse 97   Temp 36.7 °C (98 °F)   Ht 1.753 m (5' 9\")   Wt 102 kg (223 lb 12.8 oz)   SpO2 95%   BMI 33.05 kg/m²     Physical Exam    General Appearance: Alert and cooperative, in no acute distress, well-developed/well-nourished.  Neck: Supple and without adenopathy or rigidity.  There is no JVD at 90° and no carotid bruits are noted.  There is no thyromegaly, thyroid tenderness, or palpable thyroid nodules.  Heart: Regular rate " and rhythm without murmur or ectopy.  Respiratory: Lungs are clear to auscultation bilaterally with good air exchange.  Good respiratory effort and no accessory muscle use.  Skin: Good turgor, moist, warm and without rashes or lesions.  Neurological exam: Alert and oriented ×3, no tremor, normal gait.  Extremities: No clubbing, cyanosis, or edema      Assessment/Plan     HTN: Blood pressure appears adequately controlled and we will continue with the current antihypertensive therapy.    HLD: Stable based on recent labs.  Risk factor reduction for CAD was discussed at length.  He has tolerated the statin therapy without side effects.    GERD: Stable based on symptoms.  He will continue the current dosing of omeprazole and appropriate dietary changes.    Migraine headaches: Stable on the current treatment plan.  Patient will continue to use the Maxalt as needed. The brand-name Maxalt has continued to work well.  HE CAN NOT TOLERATE THE GENERIC FORM AS HE EXPERIENCES SIDE EFFECTS OF DIZZINESS, UPSET STOMACH, NAUSEA, AND LETHARGY.     Allergic rhinitis:   5/2/2023: Allergy symptoms have been worse recently.  Patient continue with the current treatment plan and we will refer him to an allergist for further evaluation.    Crohn's Disease: Stable per patient.  We will continue the current medication and continue to follow with his GI specialist.    Gout: Stable.  We will continue to monitor.    Stage 3a CKD: Stable based on labs.  He was encouraged to avoid NSAIDs.    Erythrocytosis: Labs revealed consistently elevated red blood cell count but has remained stable.  We will continue to monitor.    Elevated LFTs:   7/13/2022 ultrasound of the liver revealed hepatomegaly and steatosis.  11/21/2020 labs for evaluation of elevated LFTs were essentially unremarkable.    Obstructive sleep apnea:  He was started on CPAP but could not tolerate the pressure at 16 cm H2O.   Was changed to Auto pap and referred to sleep medicine for  evaluation and suggestions on best course of action.    Obesity: Dietary changes, exercise, and maintenance of a healthy weight were discussed at length.

## 2023-05-02 NOTE — PATIENT INSTRUCTIONS
Follow up in 3 months.    It was a pleasure to see you today. Thank you for choosing us for your health care needs.    If you have lab or other testing completed and have not been informed of results within one week, please call the office for your results.    If you haven't done so, consider signing up for [facility], the [facility] personal health record. Ask the staff how you can get started.

## 2023-05-11 ENCOUNTER — DOCUMENTATION (OUTPATIENT)
Dept: PRIMARY CARE | Facility: CLINIC | Age: 53
End: 2023-05-11
Payer: COMMERCIAL

## 2023-05-22 DIAGNOSIS — G43.909 MIGRAINE WITHOUT STATUS MIGRAINOSUS, NOT INTRACTABLE, UNSPECIFIED MIGRAINE TYPE: ICD-10-CM

## 2023-05-22 DIAGNOSIS — E78.2 HYPERLIPEMIA, MIXED: Primary | ICD-10-CM

## 2023-05-22 RX ORDER — RIZATRIPTAN BENZOATE 10 MG/1
10 TABLET, ORALLY DISINTEGRATING ORAL ONCE AS NEEDED
Qty: 30 TABLET | Refills: 0 | Status: SHIPPED | OUTPATIENT
Start: 2023-05-22 | End: 2024-04-24

## 2023-05-22 RX ORDER — ATORVASTATIN CALCIUM 20 MG/1
20 TABLET, FILM COATED ORAL DAILY
Qty: 90 TABLET | Refills: 0 | Status: SHIPPED | OUTPATIENT
Start: 2023-05-22 | End: 2023-08-28

## 2023-06-05 DIAGNOSIS — I10 HTN (HYPERTENSION), BENIGN: ICD-10-CM

## 2023-06-05 RX ORDER — TELMISARTAN AND HYDROCHLORTHIAZIDE 80; 25 MG/1; MG/1
1 TABLET ORAL DAILY
Qty: 90 TABLET | Refills: 0 | Status: SHIPPED | OUTPATIENT
Start: 2023-06-05 | End: 2023-09-05 | Stop reason: SDUPTHER

## 2023-06-05 RX ORDER — ASPIRIN 81 MG/1
81 TABLET ORAL DAILY
Qty: 90 TABLET | Refills: 0 | Status: SHIPPED | OUTPATIENT
Start: 2023-06-05 | End: 2023-08-28 | Stop reason: SDUPTHER

## 2023-06-14 ENCOUNTER — TELEPHONE (OUTPATIENT)
Dept: PRIMARY CARE | Facility: CLINIC | Age: 53
End: 2023-06-14
Payer: COMMERCIAL

## 2023-06-14 NOTE — TELEPHONE ENCOUNTER
Patient states that he needs a pressure change form sent to [email or web address] due to the pressure on his CPAP machine being too high. He says they never received a form. He would like it faxed to [phone] so that he can change the setting on his machine. Please advise

## 2023-06-15 NOTE — TELEPHONE ENCOUNTER
Advise pt that I verified the fax was originally sent 5/1/2023.  I refaxed the paperwork for him today, 6/14/2023.  Please have him let us know if they do not receive the order.

## 2023-06-17 LAB
ALLERGEN ANIMAL: CAT DANDER IGE (KU/L): <0.1 KU/L
ALLERGEN ANIMAL: DOG DANDER IGE (KU/L): <0.1 KU/L
ALLERGEN GRASS: BERMUDA GRASS (CYNODON DACTYLON) IGE (KU/L): <0.1 KU/L
ALLERGEN GRASS: JOHNSON GRASS (SORGHUM HALEPENSE) IGE (KU/L): <0.1 KU/L
ALLERGEN GRASS: MEADOW GRASS, KENTUCKY BLUE (POA PRATENSIS )IGE (KU/L): <0.1 KU/L
ALLERGEN GRASS: TIMOTHY GRASS (PHLEUM PRATENSE) IGE (KU/L): <0.1 KU/L
ALLERGEN INSECT: COCKROACH IGE: 0.24 KU/L
ALLERGEN MICROORGANISM: ALTERNARIA ALTERNATA IGE (KU/L): 0.51 KU/L
ALLERGEN MICROORGANISM: ASPERGILLUS FUMIGATUS IGE (KU/L): <0.1 KU/L
ALLERGEN MICROORGANISM: CLADOSPORIUM HERBARUM IGE (KU/L): <0.1 KU/L
ALLERGEN MICROORGANISM: PENICILLIUM CHRYSOGENUM (P. NOTATUM) IGE (KU/L): <0.1 KU/L
ALLERGEN MITE: DERMATOPHAGOIDES FARINAE (HOUSE DUST MITE) IGE (KU/L): <0.1 KU/L
ALLERGEN MITE: DERMATOPHAGOIDES PTERONYSSINUS (HOUSE DUST MITE) IGE (KU/L): <0.1 KU/L
ALLERGEN TREE: BOX-ELDER (ACER NEGUNDO) IGE (KU/L): <0.1 KU/L
ALLERGEN TREE: COMMON SILVER BIRCH (BETULA VERRUCOSA) IGE (KU/L): <0.1 KU/L
ALLERGEN TREE: COTTONWOOD (POPULUS DELTOIDES) IGE (KU/L): <0.1 KU/L
ALLERGEN TREE: ELM (ULMUS AMERICANA) IGE (KU/L): <0.1 KU/L
ALLERGEN TREE: MAPLE LEAF SYCAMORE, LONDON PLANE IGE (KU/L): <0.1 KU/L
ALLERGEN TREE: MOUNTAIN JUNIPER (JUNIPERUS SABINOIDES) IGE (KU/L): <0.1 KU/L
ALLERGEN TREE: MULBERRY (MORUS ALBA) IGE (KU/L): <0.1 KU/L
ALLERGEN TREE: OAK (QUERCUS ALBA) IGE (KU/L): <0.1 KU/L
ALLERGEN TREE: PECAN, HICKORY (CARYA PECAN) IGE (KU/L): <0.1 KU/L
ALLERGEN TREE: WALNUT IGE: <0.1 KU/L
ALLERGEN TREE: WHITE ASH (FRAXINUS AMERICANA) IGE (KU/L): <0.1 KU/L
ALLERGEN WEED: COMMON PIGWEED (AMARANTHUS RETROFLEXUS) IGE (KU/L): <0.1 KU/L
ALLERGEN WEED: COMMON RAGWEED (AMB. ARTEMISIIFOLIA/A. ELATIOR) IGE (KU/L): <0.1 KU/L
ALLERGEN WEED: GOOSEFOOT, LAMB'S QUARTERS (CHENOPODIUM ALBUM) IGE (KU/L): <0.1 KU/L
ALLERGEN WEED: PLANTAIN (ENGLISH), RIBWORT (PLANTAGO LANCEOLATA) IGE (KU/L): <0.1 KU/L
ALLERGEN WEED: PRICKLY SALTWORT/RUSSIAN THISTLE (SALSOLA KALI) IGE (KU/L): <0.1 KU/L
ALLERGEN WEED: SHEEP SORREL (RUMEX ACETOSELLA) IGE (KU/L): <0.1 KU/L
IMMUNOCAP IGE: 25.8 KU/L (ref 0–214)
IMMUNOCAP INTERPRETATION: ABNORMAL

## 2023-07-10 DIAGNOSIS — M1A.9XX0 CHRONIC GOUT WITHOUT TOPHUS, UNSPECIFIED CAUSE, UNSPECIFIED SITE: ICD-10-CM

## 2023-07-10 RX ORDER — COLCHICINE 0.6 MG/1
0.6 TABLET ORAL DAILY
Qty: 90 TABLET | Refills: 0 | Status: SHIPPED | OUTPATIENT
Start: 2023-07-10 | End: 2023-10-09 | Stop reason: SDUPTHER

## 2023-07-10 RX ORDER — FEBUXOSTAT 40 MG/1
40 TABLET, FILM COATED ORAL DAILY
Qty: 90 TABLET | Refills: 0 | Status: SHIPPED | OUTPATIENT
Start: 2023-07-10 | End: 2023-10-09 | Stop reason: SDUPTHER

## 2023-07-24 LAB
ALANINE AMINOTRANSFERASE (SGPT) (U/L) IN SER/PLAS: 58 U/L (ref 10–52)
ALBUMIN (G/DL) IN SER/PLAS: 4.1 G/DL (ref 3.4–5)
ALKALINE PHOSPHATASE (U/L) IN SER/PLAS: 62 U/L (ref 33–120)
ANION GAP IN SER/PLAS: 12 MMOL/L (ref 10–20)
ASPARTATE AMINOTRANSFERASE (SGOT) (U/L) IN SER/PLAS: 39 U/L (ref 9–39)
BILIRUBIN TOTAL (MG/DL) IN SER/PLAS: 0.5 MG/DL (ref 0–1.2)
C REACTIVE PROTEIN (MG/L) IN SER/PLAS: 0.39 MG/DL
CALCIUM (MG/DL) IN SER/PLAS: 9.7 MG/DL (ref 8.6–10.3)
CARBON DIOXIDE, TOTAL (MMOL/L) IN SER/PLAS: 26 MMOL/L (ref 21–32)
CHLORIDE (MMOL/L) IN SER/PLAS: 104 MMOL/L (ref 98–107)
CREATININE (MG/DL) IN SER/PLAS: 1.5 MG/DL (ref 0.5–1.3)
ERYTHROCYTE DISTRIBUTION WIDTH (RATIO) BY AUTOMATED COUNT: 14.5 % (ref 11.5–14.5)
ERYTHROCYTE MEAN CORPUSCULAR HEMOGLOBIN CONCENTRATION (G/DL) BY AUTOMATED: 31.6 G/DL (ref 32–36)
ERYTHROCYTE MEAN CORPUSCULAR VOLUME (FL) BY AUTOMATED COUNT: 82 FL (ref 80–100)
ERYTHROCYTES (10*6/UL) IN BLOOD BY AUTOMATED COUNT: 5.92 X10E12/L (ref 4.5–5.9)
GFR MALE: 55 ML/MIN/1.73M2
GLUCOSE (MG/DL) IN SER/PLAS: 106 MG/DL (ref 74–99)
HEMATOCRIT (%) IN BLOOD BY AUTOMATED COUNT: 48.8 % (ref 41–52)
HEMOGLOBIN (G/DL) IN BLOOD: 15.4 G/DL (ref 13.5–17.5)
LEUKOCYTES (10*3/UL) IN BLOOD BY AUTOMATED COUNT: 7.5 X10E9/L (ref 4.4–11.3)
PLATELETS (10*3/UL) IN BLOOD AUTOMATED COUNT: 258 X10E9/L (ref 150–450)
POTASSIUM (MMOL/L) IN SER/PLAS: 3.9 MMOL/L (ref 3.5–5.3)
PROTEIN TOTAL: 7.6 G/DL (ref 6.4–8.2)
SEDIMENTATION RATE, ERYTHROCYTE: 7 MM/H (ref 0–20)
SODIUM (MMOL/L) IN SER/PLAS: 138 MMOL/L (ref 136–145)
UREA NITROGEN (MG/DL) IN SER/PLAS: 24 MG/DL (ref 6–23)

## 2023-07-31 LAB — CALPROTECTIN, STOOL: 104 UG/G

## 2023-08-01 ENCOUNTER — OFFICE VISIT (OUTPATIENT)
Dept: PRIMARY CARE | Facility: CLINIC | Age: 53
End: 2023-08-01
Payer: COMMERCIAL

## 2023-08-01 VITALS
WEIGHT: 228.6 LBS | HEIGHT: 69 IN | BODY MASS INDEX: 33.86 KG/M2 | DIASTOLIC BLOOD PRESSURE: 84 MMHG | TEMPERATURE: 98.4 F | HEART RATE: 104 BPM | OXYGEN SATURATION: 94 % | SYSTOLIC BLOOD PRESSURE: 134 MMHG

## 2023-08-01 DIAGNOSIS — G43.009 MIGRAINE WITHOUT AURA AND WITHOUT STATUS MIGRAINOSUS, NOT INTRACTABLE: ICD-10-CM

## 2023-08-01 DIAGNOSIS — K76.0 FATTY LIVER: ICD-10-CM

## 2023-08-01 DIAGNOSIS — J30.9 ALLERGIC RHINITIS, UNSPECIFIED SEASONALITY, UNSPECIFIED TRIGGER: ICD-10-CM

## 2023-08-01 DIAGNOSIS — Z12.5 PROSTATE CANCER SCREENING: ICD-10-CM

## 2023-08-01 DIAGNOSIS — N18.31 STAGE 3A CHRONIC KIDNEY DISEASE (MULTI): ICD-10-CM

## 2023-08-01 DIAGNOSIS — E66.9 CLASS 1 OBESITY WITH SERIOUS COMORBIDITY AND BODY MASS INDEX (BMI) OF 33.0 TO 33.9 IN ADULT, UNSPECIFIED OBESITY TYPE: ICD-10-CM

## 2023-08-01 DIAGNOSIS — G47.33 OBSTRUCTIVE SLEEP APNEA: ICD-10-CM

## 2023-08-01 DIAGNOSIS — M1A.00X0 IDIOPATHIC CHRONIC GOUT WITHOUT TOPHUS, UNSPECIFIED SITE: ICD-10-CM

## 2023-08-01 DIAGNOSIS — D75.1 ERYTHROCYTOSIS: ICD-10-CM

## 2023-08-01 DIAGNOSIS — Z23 NEED FOR SHINGLES VACCINE: ICD-10-CM

## 2023-08-01 DIAGNOSIS — K50.919 CROHN'S DISEASE WITH COMPLICATION, UNSPECIFIED GASTROINTESTINAL TRACT LOCATION (MULTI): ICD-10-CM

## 2023-08-01 DIAGNOSIS — R74.01 ELEVATION OF LEVELS OF LIVER TRANSAMINASE LEVELS: ICD-10-CM

## 2023-08-01 DIAGNOSIS — K21.9 GASTROESOPHAGEAL REFLUX DISEASE WITHOUT ESOPHAGITIS: ICD-10-CM

## 2023-08-01 DIAGNOSIS — I10 PRIMARY HYPERTENSION: Primary | ICD-10-CM

## 2023-08-01 DIAGNOSIS — E78.2 HYPERLIPEMIA, MIXED: ICD-10-CM

## 2023-08-01 PROBLEM — H10.10 ALLERGIC CONJUNCTIVITIS: Status: ACTIVE | Noted: 2023-08-01

## 2023-08-01 PROBLEM — M75.101 NONTRAUMATIC RUPTURE OF MUSCLE OR TENDON OF ROTATOR CUFF OF RIGHT SHOULDER: Status: ACTIVE | Noted: 2023-08-01

## 2023-08-01 PROBLEM — K62.9 DISORDER OF ANUS: Status: ACTIVE | Noted: 2023-08-01

## 2023-08-01 PROBLEM — R09.81 NASAL CONGESTION: Status: ACTIVE | Noted: 2023-08-01

## 2023-08-01 PROBLEM — R01.1 HEART MURMUR: Status: ACTIVE | Noted: 2023-08-01

## 2023-08-01 PROBLEM — M10.9 GOUTY ARTHRITIS OF TOE: Status: ACTIVE | Noted: 2022-09-23

## 2023-08-01 PROBLEM — E83.52 HYPERCALCEMIA: Status: ACTIVE | Noted: 2023-08-01

## 2023-08-01 PROBLEM — R53.83 FATIGUE: Status: ACTIVE | Noted: 2023-08-01

## 2023-08-01 PROBLEM — Z86.61 HISTORY OF ENCEPHALITIS: Status: ACTIVE | Noted: 2023-08-01

## 2023-08-01 PROBLEM — H72.90 TYMPANIC MEMBRANE PERFORATION: Status: ACTIVE | Noted: 2023-08-01

## 2023-08-01 PROBLEM — R68.83 CHILLS: Status: ACTIVE | Noted: 2023-08-01

## 2023-08-01 PROBLEM — C64.9 RENAL CELL CARCINOMA (MULTI): Status: RESOLVED | Noted: 2023-05-02 | Resolved: 2023-08-01

## 2023-08-01 PROBLEM — Z86.79 HISTORY OF HYPERTENSION: Status: ACTIVE | Noted: 2023-08-01

## 2023-08-01 PROBLEM — R06.02 MILD SHORTNESS OF BREATH: Status: ACTIVE | Noted: 2023-08-01

## 2023-08-01 PROCEDURE — 3079F DIAST BP 80-89 MM HG: CPT | Performed by: FAMILY MEDICINE

## 2023-08-01 PROCEDURE — 90471 IMMUNIZATION ADMIN: CPT | Performed by: FAMILY MEDICINE

## 2023-08-01 PROCEDURE — 90750 HZV VACC RECOMBINANT IM: CPT | Performed by: FAMILY MEDICINE

## 2023-08-01 PROCEDURE — 1036F TOBACCO NON-USER: CPT | Performed by: FAMILY MEDICINE

## 2023-08-01 PROCEDURE — 3075F SYST BP GE 130 - 139MM HG: CPT | Performed by: FAMILY MEDICINE

## 2023-08-01 PROCEDURE — 99214 OFFICE O/P EST MOD 30 MIN: CPT | Performed by: FAMILY MEDICINE

## 2023-08-01 PROCEDURE — 3008F BODY MASS INDEX DOCD: CPT | Performed by: FAMILY MEDICINE

## 2023-08-01 RX ORDER — CETIRIZINE HYDROCHLORIDE 10 MG/1
TABLET ORAL
COMMUNITY

## 2023-08-01 RX ORDER — LIFITEGRAST 50 MG/ML
SOLUTION/ DROPS OPHTHALMIC
COMMUNITY

## 2023-08-01 RX ORDER — CEFUROXIME AXETIL 250 MG/1
250 TABLET ORAL EVERY 12 HOURS
COMMUNITY
Start: 2019-03-27 | End: 2023-08-01 | Stop reason: WASHOUT

## 2023-08-01 RX ORDER — METHYLPREDNISOLONE 4 MG/1
TABLET ORAL
COMMUNITY
Start: 2022-01-04 | End: 2023-08-01 | Stop reason: WASHOUT

## 2023-08-01 RX ORDER — MONTELUKAST SODIUM 10 MG/1
1 TABLET ORAL NIGHTLY
COMMUNITY
Start: 2023-06-16 | End: 2024-03-19 | Stop reason: SDUPTHER

## 2023-08-01 RX ORDER — FLUTICASONE PROPIONATE 50 MCG
2 SPRAY, SUSPENSION (ML) NASAL DAILY
Qty: 48 G | Refills: 0 | Status: SHIPPED | OUTPATIENT
Start: 2023-08-01 | End: 2023-11-02 | Stop reason: SDUPTHER

## 2023-08-01 RX ORDER — TELMISARTAN 80 MG/1
TABLET ORAL
COMMUNITY
End: 2023-08-01 | Stop reason: WASHOUT

## 2023-08-01 ASSESSMENT — PATIENT HEALTH QUESTIONNAIRE - PHQ9
1. LITTLE INTEREST OR PLEASURE IN DOING THINGS: NOT AT ALL
SUM OF ALL RESPONSES TO PHQ9 QUESTIONS 1 AND 2: 0
2. FEELING DOWN, DEPRESSED OR HOPELESS: NOT AT ALL

## 2023-08-01 NOTE — PROGRESS NOTES
"\"Subjective   Patient ID: Jamil Cisse is a 53 y.o. male who presents for Follow-up.    HPI     No new concerns  No labs     Patient has hypertension.  He does not monitor BP at home.   Denies CP, SOB, dizziness, and LE edema.   Patient is compliant with his antihypertensive therapy and denies any noted side effects.     He has hyperlipidemia.  Patient has been compliant with his statin therapy and denies any noted side effects.  Pt does try to reduce the amount of cholesterol and fatty foods he consumes.     Pt has GERD.  GERD is stable with the current dose of omeprazole.   He denies any breakthrough reflux symptoms.     Patient has allergic rhinitis  5/2/2023:  His allergies have been worse even with a combination of Zyrtec and fluticasone nasal spray.  HE WOULD LIKE A REFERRAL TO AN ALLERGIST FOR FURTHER EVALUATION.     Pt has a hx of gout.   He takes Colchicine prn.   He denies any flare-ups since his last visit here.     He has a history of osteoarthritis of both hips.  He underwent a right hip replacement in the past.   He had a left hip replacement 6/8/2020.     Pt has Crohn's disease.  He sees GI ( Dr. Richard) on a regular basis.   He receives Cimzia injections TWICE a month of Cimzia.  He currently denies any abdominal pain, nausea, vomiting, diarrhea, melena, or hematochezia.     Patient has migraine headaches.   He takes Maxalt as needed for his migraines.  Patient has been unable to take the generic version of Maxalt (rizatriptan) as it gives him dizziness, upset stomach, nausea, and lethargy.   AS A RESULT OF HIS INTOLERANCE TO GENERIC MAXALT, WE WERE ABLE TO GET THE BRAND NAME MEDICATION APPROVED FOR HIM.   HE HAS TRIED THE GENERIC VERSION ON SEVERAL OCCASIONS AND IT CAUSES DIZZINESS, UPSET STOMACH, NAUSEA, AND LETHARGY.   He tolerates the brand-name medication without noted side effects.          Review of Systems    Constitutional: Patient denies any fever, chills, loss of appetite, or " "unexplained weight loss.  Cardiovascular: Patient denies any chest pain, shortness of breath with exertion, tachycardia, palpitations, orthopnea, or paroxysmal nocturnal dyspnea.  Respiratory: Patient denies any cough, shortness breath, or wheezing.  Gastrointestinal: Patient denies any nausea, vomiting, diarrhea, constipation, melena, hematochezia, or reflux symptoms.  Skin: Denies any rashes or skin lesions.   Neurology: Patient denies any new motor or sensory losses.  Denies any numbness, tingling, weakness, and incoordination of the extremities.  Patient also denies any tremor, seizures, or gait instability.  Endocrinology: Denies any polyuria, polydipsia, polyphagia, or heat/cold intolerance.    SEE HISTORY OF PRESENT ILLNESS ALSO    Objective   /84   Pulse 104   Temp 36.9 °C (98.4 °F)   Ht 1.753 m (5' 9\")   Wt 104 kg (228 lb 9.6 oz)   SpO2 94%   BMI 33.76 kg/m²     Physical Exam    General Appearance: Alert and cooperative, in no acute distress, well-developed/well-nourished obese male.    Neck: Supple and without adenopathy or rigidity.  There is no JVD at 90° and no carotid bruits are noted.  There is no thyromegaly, thyroid tenderness, or palpable thyroid nodules.  Heart: Regular rate and rhythm without murmur or ectopy.  Respiratory: Lungs are clear to auscultation bilaterally with good air exchange.  Good respiratory effort and no accessory muscle use.  Skin: Good turgor, moist, warm and without rashes or lesions.  Neurological exam: Alert and oriented ×3, no tremor, normal gait.  Extremities: No clubbing, cyanosis, or edema    Assessment/Plan     HTN: Blood pressure appears adequately controlled and we will continue with the current antihypertensive therapy.     HLD: Stable based on recent labs.  Risk factor reduction for CAD was discussed at length.  He has tolerated the statin therapy without side effects.     GERD: Stable based on symptoms.  He will continue the current dosing of " omeprazole and appropriate dietary changes.     Migraine headaches: Stable on the current treatment plan.  Patient will continue to use the Maxalt as needed. The brand-name Maxalt has continued to work well.  HE CAN NOT TOLERATE THE GENERIC FORM AS HE EXPERIENCES SIDE EFFECTS OF DIZZINESS, UPSET STOMACH, NAUSEA, AND LETHARGY.      Allergic rhinitis:   5/2/2023: Allergy symptoms have been worse recently.  Patient continue with the current treatment plan and we will refer him to an allergist for further evaluation.     Crohn's Disease: Stable per patient.  We will continue the current medication and continue to follow with his GI specialist.     Gout: Stable.  We will continue to monitor.     Stage 3a CKD: Stable based on labs.  He was encouraged to avoid NSAIDs.     Erythrocytosis: Labs revealed consistently elevated red blood cell count but has remained stable.  We will continue to monitor.     Elevated LFTs / Fatty liver:  LFTs mildly elevated.  7/13/2022 ultrasound of the liver revealed hepatomegaly and steatosis.  11/21/2020 labs for evaluation of elevated LFTs were essentially unremarkable.     Obstructive sleep apnea:  He was started on CPAP but could not tolerate the pressure at 16 cm H2O.   Was changed to Auto pap and referred to sleep medicine for evaluation and suggestions on best course of action.     Obesity: Dietary changes, exercise, and maintenance of a healthy weight were discussed at length.    Vaccinations: Shingrix first dose given today, 8/1/2023.        COLONOSCOPY DUE 8/2024  PSA DUE 4/19/2023    By signing my name below, I, Hima Quickibe, attest that this documentation has been prepared under the direction and in the presence of Dr. Flores.  All medical record entries made by the Scribe were at my direction and personally dictated by me. I have reviewed the chart and agree that the record accurately reflects my personal performance of the history, physical exam, discussion and  plan. (Dr. Flores).

## 2023-08-01 NOTE — PATIENT INSTRUCTIONS
First shingles vaccine given today.    Continue the current medications. Follow up in 3 months.    It was a pleasure to see you today. Thank you for choosing us for your health care needs.    If you have lab or other testing completed and have not been informed of results within one week, please call the office for your results.    If you haven't done so, consider signing up for [employer], the [facility] personal health record. Ask the staff how you can get started.

## 2023-08-28 DIAGNOSIS — I10 HTN (HYPERTENSION), BENIGN: ICD-10-CM

## 2023-08-28 DIAGNOSIS — E78.2 HYPERLIPEMIA, MIXED: ICD-10-CM

## 2023-08-28 PROBLEM — H66.90 OTITIS MEDIA, ACUTE: Status: RESOLVED | Noted: 2023-04-27 | Resolved: 2023-08-28

## 2023-08-28 PROBLEM — R13.10 DYSPHAGIA: Status: RESOLVED | Noted: 2023-04-27 | Resolved: 2023-08-28

## 2023-08-28 PROBLEM — M54.2 NECK PAIN: Status: RESOLVED | Noted: 2023-03-24 | Resolved: 2023-08-28

## 2023-08-28 PROBLEM — A46 ERYSIPELAS: Status: RESOLVED | Noted: 2023-05-02 | Resolved: 2023-08-28

## 2023-08-28 PROBLEM — Z86.79 HISTORY OF HYPERTENSION: Status: RESOLVED | Noted: 2023-08-01 | Resolved: 2023-08-28

## 2023-08-28 PROBLEM — R68.83 CHILLS: Status: RESOLVED | Noted: 2023-08-01 | Resolved: 2023-08-28

## 2023-08-28 PROBLEM — R53.83 FATIGUE: Status: RESOLVED | Noted: 2023-08-01 | Resolved: 2023-08-28

## 2023-08-28 PROBLEM — K76.0 FATTY LIVER: Status: ACTIVE | Noted: 2023-08-28

## 2023-08-28 PROBLEM — R09.81 NASAL CONGESTION: Status: RESOLVED | Noted: 2023-08-01 | Resolved: 2023-08-28

## 2023-08-28 PROBLEM — R51.9 HEADACHE: Status: RESOLVED | Noted: 2023-05-02 | Resolved: 2023-08-28

## 2023-08-28 PROBLEM — H66.90 OTITIS MEDIA: Status: RESOLVED | Noted: 2023-05-02 | Resolved: 2023-08-28

## 2023-08-28 PROBLEM — J04.0 LARYNGITIS: Status: RESOLVED | Noted: 2023-04-27 | Resolved: 2023-08-28

## 2023-08-28 PROBLEM — R06.02 MILD SHORTNESS OF BREATH: Status: RESOLVED | Noted: 2023-08-01 | Resolved: 2023-08-28

## 2023-08-28 PROBLEM — H60.90 OTITIS EXTERNA: Status: RESOLVED | Noted: 2023-04-27 | Resolved: 2023-08-28

## 2023-08-28 RX ORDER — ATORVASTATIN CALCIUM 20 MG/1
20 TABLET, FILM COATED ORAL DAILY
Qty: 90 TABLET | Refills: 0 | Status: SHIPPED | OUTPATIENT
Start: 2023-08-28 | End: 2023-08-28 | Stop reason: SDUPTHER

## 2023-08-29 RX ORDER — TELMISARTAN AND HYDROCHLORTHIAZIDE 80; 25 MG/1; MG/1
1 TABLET ORAL DAILY
Qty: 90 TABLET | Refills: 0 | OUTPATIENT
Start: 2023-08-29

## 2023-08-29 RX ORDER — ASPIRIN 81 MG/1
81 TABLET ORAL DAILY
Qty: 90 TABLET | Refills: 0 | Status: SHIPPED | OUTPATIENT
Start: 2023-08-29 | End: 2023-12-04 | Stop reason: SDUPTHER

## 2023-08-29 RX ORDER — ATORVASTATIN CALCIUM 20 MG/1
20 TABLET, FILM COATED ORAL DAILY
Qty: 90 TABLET | Refills: 0 | Status: SHIPPED | OUTPATIENT
Start: 2023-08-29 | End: 2023-12-04 | Stop reason: SDUPTHER

## 2023-09-05 DIAGNOSIS — I10 HTN (HYPERTENSION), BENIGN: ICD-10-CM

## 2023-09-06 RX ORDER — TELMISARTAN AND HYDROCHLORTHIAZIDE 80; 25 MG/1; MG/1
1 TABLET ORAL DAILY
Qty: 90 TABLET | Refills: 0 | Status: SHIPPED | OUTPATIENT
Start: 2023-09-06 | End: 2023-12-04 | Stop reason: SDUPTHER

## 2023-09-25 DIAGNOSIS — K21.9 GASTROESOPHAGEAL REFLUX DISEASE WITHOUT ESOPHAGITIS: ICD-10-CM

## 2023-09-25 RX ORDER — OMEPRAZOLE 40 MG/1
40 CAPSULE, DELAYED RELEASE ORAL DAILY
Qty: 90 CAPSULE | Refills: 0 | Status: SHIPPED | OUTPATIENT
Start: 2023-09-25 | End: 2023-12-18 | Stop reason: SDUPTHER

## 2023-10-02 ENCOUNTER — OFFICE VISIT (OUTPATIENT)
Dept: PRIMARY CARE | Facility: CLINIC | Age: 53
End: 2023-10-02
Payer: COMMERCIAL

## 2023-10-02 VITALS
WEIGHT: 230 LBS | SYSTOLIC BLOOD PRESSURE: 132 MMHG | RESPIRATION RATE: 16 BRPM | OXYGEN SATURATION: 94 % | HEIGHT: 69 IN | TEMPERATURE: 98.6 F | DIASTOLIC BLOOD PRESSURE: 82 MMHG | HEART RATE: 113 BPM | BODY MASS INDEX: 34.07 KG/M2

## 2023-10-02 DIAGNOSIS — H66.90 ACUTE OTITIS MEDIA, UNSPECIFIED OTITIS MEDIA TYPE: Primary | ICD-10-CM

## 2023-10-02 DIAGNOSIS — J30.9 ALLERGIC RHINITIS, UNSPECIFIED SEASONALITY, UNSPECIFIED TRIGGER: ICD-10-CM

## 2023-10-02 PROCEDURE — 1036F TOBACCO NON-USER: CPT | Performed by: PHYSICIAN ASSISTANT

## 2023-10-02 PROCEDURE — 3008F BODY MASS INDEX DOCD: CPT | Performed by: PHYSICIAN ASSISTANT

## 2023-10-02 PROCEDURE — 3079F DIAST BP 80-89 MM HG: CPT | Performed by: PHYSICIAN ASSISTANT

## 2023-10-02 PROCEDURE — 3075F SYST BP GE 130 - 139MM HG: CPT | Performed by: PHYSICIAN ASSISTANT

## 2023-10-02 PROCEDURE — 99213 OFFICE O/P EST LOW 20 MIN: CPT | Performed by: PHYSICIAN ASSISTANT

## 2023-10-02 RX ORDER — CEFDINIR 300 MG/1
300 CAPSULE ORAL 2 TIMES DAILY
Qty: 20 CAPSULE | Refills: 0 | Status: SHIPPED | OUTPATIENT
Start: 2023-10-02 | End: 2023-10-12

## 2023-10-02 ASSESSMENT — PATIENT HEALTH QUESTIONNAIRE - PHQ9
2. FEELING DOWN, DEPRESSED OR HOPELESS: NOT AT ALL
SUM OF ALL RESPONSES TO PHQ9 QUESTIONS 1 AND 2: 0
1. LITTLE INTEREST OR PLEASURE IN DOING THINGS: NOT AT ALL

## 2023-10-02 NOTE — PROGRESS NOTES
"Subjective   Patient ID: Jamil Cisse is a 53 y.o. male who presents for Earache (Left ear.).    HPI   Pt is c/o L ear pain  Susceptible to ear aches in the past  No discharge, just pain, pressure, fullness.  Pt was put ABX last time and it worked.   He feels like fluid inside  Hearing worse than usual, ear pops.  +PND, + allergies      Review of Systems  Constitutional: Patient denies any fever, chills, loss of appetite, or unexplained weight loss.  Cardiovascular: Denies any chest pain, shortness of breath with exertion, tachycardia, palpitations.  Respiratory: Patient denies any cough, shortness of breath, or wheezing.  Skin:  Denies any rashes or skin lesions.      Objective   /82 (BP Location: Right arm, Patient Position: Sitting, BP Cuff Size: Adult)   Pulse (!) 113   Temp 37 °C (98.6 °F) (Temporal)   Resp 16   Ht 1.753 m (5' 9\")   Wt 104 kg (230 lb)   SpO2 94%   BMI 33.97 kg/m²     Physical Exam  General Appearance: Alert and cooperative, no acute distress, well-developed/well-nourished.  EENT:  Mucous membranes are moist, external auditory canals WNL; L TM with erythema, bulging, and serous effusion; R TM with scarring but otherwise NL;  pharynx is unremarkable.  There is thick yellow nasal drainage noted bilaterally.  Neck: Supple and without adenopathy or rigidity.  Heart: Regular rate and rhythm without ectopy.  Lungs clear to auscultation bilaterally with good air exchange.    Assessment/Plan   Diagnoses and all orders for this visit:  Acute otitis media, unspecified otitis media type  -     cefdinir (Omnicef) 300 mg capsule; Take 1 capsule (300 mg) by mouth 2 times a day for 10 days.  Patient will start on Omnicef twice daily for 10 days.  He will continue with his probiotics over-the-counter.  He will also add in Sudafed once daily to help with decongesting.     Seasonal allergic rhinitis-patient will continue with his antihistamine and Flonase as directed.    He is to return to clinic " in 7 to 10 days if symptoms or not improved or sooner should they worsen.    Patient understands that should they have testing outside   facilities that we may not receive the results and was told to call us if they have not heard from our office within a week after testing.    [facility] uses voice recognition technology for dictations. Sometimes the software misinterprets words. Please take this into account when reading this.

## 2023-10-03 ENCOUNTER — CLINICAL SUPPORT (OUTPATIENT)
Dept: ALLERGY | Facility: CLINIC | Age: 53
End: 2023-10-03
Payer: COMMERCIAL

## 2023-10-03 DIAGNOSIS — J30.9 ALLERGIC RHINITIS, UNSPECIFIED SEASONALITY, UNSPECIFIED TRIGGER: ICD-10-CM

## 2023-10-03 PROCEDURE — 95117 IMMUNOTHERAPY INJECTIONS: CPT | Performed by: ALLERGY & IMMUNOLOGY

## 2023-10-09 DIAGNOSIS — M1A.9XX0 CHRONIC GOUT WITHOUT TOPHUS, UNSPECIFIED CAUSE, UNSPECIFIED SITE: ICD-10-CM

## 2023-10-10 ENCOUNTER — APPOINTMENT (OUTPATIENT)
Dept: ALLERGY | Facility: CLINIC | Age: 53
End: 2023-10-10
Payer: COMMERCIAL

## 2023-10-11 RX ORDER — FEBUXOSTAT 40 MG/1
40 TABLET, FILM COATED ORAL DAILY
Qty: 90 TABLET | Refills: 0 | Status: SHIPPED | OUTPATIENT
Start: 2023-10-11 | End: 2024-01-08 | Stop reason: SDUPTHER

## 2023-10-11 RX ORDER — COLCHICINE 0.6 MG/1
0.6 TABLET ORAL DAILY
Qty: 90 TABLET | Refills: 0 | Status: SHIPPED | OUTPATIENT
Start: 2023-10-11 | End: 2024-01-08 | Stop reason: SDUPTHER

## 2023-10-13 ENCOUNTER — LAB (OUTPATIENT)
Dept: LAB | Facility: LAB | Age: 53
End: 2023-10-13
Payer: COMMERCIAL

## 2023-10-13 DIAGNOSIS — K50.10 CROHN'S DISEASE OF LARGE INTESTINE WITHOUT COMPLICATIONS (MULTI): Primary | ICD-10-CM

## 2023-10-13 DIAGNOSIS — I10 PRIMARY HYPERTENSION: ICD-10-CM

## 2023-10-13 DIAGNOSIS — E78.2 HYPERLIPEMIA, MIXED: ICD-10-CM

## 2023-10-13 DIAGNOSIS — M1A.00X0 IDIOPATHIC CHRONIC GOUT WITHOUT TOPHUS, UNSPECIFIED SITE: ICD-10-CM

## 2023-10-13 DIAGNOSIS — Z12.5 PROSTATE CANCER SCREENING: ICD-10-CM

## 2023-10-13 LAB
ALBUMIN SERPL BCP-MCNC: 4.1 G/DL (ref 3.4–5)
ALP SERPL-CCNC: 63 U/L (ref 33–120)
ALT SERPL W P-5'-P-CCNC: 66 U/L (ref 10–52)
ANION GAP SERPL CALC-SCNC: 11 MMOL/L (ref 10–20)
AST SERPL W P-5'-P-CCNC: 48 U/L (ref 9–39)
BILIRUB SERPL-MCNC: 0.6 MG/DL (ref 0–1.2)
BUN SERPL-MCNC: 26 MG/DL (ref 6–23)
CALCIUM SERPL-MCNC: 9.7 MG/DL (ref 8.6–10.3)
CHLORIDE SERPL-SCNC: 100 MMOL/L (ref 98–107)
CHOLEST SERPL-MCNC: 132 MG/DL (ref 0–199)
CHOLESTEROL/HDL RATIO: 4.4
CO2 SERPL-SCNC: 30 MMOL/L (ref 21–32)
CREAT SERPL-MCNC: 1.74 MG/DL (ref 0.5–1.3)
GFR SERPL CREATININE-BSD FRML MDRD: 46 ML/MIN/1.73M*2
GLUCOSE SERPL-MCNC: 99 MG/DL (ref 74–99)
HDLC SERPL-MCNC: 30.3 MG/DL
LDLC SERPL CALC-MCNC: 79 MG/DL
NON HDL CHOLESTEROL: 102 MG/DL (ref 0–149)
POTASSIUM SERPL-SCNC: 4.2 MMOL/L (ref 3.5–5.3)
PROT SERPL-MCNC: 7.8 G/DL (ref 6.4–8.2)
PSA SERPL-MCNC: 0.9 NG/ML
SODIUM SERPL-SCNC: 137 MMOL/L (ref 136–145)
TRIGL SERPL-MCNC: 113 MG/DL (ref 0–149)
URATE SERPL-MCNC: 5.3 MG/DL (ref 4–7.5)
VLDL: 23 MG/DL (ref 0–40)

## 2023-10-13 PROCEDURE — 86481 TB AG RESPONSE T-CELL SUSP: CPT

## 2023-10-13 PROCEDURE — 84550 ASSAY OF BLOOD/URIC ACID: CPT

## 2023-10-13 PROCEDURE — 36415 COLL VENOUS BLD VENIPUNCTURE: CPT

## 2023-10-13 PROCEDURE — 83993 ASSAY FOR CALPROTECTIN FECAL: CPT

## 2023-10-13 PROCEDURE — 80061 LIPID PANEL: CPT

## 2023-10-13 PROCEDURE — 80053 COMPREHEN METABOLIC PANEL: CPT

## 2023-10-13 PROCEDURE — 84153 ASSAY OF PSA TOTAL: CPT

## 2023-10-15 LAB
NIL(NEG) CONTROL SPOT COUNT: NORMAL
PANEL A SPOT COUNT: 0
PANEL B SPOT COUNT: 0
POS CONTROL SPOT COUNT: NORMAL
T-SPOT. TB INTERPRETATION: NEGATIVE

## 2023-10-17 LAB — CALPROTECTIN STL-MCNT: 647 UG/G

## 2023-10-24 ENCOUNTER — CLINICAL SUPPORT (OUTPATIENT)
Dept: ALLERGY | Facility: CLINIC | Age: 53
End: 2023-10-24
Payer: COMMERCIAL

## 2023-10-24 DIAGNOSIS — J30.9 ALLERGIC RHINITIS, UNSPECIFIED SEASONALITY, UNSPECIFIED TRIGGER: ICD-10-CM

## 2023-10-24 PROCEDURE — 95117 IMMUNOTHERAPY INJECTIONS: CPT | Performed by: ALLERGY & IMMUNOLOGY

## 2023-10-31 ENCOUNTER — CLINICAL SUPPORT (OUTPATIENT)
Dept: ALLERGY | Facility: CLINIC | Age: 53
End: 2023-10-31
Payer: COMMERCIAL

## 2023-10-31 DIAGNOSIS — J30.9 ALLERGIC RHINITIS, UNSPECIFIED SEASONALITY, UNSPECIFIED TRIGGER: ICD-10-CM

## 2023-10-31 PROCEDURE — 95117 IMMUNOTHERAPY INJECTIONS: CPT | Performed by: ALLERGY & IMMUNOLOGY

## 2023-11-02 DIAGNOSIS — J30.9 ALLERGIC RHINITIS, UNSPECIFIED SEASONALITY, UNSPECIFIED TRIGGER: ICD-10-CM

## 2023-11-02 RX ORDER — FLUTICASONE PROPIONATE 50 MCG
2 SPRAY, SUSPENSION (ML) NASAL DAILY
Qty: 48 G | Refills: 0 | Status: SHIPPED | OUTPATIENT
Start: 2023-11-02 | End: 2024-02-01 | Stop reason: SDUPTHER

## 2023-11-07 ENCOUNTER — CLINICAL SUPPORT (OUTPATIENT)
Dept: ALLERGY | Facility: CLINIC | Age: 53
End: 2023-11-07
Payer: COMMERCIAL

## 2023-11-07 DIAGNOSIS — J30.9 ALLERGIC RHINITIS, UNSPECIFIED SEASONALITY, UNSPECIFIED TRIGGER: ICD-10-CM

## 2023-11-07 PROCEDURE — 95117 IMMUNOTHERAPY INJECTIONS: CPT | Performed by: ALLERGY & IMMUNOLOGY

## 2023-11-14 ENCOUNTER — CLINICAL SUPPORT (OUTPATIENT)
Dept: ALLERGY | Facility: CLINIC | Age: 53
End: 2023-11-14
Payer: COMMERCIAL

## 2023-11-14 DIAGNOSIS — J30.9 ALLERGIC RHINITIS, UNSPECIFIED SEASONALITY, UNSPECIFIED TRIGGER: ICD-10-CM

## 2023-11-14 PROCEDURE — 95117 IMMUNOTHERAPY INJECTIONS: CPT | Performed by: ALLERGY & IMMUNOLOGY

## 2023-11-16 ENCOUNTER — OFFICE VISIT (OUTPATIENT)
Dept: ORTHOPEDIC SURGERY | Facility: CLINIC | Age: 53
End: 2023-11-16
Payer: COMMERCIAL

## 2023-11-16 VITALS — BODY MASS INDEX: 32.58 KG/M2 | HEIGHT: 69 IN | WEIGHT: 220 LBS

## 2023-11-16 DIAGNOSIS — Z98.890 HISTORY OF REPAIR OF RIGHT ROTATOR CUFF: Primary | ICD-10-CM

## 2023-11-16 PROCEDURE — 1036F TOBACCO NON-USER: CPT | Performed by: ORTHOPAEDIC SURGERY

## 2023-11-16 PROCEDURE — 3008F BODY MASS INDEX DOCD: CPT | Performed by: ORTHOPAEDIC SURGERY

## 2023-11-16 PROCEDURE — 99213 OFFICE O/P EST LOW 20 MIN: CPT | Performed by: ORTHOPAEDIC SURGERY

## 2023-11-16 ASSESSMENT — PAIN SCALES - GENERAL: PAINLEVEL_OUTOF10: 0 - NO PAIN

## 2023-11-16 ASSESSMENT — PAIN - FUNCTIONAL ASSESSMENT: PAIN_FUNCTIONAL_ASSESSMENT: 0-10

## 2023-11-16 NOTE — PROGRESS NOTES
History of present illness: 1 year out rotator cuff repair right shoulder doing great    He is return to weightlifting heavy push pull lifting he just needs to be careful not to do overhead impingement or impacting activities    Physical exam:    General: No acute distress or breathing difficulty or discomfort, pleasant and cooperative with the examination.    Extremities: Today on exam right shoulder was inspected    He has full motion he can flex up to 170 abduct to 60 external rotate to 50 he can internally rotate to T10 great motion good push pull    Motor intact C5-T1 no instability no night pain no drop arm sign no weakness good strength in internal/external rotation rotator cuff strength function he has no crepitus incisions are all clean healed dry and intact he is moving elbow hand and wrist without complication    Diagnostic studies: Right shoulder imaging is not required    Impression: 1 year out rotator cuff repair doing extremely well    Plan: Long discussion about low Tatar cuff longevity we talked about avoiding [place] head impinging activities we talked about controlling weightlifting program with hands below the shoulder we talked about strength conditioning program but not overloading the repair we will see him back in our office as needed

## 2023-11-20 ENCOUNTER — APPOINTMENT (OUTPATIENT)
Dept: PRIMARY CARE | Facility: CLINIC | Age: 53
End: 2023-11-20
Payer: COMMERCIAL

## 2023-11-20 ENCOUNTER — OFFICE VISIT (OUTPATIENT)
Dept: PRIMARY CARE | Facility: CLINIC | Age: 53
End: 2023-11-20
Payer: COMMERCIAL

## 2023-11-20 VITALS
OXYGEN SATURATION: 94 % | DIASTOLIC BLOOD PRESSURE: 89 MMHG | TEMPERATURE: 98.3 F | HEIGHT: 69 IN | WEIGHT: 224.1 LBS | HEART RATE: 95 BPM | SYSTOLIC BLOOD PRESSURE: 116 MMHG | BODY MASS INDEX: 33.19 KG/M2

## 2023-11-20 DIAGNOSIS — N18.31 STAGE 3A CHRONIC KIDNEY DISEASE (MULTI): ICD-10-CM

## 2023-11-20 DIAGNOSIS — H60.62 CHRONIC OTITIS EXTERNA OF LEFT EAR, UNSPECIFIED TYPE: ICD-10-CM

## 2023-11-20 DIAGNOSIS — I10 PRIMARY HYPERTENSION: Primary | ICD-10-CM

## 2023-11-20 DIAGNOSIS — G43.009 MIGRAINE WITHOUT AURA AND WITHOUT STATUS MIGRAINOSUS, NOT INTRACTABLE: ICD-10-CM

## 2023-11-20 DIAGNOSIS — Z23 NEED FOR SHINGLES VACCINE: ICD-10-CM

## 2023-11-20 DIAGNOSIS — K50.919 CROHN'S DISEASE WITH COMPLICATION, UNSPECIFIED GASTROINTESTINAL TRACT LOCATION (MULTI): ICD-10-CM

## 2023-11-20 DIAGNOSIS — E78.2 HYPERLIPEMIA, MIXED: ICD-10-CM

## 2023-11-20 DIAGNOSIS — D75.1 ERYTHROCYTOSIS: ICD-10-CM

## 2023-11-20 DIAGNOSIS — Z23 ENCOUNTER FOR IMMUNIZATION: ICD-10-CM

## 2023-11-20 DIAGNOSIS — K21.9 GASTROESOPHAGEAL REFLUX DISEASE WITHOUT ESOPHAGITIS: ICD-10-CM

## 2023-11-20 DIAGNOSIS — R06.02 SOB (SHORTNESS OF BREATH): ICD-10-CM

## 2023-11-20 DIAGNOSIS — G47.33 OBSTRUCTIVE SLEEP APNEA: ICD-10-CM

## 2023-11-20 DIAGNOSIS — M1A.00X0 IDIOPATHIC CHRONIC GOUT WITHOUT TOPHUS, UNSPECIFIED SITE: ICD-10-CM

## 2023-11-20 PROCEDURE — 90471 IMMUNIZATION ADMIN: CPT | Performed by: FAMILY MEDICINE

## 2023-11-20 PROCEDURE — 90686 IIV4 VACC NO PRSV 0.5 ML IM: CPT | Performed by: FAMILY MEDICINE

## 2023-11-20 PROCEDURE — 3079F DIAST BP 80-89 MM HG: CPT | Performed by: FAMILY MEDICINE

## 2023-11-20 PROCEDURE — 3074F SYST BP LT 130 MM HG: CPT | Performed by: FAMILY MEDICINE

## 2023-11-20 PROCEDURE — 90472 IMMUNIZATION ADMIN EACH ADD: CPT | Performed by: FAMILY MEDICINE

## 2023-11-20 PROCEDURE — 3008F BODY MASS INDEX DOCD: CPT | Performed by: FAMILY MEDICINE

## 2023-11-20 PROCEDURE — 1036F TOBACCO NON-USER: CPT | Performed by: FAMILY MEDICINE

## 2023-11-20 PROCEDURE — 99214 OFFICE O/P EST MOD 30 MIN: CPT | Performed by: FAMILY MEDICINE

## 2023-11-20 PROCEDURE — 90750 HZV VACC RECOMBINANT IM: CPT | Performed by: FAMILY MEDICINE

## 2023-11-20 RX ORDER — CIPROFLOXACIN AND DEXAMETHASONE 3; 1 MG/ML; MG/ML
4 SUSPENSION/ DROPS AURICULAR (OTIC) 2 TIMES DAILY
Qty: 7.5 ML | Refills: 0 | Status: SHIPPED | OUTPATIENT
Start: 2023-11-20 | End: 2023-11-27

## 2023-11-20 NOTE — PROGRESS NOTES
Subjective   Patient ID: Jamil Cisse is a 53 y.o. male who presents for Follow-up.    HPI     11/20/2023:  Patient stated he would like his second shingles shot and would like to discuss his flu shot    He also states his LT ear infection came back and he has been experiencing discharge and pain and would like to get back on antibiotics for it.      He would like to discuss his lungs as he has noted some changes in his breathing.  Has some SOB ever since he had Covid.  He reports that he did have some PFTs completed with his allergist.  Does not experience any chest pain with these symptoms.    Patient states he thinks he is developing plantar fascitis in his LT foot. He tried to stretch it out but he states it is severely painful.      Patient has hypertension.  He does not monitor BP at home.   Denies CP, SOB, dizziness, and LE edema.   Patient is compliant with his antihypertensive therapy and denies any noted side effects.     He has hyperlipidemia.  Patient has been compliant with his statin therapy and denies any noted side effects.  Pt does try to reduce the amount of cholesterol and fatty foods he consumes.     Pt has GERD.  GERD is stable with the current dose of omeprazole.   He denies any breakthrough reflux symptoms.     Patient has allergic rhinitis  His allergies are well controlled with a combination of Zyrtec and fluticasone nasal spray.     Pt has a hx of gout.   He takes Colchicine prn.   He denies any flare-ups since his last visit here.     He has a history of osteoarthritis of both hips.  He underwent a right hip replacement in the past.   He had a left hip replacement 6/8/2020.     Pt has Crohn's disease.  He sees GI ( Dr. Richard) on a regular basis.   He receives Cimzia injections TWICE a month of Cimzia.  He currently denies any abdominal pain, nausea, vomiting, diarrhea, melena, or hematochezia.     Patient has migraine headaches.   He takes Maxalt as needed for his migraines.  Patient  "has been unable to take the generic version of Maxalt (rizatriptan) as it gives him dizziness, upset stomach, nausea, and lethargy.   He tolerates the brand-name medication without noted side effects.         Review of Systems  Constitutional: Patient denies any fever, chills, loss of appetite, or unexplained weight loss.  Cardiovascular: Patient denies any chest pain, shortness of breath with exertion, tachycardia, palpitations, orthopnea, or paroxysmal nocturnal dyspnea.    Respiratory: Patient denies any cough or wheezing.  Has had some feelings of shortness of breath after having COVID.    Gastrointestinal: Patient denies any nausea, vomiting, diarrhea, constipation, melena, hematochezia, or reflux symptoms.  Skin: Denies any rashes or skin lesions.   Neurology: Patient denies any new motor or sensory losses.  Denies any numbness, tingling, weakness, and incoordination of the extremities.  Patient also denies any tremor, seizures, or gait instability.  Endocrinology: Denies any polyuria, polydipsia, polyphagia, or heat/cold intolerance.    SEE HPI ALSO    Objective   /89   Pulse 95   Temp 36.8 °C (98.3 °F)   Ht 1.753 m (5' 9\")   Wt 102 kg (224 lb 1.6 oz)   SpO2 94%   BMI 33.09 kg/m²     Physical Exam  General Appearance: Alert and cooperative, in no acute distress, well-developed/well-nourished male.  Neck: Supple and without adenopathy or rigidity.  There is no JVD at 90° and no carotid bruits are noted.  There is no thyromegaly, thyroid tenderness, or palpable thyroid nodules.  Heart: Regular rate and rhythm without murmur or ectopy.  Respiratory: Lungs are clear to auscultation bilaterally with good air exchange.  Good respiratory effort and no accessory muscle use.  Skin: Good turgor, moist, warm and without rashes or lesions.  Neurological exam: Alert and oriented ×3, no tremor, normal gait.  Extremities: No clubbing, cyanosis, or edema    ENT: Mucous membranes are moist, right external auditory " canal and tympanic membrane appear normal.  The left external auditory canal is mildly erythematous.  Pharynx is without erythema or exudate.  There is no noted rhinorrhea.      Assessment/Plan     HTN: Blood pressure appears adequately controlled and we will continue with the current antihypertensive therapy.     HLD: Stable based on recent labs.  Risk factor reduction for CAD was discussed at length.  He has tolerated the statin therapy without side effects.     GERD: Stable based on symptoms.  He will continue the current dosing of omeprazole and appropriate dietary changes.     Migraine headaches: Stable on the current treatment plan.  Patient will continue to use the Maxalt as needed. The brand-name Maxalt has continued to work well.  HE CAN NOT TOLERATE THE GENERIC FORM AS HE EXPERIENCES SIDE EFFECTS OF DIZZINESS, UPSET STOMACH, NAUSEA, AND LETHARGY.      Allergic rhinitis:   5/2/2023: Allergy symptoms have been worse recently.  Patient continue with the current treatment plan and we will refer him to an allergist for further evaluation.     Crohn's Disease: Stable based on symptoms.  We will continue the current medication and continue to follow with his GI specialist.     Gout: Stable.  We will continue to monitor.     Stage 3a CKD: Stable based on labs.  He was encouraged to avoid NSAIDs.     Erythrocytosis: Labs revealed consistently elevated red blood cell count but has remained stable.  We will continue to monitor.     Obstructive sleep apnea:  He was started on CPAP but could not tolerate the pressure at 16 cm H2O.   Was changed to Auto pap and referred to sleep medicine for evaluation and suggestions on best course of action.     Obesity: Dietary changes, exercise, and maintenance of a healthy weight were discussed at length.    Vaccinations: Shingrix second dose given 11/20/2023.    Shortness of breath: We will refer him to pulmonology for additional evaluation.    Chronic otitis externa: We will  restart him on Ciprodex and refer him to ENT for evaluation of his recurrent symptoms.    COLONOSCOPY DUE 8/2024  PSA DUE 4/19/2023        Orders Placed This Encounter   Procedures    Flu vaccine (IIV4) age 6 months and greater, preservative free    Zoster vaccine, recombinant, adult (SHINGRIX)    Referral to Pulmonology    Referral to ENT     Requested Prescriptions     Signed Prescriptions Disp Refills    ciprofloxacin-dexamethasone (CiproDEX) otic suspension 7.5 mL 0     Sig: Administer 4 drops into the left ear 2 times a day for 7 days.

## 2023-11-20 NOTE — PATIENT INSTRUCTIONS
We will refer you to Pulmonology to evaluate the shortness of breath.    Follow up in 3 months.    It was a pleasure to see you today. Thank you for choosing us for your health care needs.    If you have lab or other testing completed and have not been informed of results within one week, please call the office for your results.    If you haven't done so, consider signing up for [facility] [employer], the [facility] personal health record. Ask the staff how you can get started.

## 2023-11-28 ENCOUNTER — CLINICAL SUPPORT (OUTPATIENT)
Dept: ALLERGY | Facility: CLINIC | Age: 53
End: 2023-11-28
Payer: COMMERCIAL

## 2023-11-28 DIAGNOSIS — J30.9 ALLERGIC RHINITIS, UNSPECIFIED SEASONALITY, UNSPECIFIED TRIGGER: ICD-10-CM

## 2023-11-28 PROCEDURE — 95117 IMMUNOTHERAPY INJECTIONS: CPT | Performed by: ALLERGY & IMMUNOLOGY

## 2023-11-30 ENCOUNTER — LAB (OUTPATIENT)
Dept: LAB | Facility: LAB | Age: 53
End: 2023-11-30
Payer: COMMERCIAL

## 2023-11-30 DIAGNOSIS — K50.10 CROHN'S DISEASE OF LARGE INTESTINE WITHOUT COMPLICATIONS (MULTI): Primary | ICD-10-CM

## 2023-11-30 PROCEDURE — 83993 ASSAY FOR CALPROTECTIN FECAL: CPT

## 2023-12-04 DIAGNOSIS — E78.2 HYPERLIPEMIA, MIXED: ICD-10-CM

## 2023-12-04 DIAGNOSIS — I10 HTN (HYPERTENSION), BENIGN: ICD-10-CM

## 2023-12-04 LAB — CALPROTECTIN STL-MCNT: 26 UG/G

## 2023-12-05 ENCOUNTER — APPOINTMENT (OUTPATIENT)
Dept: ALLERGY | Facility: CLINIC | Age: 53
End: 2023-12-05
Payer: COMMERCIAL

## 2023-12-05 RX ORDER — ATORVASTATIN CALCIUM 20 MG/1
20 TABLET, FILM COATED ORAL DAILY
Qty: 90 TABLET | Refills: 0 | Status: SHIPPED | OUTPATIENT
Start: 2023-12-05 | End: 2024-03-11 | Stop reason: SDUPTHER

## 2023-12-05 RX ORDER — ASPIRIN 81 MG/1
81 TABLET ORAL DAILY
Qty: 90 TABLET | Refills: 0 | Status: SHIPPED | OUTPATIENT
Start: 2023-12-05 | End: 2024-03-05 | Stop reason: SDUPTHER

## 2023-12-05 RX ORDER — TELMISARTAN AND HYDROCHLORTHIAZIDE 80; 25 MG/1; MG/1
1 TABLET ORAL DAILY
Qty: 90 TABLET | Refills: 0 | Status: SHIPPED | OUTPATIENT
Start: 2023-12-05 | End: 2024-03-05 | Stop reason: SDUPTHER

## 2023-12-06 ENCOUNTER — APPOINTMENT (OUTPATIENT)
Dept: OTOLARYNGOLOGY | Facility: CLINIC | Age: 53
End: 2023-12-06
Payer: COMMERCIAL

## 2023-12-12 ENCOUNTER — CLINICAL SUPPORT (OUTPATIENT)
Dept: ALLERGY | Facility: CLINIC | Age: 53
End: 2023-12-12
Payer: COMMERCIAL

## 2023-12-12 DIAGNOSIS — J30.9 ALLERGIC RHINITIS, UNSPECIFIED SEASONALITY, UNSPECIFIED TRIGGER: ICD-10-CM

## 2023-12-12 PROCEDURE — 95117 IMMUNOTHERAPY INJECTIONS: CPT | Performed by: ALLERGY & IMMUNOLOGY

## 2023-12-18 DIAGNOSIS — K21.9 GASTROESOPHAGEAL REFLUX DISEASE WITHOUT ESOPHAGITIS: ICD-10-CM

## 2023-12-18 RX ORDER — OMEPRAZOLE 40 MG/1
40 CAPSULE, DELAYED RELEASE ORAL DAILY
Qty: 90 CAPSULE | Refills: 0 | Status: SHIPPED | OUTPATIENT
Start: 2023-12-18 | End: 2024-03-18 | Stop reason: SDUPTHER

## 2023-12-19 ENCOUNTER — CLINICAL SUPPORT (OUTPATIENT)
Dept: ALLERGY | Facility: CLINIC | Age: 53
End: 2023-12-19
Payer: COMMERCIAL

## 2023-12-19 DIAGNOSIS — J30.9 ALLERGIC RHINITIS, UNSPECIFIED SEASONALITY, UNSPECIFIED TRIGGER: ICD-10-CM

## 2023-12-19 PROCEDURE — 95117 IMMUNOTHERAPY INJECTIONS: CPT | Performed by: ALLERGY & IMMUNOLOGY

## 2023-12-20 ENCOUNTER — CLINICAL SUPPORT (OUTPATIENT)
Dept: AUDIOLOGY | Facility: CLINIC | Age: 53
End: 2023-12-20
Payer: COMMERCIAL

## 2023-12-20 ENCOUNTER — OFFICE VISIT (OUTPATIENT)
Dept: OTOLARYNGOLOGY | Facility: CLINIC | Age: 53
End: 2023-12-20
Payer: COMMERCIAL

## 2023-12-20 VITALS — TEMPERATURE: 98.6 F | SYSTOLIC BLOOD PRESSURE: 146 MMHG | DIASTOLIC BLOOD PRESSURE: 90 MMHG

## 2023-12-20 DIAGNOSIS — H90.6 MIXED HEARING LOSS, BILATERAL: ICD-10-CM

## 2023-12-20 DIAGNOSIS — H72.91 TYMPANIC MEMBRANE PERFORATION, RIGHT: ICD-10-CM

## 2023-12-20 DIAGNOSIS — H90.6 MIXED HEARING LOSS, BILATERAL: Primary | ICD-10-CM

## 2023-12-20 DIAGNOSIS — H60.393 OTHER INFECTIVE CHRONIC OTITIS EXTERNA OF BOTH EARS: Primary | ICD-10-CM

## 2023-12-20 DIAGNOSIS — H72.91 PERFORATION OF RIGHT TYMPANIC MEMBRANE: ICD-10-CM

## 2023-12-20 DIAGNOSIS — J30.89 ALLERGIC RHINITIS DUE TO OTHER ALLERGIC TRIGGER, UNSPECIFIED SEASONALITY: ICD-10-CM

## 2023-12-20 PROCEDURE — 3080F DIAST BP >= 90 MM HG: CPT

## 2023-12-20 PROCEDURE — 99204 OFFICE O/P NEW MOD 45 MIN: CPT

## 2023-12-20 PROCEDURE — 3008F BODY MASS INDEX DOCD: CPT

## 2023-12-20 PROCEDURE — 1036F TOBACCO NON-USER: CPT

## 2023-12-20 PROCEDURE — 3077F SYST BP >= 140 MM HG: CPT

## 2023-12-20 PROCEDURE — 92550 TYMPANOMETRY & REFLEX THRESH: CPT | Performed by: AUDIOLOGIST

## 2023-12-20 PROCEDURE — 92557 COMPREHENSIVE HEARING TEST: CPT | Performed by: AUDIOLOGIST

## 2023-12-20 RX ORDER — CIPROFLOXACIN AND DEXAMETHASONE 3; 1 MG/ML; MG/ML
4 SUSPENSION/ DROPS AURICULAR (OTIC) 2 TIMES DAILY
Qty: 7.5 ML | Refills: 0 | Status: SHIPPED | OUTPATIENT
Start: 2023-12-20 | End: 2023-12-30

## 2023-12-20 RX ORDER — CEFDINIR 300 MG/1
300 CAPSULE ORAL 2 TIMES DAILY
Qty: 20 CAPSULE | Refills: 0 | Status: SHIPPED | OUTPATIENT
Start: 2023-12-20 | End: 2024-01-04 | Stop reason: WASHOUT

## 2023-12-20 ASSESSMENT — PAIN SCALES - GENERAL: PAINLEVEL_OUTOF10: 3

## 2023-12-20 ASSESSMENT — ENCOUNTER SYMPTOMS: OCCASIONAL FEELINGS OF UNSTEADINESS: 0

## 2023-12-20 NOTE — PROGRESS NOTES
DIAGNOSES/PROBLEMS:  -Mixed conductive and sensorineural hearing loss, bilateral  -Tympanic membrane perforation, right ear  -Chronic otitis externa of both ears    PROVIDER IMPRESSIONS:  ASSESSMENT: Jamil Cisse  is a pleasant 53 y.o. male with a history of chronic otitis externa with multiple/remote left ear surgeries who presents with symptoms of left otalgia, left aural pressure, and increased left hearing loss. Audiogram reviewed in detail with the patient, revealing moderate mixed hearing loss in the right ear and severe sloping to profound hearing loss in the left ear. Otologic exam revealed bilateral TM erythema and right TM perforation in the posterior/inferior quadrant, along with intact left TM with significant retraction and attic retraction in the superior/anterior quadrant. Based on the clinical information provided, symptoms and clinical exam findings are consistent with bilateral chronic OE and concern for cholesteatoma in the left ear. I explained to the patient that further evaluation and management should be continued with my otology physician colleagues.     PLAN:  I recommended Ciprodex (4 drops into each ear twice daily for 10 days) and oral Cefdinir (300 mg capsule twice daily for 10 days) for bilateral chronic OE. Prescription e-submitted to pharmacy.   I recommended that the patient continues dry ear precautions in both ears. The patient was counseled on dry ear precautions and verbalized understanding.  I recommended CT IAC wo contrast for further evaluation for cholesteatoma and/or other retrocochlear pathology. I explained to the patient that imaging should be performed prior to follow up with otology physician for review.  Patient was instructed to contact  radiology services for scheduling.  I recommended the patient continues use of Nasonex intranasal spray and initiates use of nasal saline spray 3-4 times daily as needed to increase nasal moisture. I also recommended that the  patient continues use of p.o. antihistamine Zyrtec daily for history of allergic rhinitis and maintenance of ET orifice patency.   I recommended referral to my otology physician colleagues to review CT IAC and for further evaluation and management, given the patient's complex otologic history. Internal referral e-submitted to otology physicians.   Follow-up: Patient instructed to schedule follow-up visit with an otology physician in the next 2-3 months, after the patient schedules CT IAC imaging. He may follow up with me sooner, if needed. All questions answered to patient's satisfaction.     Subjective   Patient ID Jamil Cisse is a 53 y.o. male who presents for initial evaluation of left ear pain, left ear pressure, and decreased hearing in the left ear.   History of Present Illness  Jamil Cisse is a 53 y.o. male who presents as a referral from Dr. Leo Flores (PCP) for chronic left otitis externa, with left ear pain/pressure and decreased hearing in the left ear. Most recent onset of symptoms began one week ago. Describes left ear pain as a dull ache that he currently rates a 3/10 on the numeric pain scale that is exacerbated by abrupt temperature changes or with water in the ear. He describes left ear pressure as a feeling that there is fluid in his ear and is accompanied by intermittent episodes of dizziness described as an unsteadiness/imbalance. Reports that one week ago noticed yellow/clear drainage from the left ear and placed a cotton ball in the ear to prevent leakage, denies current ear drainage. Denies ear itching, autophony, sudden hearing loss, or tinnitus. Denies otologic symptoms in the right ear. He was seen by his PCP on 11/20/2023 for left ear infection and was prescribed a 7 day course of Ciprodex drops for the left ear which he has continued to use leading up to this visit. He also completed a 10 day course of oral Cefdinir in October when last experienced left ear infection and  mentions that he cannot tolerate many oral antibiotics due to his history of Crohn's disease, managed by GI (Dr. Richard). He endorses a lifelong history of issues with his ears and history of recurrent ear infections throughout childhood in both ears. When asked about previous ear surgeries, he reports undergoing 2 surgeries to the left ear in 1988 (age 18) described as left tympanoplasty and bone reconstruction along with a revision of left tympanoplasty with Dr. Elmo Lara in 2000. 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 Denies family history of hearing loss. When asked about past or current use of hearing aids, the patient admits to none. When asked about past or current use of Q-tips or foreign objects in the ear canal, the patient admits to inserting Q-tips into the ear canals but denies inserting other foreign objects into the ears. Mentions that he tries to maintain dry ear precautions since his ear surgeries and prevents submerging his head underwater while swimming and/or getting ears wet in the shower. Mentions a history of allergic rhinitis that is well controlled with oral p.o. antihistamine daily (Zyrtec) and intranasal steroid spray (Nasonex).   Past Medical History:   Diagnosis Date    COVID-19 virus infection 03/24/2023    Personal history of infections of the central nervous system 02/2018    History of Herpes Encephalitis    Personal history of other endocrine, nutritional and metabolic disease 08/05/2020    History of hypercalcemia    Personal history of other infectious and parasitic diseases 02/2018    History of herpes encephalitis    Personal history of renal cancer 2005    History of other malignant neoplasm of RIGHT kidney      Past Surgical History:   Procedure Laterality Date    APPENDECTOMY  03/22/2018    Appendectomy    NEPHRECTOMY Right 2005     Nephrectomy    OTHER SURGICAL HISTORY  05/20/2020    Shoulder surgery    OTHER SURGICAL HISTORY  05/20/2020    Vasectomy    OTHER SURGICAL HISTORY  07/09/2020    Ear surgery    OTHER SURGICAL HISTORY  07/09/2020    Finger surgical procedure    OTHER SURGICAL HISTORY  07/09/2020    Throat surgery    ROTATOR CUFF REPAIR Right 09/23/2022    Right rotator cuff repair with subacromial decompression    TOTAL HIP ARTHROPLASTY Right 05/2017    Hip replacement    TOTAL HIP ARTHROPLASTY Left 06/08/2020      Allergies   Allergen Reactions    Adhesive Tape-Silicones Unknown and Other     skin pulled off    Codeine Unknown    Latex Other     Added based on information entered during log entry, please review and add reactions, type, and severity as needed    Sulfa (Sulfonamide Antibiotics) Other    Adhesive Other and Unknown     Pulls skin off    Amlodipine Besylate Rash     Flush    Lisinopril Other     ineffective    Sumatriptan Other     ineffective    Valsartan Other     ineffective        Current Outpatient Medications:     aspirin 81 mg EC tablet, Take 1 tablet (81 mg) by mouth once daily., Disp: 90 tablet, Rfl: 0    atorvastatin (Lipitor) 20 mg tablet, Take 1 tablet (20 mg) by mouth once daily., Disp: 90 tablet, Rfl: 0    certolizumab pegol (Cimzia Powder for Reconst) injection, Inject 1 mL (200 mg) under the skin., Disp: , Rfl:     cetirizine (ZyrTEC) 10 mg tablet, Take by mouth once daily., Disp: , Rfl:     colchicine, gout, 0.6 mg tablet, Take 1 tablet (0.6 mg) by mouth once daily., Disp: 90 tablet, Rfl: 0    febuxostat (Uloric) 40 mg tablet, Take 1 tablet (40 mg) by mouth once daily., Disp: 90 tablet, Rfl: 0    fexofenadine (Allegra) 180 mg tablet, Take 1 tablet (180 mg) by mouth once daily., Disp: , Rfl:     fluticasone (Flonase) 50 mcg/actuation nasal spray, Administer 2 sprays into each nostril once daily., Disp: 48 g, Rfl: 0    montelukast (Singulair) 10 mg tablet, Take 1 tablet (10 mg) by mouth once daily at  bedtime., Disp: , Rfl:     omeprazole (PriLOSEC) 40 mg DR capsule, Take 1 capsule (40 mg) by mouth once daily., Disp: 90 capsule, Rfl: 0    telmisartan-hydrochlorothiazid (MIcarDIS HCT) 80-25 mg tablet, Take 1 tablet by mouth once daily., Disp: 90 tablet, Rfl: 0    Xiidra 5 % dropperette, INSTILL 1 DROP IN BOTH EYES TWICE A DAY, Disp: , Rfl:     aspirin-acetaminophen-caffeine (Excedrin Extra Strength) 250-250-65 mg tablet, Take 1 tablet by mouth every 6 hours if needed., Disp: , Rfl:     Maxalt-MLT 10 mg disintegrating tablet, Take 1 tablet (10 mg) by mouth 1 time if needed for migraine. May repeat in 2 hours if unresolved. Do not exceed 30 mg in 24 hours., Disp: 30 tablet, Rfl: 0   Tobacco Use: Medium Risk (12/20/2023)    Patient History     Smoking Tobacco Use: Former     Smokeless Tobacco Use: Never     Passive Exposure: Not on file      Alcohol Use: Not on file      Social History     Substance and Sexual Activity   Drug Use Never        Review of Systems   All other systems negative.     Objective   Visit Vitals  /90 (BP Location: Right arm, Patient Position: Sitting)   Temp 37 °C (98.6 °F)   Smoking Status Former        Physical Exam  General appearance: Appears well, well-nourished, well groomed. No acute distress.   Constitutional: No fever, chills, weight loss or weight gain.  Communication: Normal communication  Psychiatric: Oriented to person, place and time. Normal mood and affect.  Neurologic: Cranial nerves II-XII grossly intact and symmetric bilaterally.  Cardiovascular: Examination of peripheral vascular system shows no clubbing or cyanosis.  Respiratory: No respiratory distress increased work of breathing. Inspection of the chest with symmetric chest expansion and normal respiratory effort.  Skin: No head and neck rashes.  Head: Normocephalic. Atraumatic with no masses, lesions or scarring.  Face: Normal symmetry. No scars or deformities.  Eyes: Conjunctiva not edematous or erythematous.  PERRLA  Neck: Supple and symmetric, trachea midline. Lymph nodes with no adenopathy.  Head: Normocephalic. Atraumatic with no masses, lesions or scarring.  Eyes: PERRL, EOMI, Conjunctiva is clear. No nystagmus.  Nose: External inspection of nose: No nasal lesions, lacerations or scars. No tenderness on frontal or maxillary sinus palpation.  Throat:  Floor of mouth is clear, no masses.  Tongue appears normal, no lesions or masses. Gums, gingiva, buccal mucosa appear pink and moist, no lesions. Teeth are in intact.  No obvious dental infections.  Peritonsillar regions appear symmetric without swelling.  Hard and soft palate appear normal, no obvious cleft. Uvula is midline.  Neck: Supple, no lymphadenopathy.  No masses.  TMJ: Normal, no trismus.  Right Ear: External inspection of ear with no deformity, scars, or masses. Mastoid is nontender. External auditory canal is appears erythematous with clear drainage throughout. Tympanic membrane appears erythematous with 20% perforation at the posterior/inferior quadrant no sign of effusion, or retraction.    Left Ear: External inspection of ear with no deformity, scars, or masses. Mastoid is nontender. External auditory canal clear with diffuse erythematous appearance. Tympanic membrane appears erythematous and significantly retracted and opaque with attic retraction in the anterior/superior quadrant with diffuse inflammation. No visible perforation or effusion.       Results   I personally reviewed the patient's audiogram today from 12/20/2023 which showed: Right ear with moderate rising to mild conductive hearing loss through 500 Hz with normal hearing sensitivity at 1,000 Hz, sloping to a mild sensorineural hearing loss through 3,000 Hz, sloping to a moderate to mild mixed hearing loss above (Note inefficient masking in the right ear at 4,000 Hz). Left ear with moderate essentially flat conductive hearing loss through 2,000 Hz, sloping to a severe to profound mixed  hearing loss above.  Type B tympanogram in the right ear and normal tympanogram in the left ear. Excellent word discrimination in right ear at 70 dB, and word discrimination in the left ear was good (88%) at 90 dB. Acoustic reflexes absent right ipsilateral, and absent left ipsilateral.  When compared to the previous audiogram from 4/7/2023 there has been essentially no change in the right ear and a slight decrease in left ear air conduction scores above 1,000 Hz.       Jocelyn Lopes, APRN-CNP

## 2023-12-20 NOTE — PROGRESS NOTES
AUDIOLOGY ADULT AUDIOMETRIC EVALUATION      Name:  Jamil Cisse  :  1970  Age:  53 y.o.  Date of Evaluation: 23    History:  Reason for visit:  Mr. Jamil Cisse was seen today as part of the visit with LJ Cardenas for an evaluation of hearing.   Chief Complaint   Patient presents with    Hearing Loss    Ear Pressure     Recurrent ear infections     The patient reported a long standing history of ear problems, especially on the left side. Stated he has a history of recurrent ear infections as well as left sided ear surgery. Reported he has a previous history of a left sided tympanoplasty as well as bone reconstruction surgery of the left middle ear. Stated he has experienced a recent left sided ear infection with some drainage and has completed ear drops and antibiotics, which have resolved his problems in the past.   Reported he continues to experience left sided ear pressure and otalgia, rated as a 3/10 on the pain scale. He has noticed decreased hearing in the left ear. Stated his right ear hearing is better, however, it is not perfect.   Previous hearing testing performed on 23 indicated a mild to moderate reverse cookie bite mixed hearing loss in the right ear and a severe rising to moderately severe mixed loss in the left ear.   Denied any chronic tinnitus, dizziness/vertigo,  recent falls, or sudden hearing loss.    EVALUATION      See Audiogram    RESULTS:    Otoscopic Evaluation:   Right Ear: Otoscopy for the right ear revealed a clear healthy canal and a healthy tympanic membrane with a perforation and scarring was visualized.   Left Ear: Otoscopy for the left ear revealed a clear healthy canal and a healthy tympanic membrane was visualized.     Immittance:  Immittance Measures: 226 Hz   Right Ear: Tympanometric testing revealed a type B flat tympanogram with no measurable middle ear pressure or static compliance and a large ear canal volume. Results may be consistent  with a tympanic membrane perforation.   Left Ear: Tympanometric testing revealed a normal type A tympanogram with normal middle ear pressure and normal static compliance.    Right Ear: Ipsilateral acoustic reflexes were absent at, 500-4,000 Hz. Results are consistent with the test results.   Left Ear: Ipsilateral acoustic reflexes were absent at, 500-4,000 Hz. Results are consistent with the test results.     Test technique:  Pure Tone Audiometry via insert earphones    Reliability:   excellent    Pure Tone Audiometry:    Right Ear: Audiometric testing indicated a moderate rising to mild conductive hearing loss through 500 Hz with normal hearing sensitivity at 1,000 Hz, sloping to a mild sensorineural hearing loss through 3,000 Hz, sloping to a moderate to mild mixed hearing loss above. Note inefficient masking in the right ear at 4,000 Hz.  Left Ear:   Audiometric testing indicated a moderate essentially flat conductive hearing loss through 2,000 Hz, sloping to a severe to profound mixed hearing loss above.       Speech Audiometry:   Right Ear:  Speech Reception Threshold (SRT) was obtained at 30 dBHL                  Word Recognition scores were excellent (96%) in quiet when words were presented at 70 dBHL  Left Ear:  Speech Reception Threshold (SRT) was obtained at 65 dBHL                 Word Recognition scores were good (88%) in quiet when words were presented at 90 dBHL  Testing was performed with recorded NU-6 speech words in quiet. Speech thresholds were in good agreement with the pure tone averages in each ear.     IMPRESSIONS:  Today's test results are hearing loss requiring medical/otologic and audiologic follow-up.  The patient was counseled with regard to the findings.  When compared to the previous test results of 4/7/23 there has been essentially no change in the right ear and a slight decrease in left ear air conduction scores above 1,000 Hz.  Amplification needs:  Hearing aids should be considered  due to the hearing loss and the patient's concerns for hearing difficulties.     RECOMMENDATIONS:  * Continue medical follow up with LJ Cardenas.  * Retest as medically indicated, or sooner if a change in hearing sensitivity is noticed.   * Wear hearing protection while in the presence of loud sounds.   * Pending medical management and patient desire, consider returning for a hearing aid evaluation to discuss amplification options and communication needs. The patient was instructed to call their insurance to check for any hearing aid benefits and to determine if [facility] was in network for hearing aids.   * Use effective communication strategies such as asking the speaker to gain attention prior to speaking, speaking in the same room, repeating words that were heard, etc.    PATIENT EDUCATION:   Discussed results and recommendations with the patient and a copy of the hearing test was provided.  Questions were addressed and the patient was encouraged to contact our department should concerns arise.  The patient was seen from  10:00-10:30 am.

## 2023-12-25 NOTE — PATIENT INSTRUCTIONS
DIAGNOSES/PROBLEMS:  -Mixed conductive and sensorineural hearing loss, bilateral  -Tympanic membrane perforation, right ear  -Chronic otitis externa of both ears    PLAN:  - Chronic otitis externa of both ears: 10 day course of Ciprodex ear drops and oral Cefdinir. Please complete the course of both medications as prescribed. Contact my office for any concerns or side effects with medication. Prescription electronically submitted to pharmacy.    -Order for CT IAC without contrast: please schedule with  Radiology services to schedule imaging. This is to evaluate structures in the middle ear spaces.     -Continue with intranasal steroid spray and daily Zyrtec. May add intranasal saline spray (over the counter) as needed to improve nasal moisture.     -Referral to  Otology Physicians: Please schedule follow-up with an otology physician in the next 2-3 months, AFTER receiving CT IAC to review the imaging and determine future care recommendations.    Please contact my office for any questions/concerns or for follow-up if needed sooner than scheduled appointment with Otology. Thank you.

## 2023-12-26 NOTE — PROGRESS NOTES
Patient: Jamil Cisse    [id number]  : 1970 -- AGE 53 y.o.    Provider: Trish SANTIAGO- [facility]     Location [facility]   Service Date: 24          [facility]  New Visit Note    HISTORY OF PRESENT ILLNESS     The patient's referring provider is: Leo Flores DO    HISTORY OF PRESENT ILLNESS   Jamil Cisse is a 53 y.o. male who is a former smoker (26 pack years), who presents to a [facility] for an initial evaluation for shortness of breath.     I have independently interviewed and examined the patient in the office and reviewed available records.    Current History    On today's visit, the patient reports he noticed this past summer he was more short of breath, but states that it is not every day. States some days just walking to work makes him feel short of breath.  He works out regularly,has noticed after 2 minutes on elliptical he feels very short of breath.  He thought maybe it was his allergies he is taking 10 mg of cetirizine and of Allegra daily, he was started on montelukast a few months ago by allergist. He had COVID in the summer  and has noticed his allergies getting worse.  He denies cough, wheeze, shortness of breath at rest, chest pain. He has ulcerative colitis was on Humira and is now on Cimzia injections. He had encephalitis about 5 years ago.    Previous pulmonary history: He has no history of recurrent infections, or lung disease as a child.  He had no previous lung hx, never on oxygen or inhaler therapy.     Inhalers/nebulized medications: none    Hospitalization History: He has not been hospitalized over the last year for breathing related problem.    Sleep history: WILFRID- CPAP    ALLERGIES AND MEDICATIONS     ALLERGIES  Allergies   Allergen Reactions    Adhesive Tape-Silicones Unknown and Other     skin pulled off    Codeine Unknown    Latex Other     Added based on information entered  during log entry, please review and add reactions, type, and severity as needed    Sulfa (Sulfonamide Antibiotics) Other    Adhesive Other and Unknown     Pulls skin off    Amlodipine Besylate Rash     Flush    Lisinopril Other     ineffective    Sumatriptan Other     ineffective    Valsartan Other     ineffective       MEDICATIONS  Current Outpatient Medications   Medication Sig Dispense Refill    aspirin 81 mg EC tablet Take 1 tablet (81 mg) by mouth once daily. 90 tablet 0    aspirin-acetaminophen-caffeine (Excedrin Extra Strength) 250-250-65 mg tablet Take 1 tablet by mouth every 6 hours if needed.      atorvastatin (Lipitor) 20 mg tablet Take 1 tablet (20 mg) by mouth once daily. 90 tablet 0    cefdinir (Omnicef) 300 mg capsule Take 1 capsule (300 mg) by mouth 2 times a day for 10 days. 20 capsule 0    certolizumab pegol (Cimzia Powder for Reconst) injection Inject 1 mL (200 mg) under the skin.      cetirizine (ZyrTEC) 10 mg tablet Take by mouth once daily.      ciprofloxacin-dexamethasone (CiproDEX) otic suspension Administer 4 drops into each ear 2 times a day for 10 days. 7.5 mL 0    colchicine, gout, 0.6 mg tablet Take 1 tablet (0.6 mg) by mouth once daily. 90 tablet 0    febuxostat (Uloric) 40 mg tablet Take 1 tablet (40 mg) by mouth once daily. 90 tablet 0    fexofenadine (Allegra) 180 mg tablet Take 1 tablet (180 mg) by mouth once daily.      fluticasone (Flonase) 50 mcg/actuation nasal spray Administer 2 sprays into each nostril once daily. 48 g 0    Maxalt-MLT 10 mg disintegrating tablet Take 1 tablet (10 mg) by mouth 1 time if needed for migraine. May repeat in 2 hours if unresolved. Do not exceed 30 mg in 24 hours. 30 tablet 0    montelukast (Singulair) 10 mg tablet Take 1 tablet (10 mg) by mouth once daily at bedtime.      omeprazole (PriLOSEC) 40 mg DR capsule Take 1 capsule (40 mg) by mouth once daily. 90 capsule 0    telmisartan-hydrochlorothiazid (MIcarDIS HCT) 80-25 mg tablet Take 1 tablet by  mouth once daily. 90 tablet 0    Xiidra 5 % dropperette INSTILL 1 DROP IN BOTH EYES TWICE A DAY       No current facility-administered medications for this visit.         PAST HISTORY     PAST MEDICAL HISTORY  Allergic rhinitis  Crohn's disease  GERD  Gout  Hypertension  Mixed hyperlipidemia  Migraines  CKD stage III  WILFRID  Encephalitis - 5 years      PAST SURGICAL HISTORY  Past Surgical History:   Procedure Laterality Date    APPENDECTOMY  2018    Appendectomy    NEPHRECTOMY Right 2005    Nephrectomy    OTHER SURGICAL HISTORY  2020    Shoulder surgery    OTHER SURGICAL HISTORY  2020    Vasectomy    OTHER SURGICAL HISTORY  2020    Ear surgery    OTHER SURGICAL HISTORY  2020    Finger surgical procedure    OTHER SURGICAL HISTORY  2020    Throat surgery    ROTATOR CUFF REPAIR Right 2022    Right rotator cuff repair with subacromial decompression    TOTAL HIP ARTHROPLASTY Right 2017    Hip replacement    TOTAL HIP ARTHROPLASTY Left 2020       IMMUNIZATION HISTORY  Immunization History   Administered Date(s) Administered    Flu vaccine (IIV4), preservative free *Check age/dose* 10/01/2018, 10/01/2019, 2020, 2022, 2023    Influenza, Unspecified 2010, 2022    Influenza, injectable, quadrivalent 09/15/2017    Influenza, seasonal, injectable 10/05/2021    Influenza, seasonal, injectable, preservative free 10/08/2016    PPD Test 2015    Pfizer Gray Cap SARS-CoV-2 2022    Pfizer Purple Cap SARS-CoV-2 2021, 2021    Zoster vaccine, recombinant, adult (SHINGRIX) 2023, 2023       SOCIAL HISTORY  Tobacco Smokin- 47 y/o- 1 pack/day - 26 pack years  Illicit drugs: none  Alcohol consumption: socially  Pets: no    OCCUPATIONAL/ENVIRONMENTAL HISTORY  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  Unknown exposure to asbestos, silica, beryllium or inhaled metals.    FAMILY HISTORY  No family history of  pulmonary disease.  No family history of cancer.  Mother and maternal grandparents -rheumatoid arthritis    REVIEW OF SYSTEMS     REVIEW OF SYSTEMS  Review of Systems    Constitutional: No fever, no chills, no night sweats.    Eyes: No double vision, no floaters, no dry eyes.   ENT: See HPI.   Neck: No neck stiffness.  Cardiovascular: No sharp chest pain, no heart racing, no leg swelling.  Respiratory: as noted in HPI.   Gastrointestinal: No nausea, no vomiting, no diarrhea.   Musculoskeletal: No joint pain, no back pain.   Integumentary: No rashes or sores.  Neurological: No dizziness, no headaches. Sleeping well.  Psychiatric: No mood changes.   Endocrine: No hot flashes, no cold intolerance, weight is stable.  Hematologic: No easy bruising or bleeding.    PHYSICAL EXAM     VITAL SIGNS:   Vitals:    01/04/24 1414   BP: 157/90   Pulse: 97   Resp: 20   Temp: 36.8 °C (98.3 °F)   SpO2: 93%         CURRENT WEIGHT: Body mass index is 34.35 kg/m².    PREVIOUS WEIGHTS:  Wt Readings from Last 3 Encounters:   11/20/23 102 kg (224 lb 1.6 oz)   11/16/23 99.8 kg (220 lb)   10/02/23 104 kg (230 lb)       Physical Exam    Constitutional: General appearance: Alert and oriented.  No acute distress. Well developed, well nourished.  Head and face: Symmetric  ENT: external inspection of ear and nose normal. No intranasal polyps. No oropharyngeal exudates.    Oropharynx: normal   Neck: supple, no lymphadenopathy  Pulmonary: Chest is normal. No increased work of breathing or signs of respiratory distress. Clear to auscultation bilaterally - no crackles, wheezing, or rhonchi.   Cardiovascular: Heart rate and rhythm normal. Normal S1, S2 - no murmurs, gallops, or pericardial rub.   Abdomen: Soft, non tender, +BS  Extremities: No edema. No clubbing or cyanosis of the fingernails.    Neurologic: Moves all four extremities   MSK: Normal movements of extremities. Gait normal   Psychiatric: Intact judgement and insight.    RESULTS/DATA  "    Pulmonary Function Test Results                                  Chest Radiograph     No results found for this or any previous visit from the past 2000 days.      Chest CT Scan     No testing.      Echocardiogram     No testing.       Labwork   Complete Blood Count  Lab Results   Component Value Date    WBC 7.5 07/24/2023    HGB 15.4 07/24/2023    HCT 48.8 07/24/2023    MCV 82 07/24/2023     07/24/2023       Peripheral Eosinophil Count/Percentage:   Eosinophils Absolute (x10E9/L)   Date Value   04/13/2023 0.17     Eosinophils % (%)   Date Value   04/13/2023 1.8       Serum Immunoglobulin E:    No results found for: \"IGE\"     ASSESSMENT/PLAN   Mr. Cisse is a 53 y.o. male, who is a former smoker (26 pack years), who presents to a [facility] for an initial evaluation for shortness of breath.     Problem List and Orders  Problem List Items Addressed This Visit    None  Visit Diagnoses       Restrictive lung disease    -  Primary    Relevant Orders    CT chest wo IV contrast    SOB (shortness of breath)        Relevant Medications    albuterol (Ventolin HFA) 90 mcg/actuation inhaler    Other Relevant Orders    Transthoracic Echo (TTE) Complete    CT chest wo IV contrast            Assessment and Plan / Recommendations:  Problem List Items Addressed This Visit    None     Dyspnea; Mild restriction on PFTs  - will get Echocardiogram  - will get CT Chest      Follow up in 4 weeks after testing or sooner if needed.    If you have any questions please call the office [phone]    Thank you for visiting the Pulmonary clinic today!   Trish Ruiz CNP  [phone]   "

## 2023-12-29 ENCOUNTER — ANCILLARY PROCEDURE (OUTPATIENT)
Dept: RADIOLOGY | Facility: CLINIC | Age: 53
End: 2023-12-29
Payer: COMMERCIAL

## 2023-12-29 DIAGNOSIS — H90.6 MIXED HEARING LOSS, BILATERAL: ICD-10-CM

## 2023-12-29 DIAGNOSIS — H72.91 TYMPANIC MEMBRANE PERFORATION, RIGHT: ICD-10-CM

## 2023-12-29 DIAGNOSIS — H60.393 OTHER INFECTIVE CHRONIC OTITIS EXTERNA OF BOTH EARS: ICD-10-CM

## 2023-12-29 PROCEDURE — 70480 CT ORBIT/EAR/FOSSA W/O DYE: CPT

## 2023-12-29 PROCEDURE — 70480 CT ORBIT/EAR/FOSSA W/O DYE: CPT | Performed by: RADIOLOGY

## 2024-01-02 ENCOUNTER — CLINICAL SUPPORT (OUTPATIENT)
Dept: ALLERGY | Facility: CLINIC | Age: 54
End: 2024-01-02
Payer: COMMERCIAL

## 2024-01-02 DIAGNOSIS — J30.9 ALLERGIC RHINITIS, UNSPECIFIED SEASONALITY, UNSPECIFIED TRIGGER: ICD-10-CM

## 2024-01-02 PROCEDURE — 95117 IMMUNOTHERAPY INJECTIONS: CPT | Performed by: ALLERGY & IMMUNOLOGY

## 2024-01-04 ENCOUNTER — OFFICE VISIT (OUTPATIENT)
Dept: PULMONOLOGY | Facility: CLINIC | Age: 54
End: 2024-01-04
Payer: COMMERCIAL

## 2024-01-04 ENCOUNTER — LAB (OUTPATIENT)
Dept: LAB | Facility: LAB | Age: 54
End: 2024-01-04
Payer: COMMERCIAL

## 2024-01-04 VITALS
HEIGHT: 69 IN | BODY MASS INDEX: 34.45 KG/M2 | TEMPERATURE: 98.3 F | RESPIRATION RATE: 20 BRPM | SYSTOLIC BLOOD PRESSURE: 157 MMHG | DIASTOLIC BLOOD PRESSURE: 90 MMHG | OXYGEN SATURATION: 93 % | WEIGHT: 232.6 LBS | HEART RATE: 97 BPM

## 2024-01-04 DIAGNOSIS — K50.10 CROHN'S DISEASE OF LARGE INTESTINE WITHOUT COMPLICATIONS (MULTI): Primary | ICD-10-CM

## 2024-01-04 DIAGNOSIS — J98.4 RESTRICTIVE LUNG DISEASE: Primary | ICD-10-CM

## 2024-01-04 DIAGNOSIS — R06.02 SOB (SHORTNESS OF BREATH): ICD-10-CM

## 2024-01-04 LAB
ALBUMIN SERPL BCP-MCNC: 4.3 G/DL (ref 3.4–5)
ALP SERPL-CCNC: 60 U/L (ref 33–120)
ALT SERPL W P-5'-P-CCNC: 48 U/L (ref 10–52)
ANION GAP SERPL CALC-SCNC: 13 MMOL/L (ref 10–20)
AST SERPL W P-5'-P-CCNC: 31 U/L (ref 9–39)
BILIRUB SERPL-MCNC: 0.4 MG/DL (ref 0–1.2)
BUN SERPL-MCNC: 25 MG/DL (ref 6–23)
CALCIUM SERPL-MCNC: 9.5 MG/DL (ref 8.6–10.3)
CHLORIDE SERPL-SCNC: 103 MMOL/L (ref 98–107)
CO2 SERPL-SCNC: 28 MMOL/L (ref 21–32)
CREAT SERPL-MCNC: 1.42 MG/DL (ref 0.5–1.3)
CRP SERPL-MCNC: 0.16 MG/DL
ERYTHROCYTE [DISTWIDTH] IN BLOOD BY AUTOMATED COUNT: 17.2 % (ref 11.5–14.5)
ERYTHROCYTE [SEDIMENTATION RATE] IN BLOOD BY WESTERGREN METHOD: 21 MM/H (ref 0–20)
GFR SERPL CREATININE-BSD FRML MDRD: 59 ML/MIN/1.73M*2
GLUCOSE SERPL-MCNC: 120 MG/DL (ref 74–99)
HCT VFR BLD AUTO: 43.6 % (ref 41–52)
HGB BLD-MCNC: 14 G/DL (ref 13.5–17.5)
MCH RBC QN AUTO: 25 PG (ref 26–34)
MCHC RBC AUTO-ENTMCNC: 32.1 G/DL (ref 32–36)
MCV RBC AUTO: 78 FL (ref 80–100)
NRBC BLD-RTO: 0 /100 WBCS (ref 0–0)
PLATELET # BLD AUTO: 323 X10*3/UL (ref 150–450)
POTASSIUM SERPL-SCNC: 4.1 MMOL/L (ref 3.5–5.3)
PROT SERPL-MCNC: 7.8 G/DL (ref 6.4–8.2)
RBC # BLD AUTO: 5.61 X10*6/UL (ref 4.5–5.9)
SODIUM SERPL-SCNC: 140 MMOL/L (ref 136–145)
WBC # BLD AUTO: 11.3 X10*3/UL (ref 4.4–11.3)

## 2024-01-04 PROCEDURE — 3080F DIAST BP >= 90 MM HG: CPT

## 2024-01-04 PROCEDURE — 86140 C-REACTIVE PROTEIN: CPT

## 2024-01-04 PROCEDURE — 85027 COMPLETE CBC AUTOMATED: CPT

## 2024-01-04 PROCEDURE — 36415 COLL VENOUS BLD VENIPUNCTURE: CPT

## 2024-01-04 PROCEDURE — 3077F SYST BP >= 140 MM HG: CPT

## 2024-01-04 PROCEDURE — 99204 OFFICE O/P NEW MOD 45 MIN: CPT

## 2024-01-04 PROCEDURE — 3008F BODY MASS INDEX DOCD: CPT

## 2024-01-04 PROCEDURE — 80053 COMPREHEN METABOLIC PANEL: CPT

## 2024-01-04 PROCEDURE — 1036F TOBACCO NON-USER: CPT

## 2024-01-04 PROCEDURE — 85652 RBC SED RATE AUTOMATED: CPT

## 2024-01-04 RX ORDER — ALBUTEROL SULFATE 90 UG/1
2 AEROSOL, METERED RESPIRATORY (INHALATION) EVERY 6 HOURS PRN
Qty: 18 G | Refills: 3 | Status: SHIPPED | OUTPATIENT
Start: 2024-01-04

## 2024-01-08 ENCOUNTER — APPOINTMENT (OUTPATIENT)
Dept: RADIOLOGY | Facility: CLINIC | Age: 54
End: 2024-01-08
Payer: COMMERCIAL

## 2024-01-08 DIAGNOSIS — M1A.9XX0 CHRONIC GOUT WITHOUT TOPHUS, UNSPECIFIED CAUSE, UNSPECIFIED SITE: ICD-10-CM

## 2024-01-08 RX ORDER — COLCHICINE 0.6 MG/1
0.6 TABLET ORAL DAILY
Qty: 90 TABLET | Refills: 0 | Status: SHIPPED | OUTPATIENT
Start: 2024-01-08 | End: 2024-04-01 | Stop reason: SDUPTHER

## 2024-01-08 RX ORDER — FEBUXOSTAT 40 MG/1
40 TABLET, FILM COATED ORAL DAILY
Qty: 90 TABLET | Refills: 0 | Status: SHIPPED | OUTPATIENT
Start: 2024-01-08 | End: 2024-04-01 | Stop reason: SDUPTHER

## 2024-01-09 ENCOUNTER — APPOINTMENT (OUTPATIENT)
Dept: ALLERGY | Facility: CLINIC | Age: 54
End: 2024-01-09
Payer: COMMERCIAL

## 2024-01-15 PROBLEM — J30.2 SEASONAL ALLERGIC RHINITIS: Status: RESOLVED | Noted: 2023-05-02 | Resolved: 2024-01-15

## 2024-01-15 NOTE — PROGRESS NOTES
"    Subjective   Patient ID:   [id number]   Jamil Cisse \"Mau\" is a 54 y.o. male who presents for Allergies (6 moth follow up).    Chief Complaint   Patient presents with    Allergies     6 moth follow up        Started IT July 2023.    Since last visit, 06-, patient reports shots are going well and he denies any local reactions.  His Sx have improved with the shots.  He woke up with a raspy voice today and likely needs a humidifier.  He had one in the past.    Patient saw JACK Licea-CNP, Pulmonologist, 01-.  She ordered a chest CT and an echo due to his SOB.  He was also prescribed an inhaler but still has issues with his asthma.    Patient experiences heartburn which is controlled with omeprazole.  Donuts and orange juice trigger his reflux.    Patient reports he has chronic ear infections  and S/P 3 ear surgeries.  His drum has relapsed and he has chronic drainage.  He is currently using Ciprodex drops and has an appointment with a specialist 02-.      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        Physical Exam  Constitutional:       Appearance: Normal appearance.   HENT:      Head: Normocephalic and atraumatic.      Right Ear: External ear normal. There is no impacted cerumen.      Left Ear: External ear normal. There is no impacted cerumen.      Ears:      Comments: Right TM with effusion     Nose: No congestion or rhinorrhea.   Eyes:      Extraocular Movements: Extraocular movements intact.      Conjunctiva/sclera: Conjunctivae normal.      Pupils: Pupils are equal, round, and reactive to light.   Cardiovascular:      Rate and Rhythm: Normal rate and regular rhythm.      Heart sounds: No murmur heard.     No friction rub. No gallop.   Pulmonary:      Effort: No respiratory distress.      Breath sounds: No wheezing, rhonchi or rales.   Skin:     General: Skin " is warm and dry.   Neurological:      Mental Status: He is alert.   Psychiatric:         Mood and Affect: Mood normal.         Behavior: Behavior normal.       Assessment/Plan     Allergic rhinitis  He is doing very well with immunotherapy. His symptoms have improved. He will continue his immunotherapy.        By signing my name below, I, Alhaji Tsang, attest that this documentation has been prepared under the direction and in the presence of Donita De La Cruz MD.   All medical record entries made by the Scribe were at my direction and personally dictated by me. I have reviewed the chart and agree that the record accurately reflects my personal performance of the history, physical exam, discussion and plan.

## 2024-01-16 ENCOUNTER — OFFICE VISIT (OUTPATIENT)
Dept: ALLERGY | Facility: CLINIC | Age: 54
End: 2024-01-16
Payer: COMMERCIAL

## 2024-01-16 DIAGNOSIS — H10.10 ALLERGIC CONJUNCTIVITIS, UNSPECIFIED LATERALITY: ICD-10-CM

## 2024-01-16 DIAGNOSIS — J30.9 ALLERGIC RHINITIS, UNSPECIFIED SEASONALITY, UNSPECIFIED TRIGGER: Primary | ICD-10-CM

## 2024-01-16 PROCEDURE — 3008F BODY MASS INDEX DOCD: CPT | Performed by: ALLERGY & IMMUNOLOGY

## 2024-01-16 PROCEDURE — 99213 OFFICE O/P EST LOW 20 MIN: CPT | Performed by: ALLERGY & IMMUNOLOGY

## 2024-01-16 PROCEDURE — 95117 IMMUNOTHERAPY INJECTIONS: CPT | Performed by: ALLERGY & IMMUNOLOGY

## 2024-01-16 PROCEDURE — 1036F TOBACCO NON-USER: CPT | Performed by: ALLERGY & IMMUNOLOGY

## 2024-01-16 ASSESSMENT — ENCOUNTER SYMPTOMS: VOICE CHANGE: 1

## 2024-01-16 NOTE — PATIENT INSTRUCTIONS
Shot was administered today.      Consider adding a humidifier set at 45% in your bedroom.      Continue Flonase, Allegra or cetirizine and montelukast.

## 2024-01-18 NOTE — ASSESSMENT & PLAN NOTE
He is doing very well with immunotherapy. His symptoms have improved. He will continue his immunotherapy.

## 2024-01-22 ENCOUNTER — OFFICE VISIT (OUTPATIENT)
Dept: PRIMARY CARE | Facility: CLINIC | Age: 54
End: 2024-01-22
Payer: COMMERCIAL

## 2024-01-22 VITALS
BODY MASS INDEX: 34.48 KG/M2 | HEART RATE: 85 BPM | DIASTOLIC BLOOD PRESSURE: 91 MMHG | HEIGHT: 69 IN | WEIGHT: 232.8 LBS | SYSTOLIC BLOOD PRESSURE: 169 MMHG | OXYGEN SATURATION: 97 % | TEMPERATURE: 97.7 F

## 2024-01-22 DIAGNOSIS — J20.9 ACUTE BRONCHITIS, UNSPECIFIED ORGANISM: Primary | ICD-10-CM

## 2024-01-22 DIAGNOSIS — J02.9 PHARYNGITIS, UNSPECIFIED ETIOLOGY: ICD-10-CM

## 2024-01-22 PROCEDURE — 3077F SYST BP >= 140 MM HG: CPT | Performed by: PHYSICIAN ASSISTANT

## 2024-01-22 PROCEDURE — 99213 OFFICE O/P EST LOW 20 MIN: CPT | Performed by: PHYSICIAN ASSISTANT

## 2024-01-22 PROCEDURE — 1036F TOBACCO NON-USER: CPT | Performed by: PHYSICIAN ASSISTANT

## 2024-01-22 PROCEDURE — 3008F BODY MASS INDEX DOCD: CPT | Performed by: PHYSICIAN ASSISTANT

## 2024-01-22 PROCEDURE — 3080F DIAST BP >= 90 MM HG: CPT | Performed by: PHYSICIAN ASSISTANT

## 2024-01-22 RX ORDER — CEFUROXIME AXETIL 250 MG/1
250 TABLET ORAL 2 TIMES DAILY
Qty: 20 TABLET | Refills: 0 | Status: SHIPPED | OUTPATIENT
Start: 2024-01-22 | End: 2024-02-01

## 2024-01-22 ASSESSMENT — PATIENT HEALTH QUESTIONNAIRE - PHQ9
SUM OF ALL RESPONSES TO PHQ9 QUESTIONS 1 AND 2: 0
1. LITTLE INTEREST OR PLEASURE IN DOING THINGS: NOT AT ALL
2. FEELING DOWN, DEPRESSED OR HOPELESS: NOT AT ALL

## 2024-01-22 NOTE — PROGRESS NOTES
"Subjective   Patient ID: Mau Cisse is a 54 y.o. male who presents for Sore Throat and Cough.    HPI     Complains of having a cough, sore throat lingering 14 days.  Voice weak, throat sore, feels swollen  OTC things tried, no help. IBP/Tylenol helps mildly.  Pt is having L ear pressure  Fever first 2 days, No sinus pressure  Chest congestion, cough is productive with thick, yellow/brown chunks being coughed up.   He denies any new SOB- he is seeing pulmonology.       Review of Systems  Constitutional: Patient denies any fever, chills, loss of appetite, or unexplained weight loss.  Cardiovascular: Denies any chest pain, shortness of breath with exertion, tachycardia, palpitations.  Respiratory: Patient denies any worsened shortness of breath, or wheezing. +cough  Skin:  Denies any rashes or skin lesions.    Objective   BP (!) 169/91   Pulse 85   Temp 36.5 °C (97.7 °F)   Ht 1.753 m (5' 9\")   Wt 106 kg (232 lb 12.8 oz)   SpO2 97%   BMI 34.38 kg/m²     Physical Exam  HEENT:  Head is normocephalic and atraumatic.  Mucous membranes are moist, external auditory canals and tympanic membranes within normal limits bilaterally, pharynx is with erythema but no exudate.  There is a trace of clear/thin rhinorrhea.  NECK:  Supple and without adenopathy or rigidity.  HEART:  Regular rate and rhythm without murmur or ectopy  LUNGS:  Coarse breath sounds are noted bilaterally with a coarse frequent cough.  Good air exchange  SKIN:  Good turgor, moist, warm and without rashes or lesions    Assessment/Plan   Diagnoses and all orders for this visit:  Acute bronchitis, unspecified organism  -     cefuroxime (Ceftin) 250 mg tablet; Take 1 tablet (250 mg) by mouth 2 times a day for 10 days.  Patient's cough sounds to be related to a bronchitis.  He is to use Mucinex 500 mg twice daily x 3 to 5 days and adding Ceftin twice daily x 10 days.  If symptoms or not improved by day 8 he is to call/return to the clinic.  At any point time " if he develops shortness of breath he is to call/go to the emergency room.    Pharyngitis, unspecified etiology-likely due to postnasal drip and drainage.  Patient was told to continue coating his throat, take anti-inflammatories to help with the irritation and numbing throat spray.  Again he is to return to clinic if symptoms do not improve after reasonable amount of time.      Patient understands that should they have testing outside   facilities that we may not receive the results and was told to call us if they have not heard from our office within a week after testing.    [facility] uses voice recognition technology for dictations. Sometimes the software misinterprets words. Please take this into account when reading this.

## 2024-01-23 ENCOUNTER — CLINICAL SUPPORT (OUTPATIENT)
Dept: ALLERGY | Facility: CLINIC | Age: 54
End: 2024-01-23
Payer: COMMERCIAL

## 2024-01-23 DIAGNOSIS — J30.9 ALLERGIC RHINITIS, UNSPECIFIED SEASONALITY, UNSPECIFIED TRIGGER: ICD-10-CM

## 2024-01-23 PROCEDURE — 95117 IMMUNOTHERAPY INJECTIONS: CPT | Performed by: ALLERGY & IMMUNOLOGY

## 2024-01-25 ENCOUNTER — APPOINTMENT (OUTPATIENT)
Dept: PODIATRY | Facility: CLINIC | Age: 54
End: 2024-01-25
Payer: COMMERCIAL

## 2024-01-30 ENCOUNTER — CLINICAL SUPPORT (OUTPATIENT)
Dept: ALLERGY | Facility: CLINIC | Age: 54
End: 2024-01-30
Payer: COMMERCIAL

## 2024-01-30 DIAGNOSIS — J30.1 SEASONAL ALLERGIC RHINITIS DUE TO POLLEN: ICD-10-CM

## 2024-01-30 PROCEDURE — 95117 IMMUNOTHERAPY INJECTIONS: CPT | Performed by: ALLERGY & IMMUNOLOGY

## 2024-02-01 DIAGNOSIS — J30.9 ALLERGIC RHINITIS, UNSPECIFIED SEASONALITY, UNSPECIFIED TRIGGER: ICD-10-CM

## 2024-02-01 RX ORDER — FLUTICASONE PROPIONATE 50 MCG
2 SPRAY, SUSPENSION (ML) NASAL DAILY
Qty: 48 G | Refills: 0 | Status: SHIPPED | OUTPATIENT
Start: 2024-02-01 | End: 2024-05-07 | Stop reason: SDUPTHER

## 2024-02-06 ENCOUNTER — CLINICAL SUPPORT (OUTPATIENT)
Dept: ALLERGY | Facility: CLINIC | Age: 54
End: 2024-02-06
Payer: COMMERCIAL

## 2024-02-06 DIAGNOSIS — J30.1 SEASONAL ALLERGIC RHINITIS DUE TO POLLEN: ICD-10-CM

## 2024-02-06 PROCEDURE — 95117 IMMUNOTHERAPY INJECTIONS: CPT | Performed by: ALLERGY & IMMUNOLOGY

## 2024-02-07 ENCOUNTER — APPOINTMENT (OUTPATIENT)
Dept: PULMONOLOGY | Facility: CLINIC | Age: 54
End: 2024-02-07
Payer: COMMERCIAL

## 2024-02-09 ENCOUNTER — OFFICE VISIT (OUTPATIENT)
Dept: OTOLARYNGOLOGY | Facility: CLINIC | Age: 54
End: 2024-02-09
Payer: COMMERCIAL

## 2024-02-09 VITALS
BODY MASS INDEX: 33.03 KG/M2 | HEART RATE: 87 BPM | DIASTOLIC BLOOD PRESSURE: 76 MMHG | TEMPERATURE: 98.2 F | SYSTOLIC BLOOD PRESSURE: 122 MMHG | WEIGHT: 223 LBS | HEIGHT: 69 IN

## 2024-02-09 DIAGNOSIS — H71.02 CHOLESTEATOMA OF ATTIC OF LEFT EAR: ICD-10-CM

## 2024-02-09 DIAGNOSIS — H90.6 MIXED HEARING LOSS, BILATERAL: Primary | ICD-10-CM

## 2024-02-09 DIAGNOSIS — H72.91 TYMPANIC MEMBRANE PERFORATION, RIGHT: ICD-10-CM

## 2024-02-09 PROCEDURE — 3078F DIAST BP <80 MM HG: CPT | Performed by: STUDENT IN AN ORGANIZED HEALTH CARE EDUCATION/TRAINING PROGRAM

## 2024-02-09 PROCEDURE — 1036F TOBACCO NON-USER: CPT | Performed by: STUDENT IN AN ORGANIZED HEALTH CARE EDUCATION/TRAINING PROGRAM

## 2024-02-09 PROCEDURE — 3008F BODY MASS INDEX DOCD: CPT | Performed by: STUDENT IN AN ORGANIZED HEALTH CARE EDUCATION/TRAINING PROGRAM

## 2024-02-09 PROCEDURE — 99214 OFFICE O/P EST MOD 30 MIN: CPT | Performed by: STUDENT IN AN ORGANIZED HEALTH CARE EDUCATION/TRAINING PROGRAM

## 2024-02-09 PROCEDURE — 3074F SYST BP LT 130 MM HG: CPT | Performed by: STUDENT IN AN ORGANIZED HEALTH CARE EDUCATION/TRAINING PROGRAM

## 2024-02-09 PROCEDURE — 99204 OFFICE O/P NEW MOD 45 MIN: CPT | Performed by: STUDENT IN AN ORGANIZED HEALTH CARE EDUCATION/TRAINING PROGRAM

## 2024-02-09 SDOH — ECONOMIC STABILITY: FOOD INSECURITY: WITHIN THE PAST 12 MONTHS, THE FOOD YOU BOUGHT JUST DIDN'T LAST AND YOU DIDN'T HAVE MONEY TO GET MORE.: NEVER TRUE

## 2024-02-09 SDOH — ECONOMIC STABILITY: FOOD INSECURITY: WITHIN THE PAST 12 MONTHS, YOU WORRIED THAT YOUR FOOD WOULD RUN OUT BEFORE YOU GOT MONEY TO BUY MORE.: NEVER TRUE

## 2024-02-09 ASSESSMENT — PAIN SCALES - GENERAL: PAINLEVEL: 4

## 2024-02-09 ASSESSMENT — LIFESTYLE VARIABLES
SKIP TO QUESTIONS 9-10: 0
HOW OFTEN DO YOU HAVE A DRINK CONTAINING ALCOHOL: 2-4 TIMES A MONTH
HOW MANY STANDARD DRINKS CONTAINING ALCOHOL DO YOU HAVE ON A TYPICAL DAY: 3 OR 4
AUDIT-C TOTAL SCORE: 3
HOW OFTEN DO YOU HAVE SIX OR MORE DRINKS ON ONE OCCASION: NEVER

## 2024-02-09 ASSESSMENT — COLUMBIA-SUICIDE SEVERITY RATING SCALE - C-SSRS
2. HAVE YOU ACTUALLY HAD ANY THOUGHTS OF KILLING YOURSELF?: NO
1. IN THE PAST MONTH, HAVE YOU WISHED YOU WERE DEAD OR WISHED YOU COULD GO TO SLEEP AND NOT WAKE UP?: NO
6. HAVE YOU EVER DONE ANYTHING, STARTED TO DO ANYTHING, OR PREPARED TO DO ANYTHING TO END YOUR LIFE?: NO

## 2024-02-09 ASSESSMENT — ENCOUNTER SYMPTOMS
DEPRESSION: 0
OCCASIONAL FEELINGS OF UNSTEADINESS: 0
LOSS OF SENSATION IN FEET: 0

## 2024-02-09 NOTE — LETTER
February 9, 2024     Jocelyn Lopes, APRN-CNP  [id number] [place] [address]    Patient: Mau Cisse   YOB: 1970   Date of Visit: 2/9/2024       Dear Dr. Jocelyn Lopes, APRN-CNP:    Thank you for referring Mau Cisse to me for evaluation. Below are my notes for this consultation.  If you have questions, please do not hesitate to call me. I look forward to following your patient along with you.       Sincerely,     Jamil Brizuela MD      CC: No Recipients  ______________________________________________________________________________________    CHIEF COMPLAINT:   Chief Complaint   Patient presents with   • Otitis Media       HISTORY OF PRESENT ILLNESS: Jamil Cisse is a 54 y.o. male who presents today with symptoms of left ear drainage and hearing loss.  He has a history of multiple surgeries on the left ear, most recently 8 years ago.  He reports that all of his surgeries have been through the ear canal.  He denies any postauricular incisions.  He says that he also had a history of tubes.  He also reports having a history of significant ear infections throughout his life, particularly in the left ear.  He also reports that he has a perforation in the right ear, but he does not have significant issues in the right side.    PAST MEDICAL HISTORY:   Past Medical History:   Diagnosis Date   • COVID-19 virus infection 03/24/2023   • Personal history of infections of the central nervous system 02/2018    History of Herpes Encephalitis   • Personal history of other endocrine, nutritional and metabolic disease 08/05/2020    History of hypercalcemia   • Personal history of other infectious and parasitic diseases 02/2018    History of herpes encephalitis   • Personal history of renal cancer 2005    History of other malignant neoplasm of RIGHT kidney       PAST SURGICAL HISTORY:   Past Surgical History:   Procedure Laterality Date   • APPENDECTOMY  03/22/2018    Appendectomy   •  NEPHRECTOMY Right 2005    Nephrectomy   • OTHER SURGICAL HISTORY  05/20/2020    Shoulder surgery   • OTHER SURGICAL HISTORY  05/20/2020    Vasectomy   • OTHER SURGICAL HISTORY  07/09/2020    Ear surgery   • OTHER SURGICAL HISTORY  07/09/2020    Finger surgical procedure   • OTHER SURGICAL HISTORY  07/09/2020    Throat surgery   • ROTATOR CUFF REPAIR Right 09/23/2022    Right rotator cuff repair with subacromial decompression   • TOTAL HIP ARTHROPLASTY Right 05/2017    Hip replacement   • TOTAL HIP ARTHROPLASTY Left 06/08/2020       MEDICATIONS:   Current Outpatient Medications:   •  albuterol (Ventolin HFA) 90 mcg/actuation inhaler, Inhale 2 puffs every 6 hours if needed for wheezing., Disp: 18 g, Rfl: 3  •  aspirin 81 mg EC tablet, Take 1 tablet (81 mg) by mouth once daily., Disp: 90 tablet, Rfl: 0  •  aspirin-acetaminophen-caffeine (Excedrin Extra Strength) 250-250-65 mg tablet, Take 1 tablet by mouth every 6 hours if needed., Disp: , Rfl:   •  atorvastatin (Lipitor) 20 mg tablet, Take 1 tablet (20 mg) by mouth once daily., Disp: 90 tablet, Rfl: 0  •  certolizumab pegol (Cimzia Powder for Reconst) injection, Inject 1 mL (200 mg) under the skin., Disp: , Rfl:   •  cetirizine (ZyrTEC) 10 mg tablet, Take by mouth once daily., Disp: , Rfl:   •  colchicine 0.6 mg tablet, Take 1 tablet (0.6 mg) by mouth once daily., Disp: 90 tablet, Rfl: 0  •  febuxostat (Uloric) 40 mg tablet, Take 1 tablet (40 mg) by mouth once daily., Disp: 90 tablet, Rfl: 0  •  fexofenadine (Allegra) 180 mg tablet, Take 1 tablet (180 mg) by mouth once daily., Disp: , Rfl:   •  fluticasone (Flonase) 50 mcg/actuation nasal spray, Administer 2 sprays into each nostril once daily., Disp: 48 g, Rfl: 0  •  montelukast (Singulair) 10 mg tablet, Take 1 tablet (10 mg) by mouth once daily at bedtime., Disp: , Rfl:   •  omeprazole (PriLOSEC) 40 mg DR capsule, Take 1 capsule (40 mg) by mouth once daily., Disp: 90 capsule, Rfl: 0  •   "telmisartan-hydrochlorothiazid (MIcarDIS HCT) 80-25 mg tablet, Take 1 tablet by mouth once daily., Disp: 90 tablet, Rfl: 0  •  Xiidra 5 % dropperette, INSTILL 1 DROP IN BOTH EYES TWICE A DAY, Disp: , Rfl:   •  Maxalt-MLT 10 mg disintegrating tablet, Take 1 tablet (10 mg) by mouth 1 time if needed for migraine. May repeat in 2 hours if unresolved. Do not exceed 30 mg in 24 hours. (Patient not taking: Reported on 1/4/2024), Disp: 30 tablet, Rfl: 0    ALLERGIES:   Allergies   Allergen Reactions   • Adhesive Tape-Silicones Unknown and Other     skin pulled off   • Codeine Unknown   • Latex Other     Added based on information entered during log entry, please review and add reactions, type, and severity as needed.  PT DENIES ALLERGY   • Sulfa (Sulfonamide Antibiotics) Other   • Adhesive Other and Unknown     Pulls skin off   • Amlodipine Besylate Rash     Flush   • Lisinopril Other     ineffective   • Sumatriptan Other     ineffective   • Valsartan Other     ineffective       SOCIAL HISTORY:   reports that he has quit smoking. His smoking use included cigarettes. He has a 6.00 pack-year smoking history. He has never used smokeless tobacco. He reports current alcohol use of about 4.0 standard drinks of alcohol per week. He reports that he does not use drugs.    PHYSICAL EXAM:    VITALS:   Vitals:    02/09/24 1148   BP: 122/76   BP Location: Left arm   Patient Position: Sitting   BP Cuff Size: Large adult long   Pulse: 87   Temp: 36.8 °C (98.2 °F)   TempSrc: Temporal   Weight: 101 kg (223 lb)   Height: 1.753 m (5' 9\")        GENERAL: healthy, alert, well developed, well nourished, no distress, cooperative, appears chronologic age    Communicates with normal voice and without hearing aids.    HEAD: atraumatic, normocephalic, no lesions    EYES: normal, PERRLA and EOM's intact    EARS:   Right ear demonstrates a patent external auditory canal with a tympanic membrane 10% posterior superior perforation.  I think I am " appreciating an eroded long process of the incus through the perforation.  The perforation is clean and dry.  Left ear: The left ear canal was patent.  The tympanic membrane was significantly retracted, particular in the posterior superior region.  There also was apparent attic retraction and debris in the posterior superior attic.  There was also some drainage in the ear canal coming from this debris in the posterior superior quadrant.  Mirza tuning fork test lateralizes lateralizes to the left 512 Hz.   Rinne testing with a 512 Hz tuning fork: Right Air conduction > Bone conduction   Left Bone conduction > Air conduction      NOSE: nose shows no deformity, asymmetry, or inflammation, nasal mucosa normal,     ORAL CAVITY/OROPHARYNX: negative findings: lips normal without lesions, buccal mucosa normal, gums healthy, teeth intact, non-carious, palate normal, tongue midline and normal, soft palate, uvula, and tonsils normal    NECK AND SALIVARY GLANDS: Neck is supple without masses or lymphadenopathy. Palpation of the parotid and submandibular gland reveals no masses or tenderness to palpation.    CRANIAL NERVES: intact,   Facial nerve exam  is House - Brackmann 1- Normal Function on the right and 1- Normal Function on the left.    CARDIOVASCULAR: No evidence of peripheral edema    PULMONARY: normal lung excursion, no evidence of retractions    DATA REVIEWED:  Audiogram  I reviewed the audiogram from 12/20/2023, which demonstrates right moderate rising to normal hearing, then sloping to moderate mixed hearing loss.  On the left, there is moderate sloping to severe mixed hearing loss.  There is a type B tympanogram on the right and a type a tympanogram on the left.  The word recognition score is 96% on the right and 88% on the left.    CT scan  I reviewed the CT, both the images and the report, which was concerning for left scutum erosion with opacification of the attic and mastoid, which is concerning for  cholesteatoma.  It appears that there is incus erosion on both sides.    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  I discussed the risks of nonintervention, which include facial weakness, meningitis, brain abscess, labyrinthine fistula, worsened hearing loss.  I therefore recommend left tympanoplasty with mastoidectomy, possible cartilage graft, possible ossiculoplasty.  I discussed that I may elect to defer ossiculoplasty at the time of the first surgery depending on the extent of the cholesteatoma.  I then would plan for a second look surgery in 6 to 9 months to ensure that all the cholesteatoma was removed.  I would perform ossiculoplasty at that time.  I discussed the risks, benefits of the procedure including, but not limited to, bleeding, recurrence of cholesteatoma, tympanic membrane perforation, pain, infection, hearing loss, dizziness, change in taste, facial weakness, cerebrospinal fluid leak, need for further procedures.  The patient elected to proceed.  We will plan for left tympanoplasty, mastoidectomy, possible cartilage graft, possible ossiculoplasty.    Jamil Brizuela MD

## 2024-02-10 NOTE — PROGRESS NOTES
CHIEF COMPLAINT:   Chief Complaint   Patient presents with    Otitis Media       HISTORY OF PRESENT ILLNESS: Jamil Cisse is a 54 y.o. male who presents today with symptoms of left ear drainage and hearing loss.  He has a history of multiple surgeries on the left ear, most recently 8 years ago.  He reports that all of his surgeries have been through the ear canal.  He denies any postauricular incisions.  He says that he also had a history of tubes.  He also reports having a history of significant ear infections throughout his life, particularly in the left ear.  He also reports that he has a perforation in the right ear, but he does not have significant issues in the right side.    PAST MEDICAL HISTORY:   Past Medical History:   Diagnosis Date    COVID-19 virus infection 03/24/2023    Personal history of infections of the central nervous system 02/2018    History of Herpes Encephalitis    Personal history of other endocrine, nutritional and metabolic disease 08/05/2020    History of hypercalcemia    Personal history of other infectious and parasitic diseases 02/2018    History of herpes encephalitis    Personal history of renal cancer 2005    History of other malignant neoplasm of RIGHT kidney       PAST SURGICAL HISTORY:   Past Surgical History:   Procedure Laterality Date    APPENDECTOMY  03/22/2018    Appendectomy    NEPHRECTOMY Right 2005    Nephrectomy    OTHER SURGICAL HISTORY  05/20/2020    Shoulder surgery    OTHER SURGICAL HISTORY  05/20/2020    Vasectomy    OTHER SURGICAL HISTORY  07/09/2020    Ear surgery    OTHER SURGICAL HISTORY  07/09/2020    Finger surgical procedure    OTHER SURGICAL HISTORY  07/09/2020    Throat surgery    ROTATOR CUFF REPAIR Right 09/23/2022    Right rotator cuff repair with subacromial decompression    TOTAL HIP ARTHROPLASTY Right 05/2017    Hip replacement    TOTAL HIP ARTHROPLASTY Left 06/08/2020       MEDICATIONS:   Current Outpatient Medications:     albuterol (Ventolin  HFA) 90 mcg/actuation inhaler, Inhale 2 puffs every 6 hours if needed for wheezing., Disp: 18 g, Rfl: 3    aspirin 81 mg EC tablet, Take 1 tablet (81 mg) by mouth once daily., Disp: 90 tablet, Rfl: 0    aspirin-acetaminophen-caffeine (Excedrin Extra Strength) 250-250-65 mg tablet, Take 1 tablet by mouth every 6 hours if needed., Disp: , Rfl:     atorvastatin (Lipitor) 20 mg tablet, Take 1 tablet (20 mg) by mouth once daily., Disp: 90 tablet, Rfl: 0    certolizumab pegol (Cimzia Powder for Reconst) injection, Inject 1 mL (200 mg) under the skin., Disp: , Rfl:     cetirizine (ZyrTEC) 10 mg tablet, Take by mouth once daily., Disp: , Rfl:     colchicine 0.6 mg tablet, Take 1 tablet (0.6 mg) by mouth once daily., Disp: 90 tablet, Rfl: 0    febuxostat (Uloric) 40 mg tablet, Take 1 tablet (40 mg) by mouth once daily., Disp: 90 tablet, Rfl: 0    fexofenadine (Allegra) 180 mg tablet, Take 1 tablet (180 mg) by mouth once daily., Disp: , Rfl:     fluticasone (Flonase) 50 mcg/actuation nasal spray, Administer 2 sprays into each nostril once daily., Disp: 48 g, Rfl: 0    montelukast (Singulair) 10 mg tablet, Take 1 tablet (10 mg) by mouth once daily at bedtime., Disp: , Rfl:     omeprazole (PriLOSEC) 40 mg DR capsule, Take 1 capsule (40 mg) by mouth once daily., Disp: 90 capsule, Rfl: 0    telmisartan-hydrochlorothiazid (MIcarDIS HCT) 80-25 mg tablet, Take 1 tablet by mouth once daily., Disp: 90 tablet, Rfl: 0    Xiidra 5 % dropperette, INSTILL 1 DROP IN BOTH EYES TWICE A DAY, Disp: , Rfl:     Maxalt-MLT 10 mg disintegrating tablet, Take 1 tablet (10 mg) by mouth 1 time if needed for migraine. May repeat in 2 hours if unresolved. Do not exceed 30 mg in 24 hours. (Patient not taking: Reported on 1/4/2024), Disp: 30 tablet, Rfl: 0    ALLERGIES:   Allergies   Allergen Reactions    Adhesive Tape-Silicones Unknown and Other     skin pulled off    Codeine Unknown    Latex Other     Added based on information entered during log entry,  "please review and add reactions, type, and severity as needed.  PT DENIES ALLERGY    Sulfa (Sulfonamide Antibiotics) Other    Adhesive Other and Unknown     Pulls skin off    Amlodipine Besylate Rash     Flush    Lisinopril Other     ineffective    Sumatriptan Other     ineffective    Valsartan Other     ineffective       SOCIAL HISTORY:   reports that he has quit smoking. His smoking use included cigarettes. He has a 6.00 pack-year smoking history. He has never used smokeless tobacco. He reports current alcohol use of about 4.0 standard drinks of alcohol per week. He reports that he does not use drugs.    PHYSICAL EXAM:    VITALS:   Vitals:    02/09/24 1148   BP: 122/76   BP Location: Left arm   Patient Position: Sitting   BP Cuff Size: Large adult long   Pulse: 87   Temp: 36.8 °C (98.2 °F)   TempSrc: Temporal   Weight: 101 kg (223 lb)   Height: 1.753 m (5' 9\")        GENERAL: healthy, alert, well developed, well nourished, no distress, cooperative, appears chronologic age    Communicates with normal voice and without hearing aids.    HEAD: atraumatic, normocephalic, no lesions    EYES: normal, PERRLA and EOM's intact    EARS:   Right ear demonstrates a patent external auditory canal with a tympanic membrane 10% posterior superior perforation.  I think I am appreciating an eroded long process of the incus through the perforation.  The perforation is clean and dry.  Left ear: The left ear canal was patent.  The tympanic membrane was significantly retracted, particular in the posterior superior region.  There also was apparent attic retraction and debris in the posterior superior attic.  There was also some drainage in the ear canal coming from this debris in the posterior superior quadrant.  Mirza tuning fork test lateralizes lateralizes to the left 512 Hz.   Rinne testing with a 512 Hz tuning fork: Right Air conduction > Bone conduction   Left Bone conduction > Air conduction      NOSE: nose shows no deformity, " asymmetry, or inflammation, nasal mucosa normal,     ORAL CAVITY/OROPHARYNX: negative findings: lips normal without lesions, buccal mucosa normal, gums healthy, teeth intact, non-carious, palate normal, tongue midline and normal, soft palate, uvula, and tonsils normal    NECK AND SALIVARY GLANDS: Neck is supple without masses or lymphadenopathy. Palpation of the parotid and submandibular gland reveals no masses or tenderness to palpation.    CRANIAL NERVES: intact,   Facial nerve exam  is House - Brackmann 1- Normal Function on the right and 1- Normal Function on the left.    CARDIOVASCULAR: No evidence of peripheral edema    PULMONARY: normal lung excursion, no evidence of retractions    DATA REVIEWED:  Audiogram  I reviewed the audiogram from 12/20/2023, which demonstrates right moderate rising to normal hearing, then sloping to moderate mixed hearing loss.  On the left, there is moderate sloping to severe mixed hearing loss.  There is a type B tympanogram on the right and a type a tympanogram on the left.  The word recognition score is 96% on the right and 88% on the left.    CT scan  I reviewed the CT, both the images and the report, which was concerning for left scutum erosion with opacification of the attic and mastoid, which is concerning for cholesteatoma.  It appears that there is incus erosion on both sides.    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  I discussed the risks of nonintervention, which include facial weakness, meningitis, brain abscess, labyrinthine fistula, worsened hearing loss.  I therefore recommend left tympanoplasty with mastoidectomy, possible cartilage graft, possible ossiculoplasty.  I discussed that I may elect to defer ossiculoplasty at the time of the first surgery depending on the extent of the  cholesteatoma.  I then would plan for a second look surgery in 6 to 9 months to ensure that all the cholesteatoma was removed.  I would perform ossiculoplasty at that time.  I discussed the risks, benefits of the procedure including, but not limited to, bleeding, recurrence of cholesteatoma, tympanic membrane perforation, pain, infection, hearing loss, dizziness, change in taste, facial weakness, cerebrospinal fluid leak, need for further procedures.  The patient elected to proceed.  We will plan for left tympanoplasty, mastoidectomy, possible cartilage graft, possible ossiculoplasty.    Jamil Brizuela MD

## 2024-02-13 ENCOUNTER — CLINICAL SUPPORT (OUTPATIENT)
Dept: ALLERGY | Facility: CLINIC | Age: 54
End: 2024-02-13
Payer: COMMERCIAL

## 2024-02-13 ENCOUNTER — HOSPITAL ENCOUNTER (OUTPATIENT)
Facility: HOSPITAL | Age: 54
Setting detail: OUTPATIENT SURGERY
End: 2024-02-13
Attending: STUDENT IN AN ORGANIZED HEALTH CARE EDUCATION/TRAINING PROGRAM | Admitting: STUDENT IN AN ORGANIZED HEALTH CARE EDUCATION/TRAINING PROGRAM
Payer: COMMERCIAL

## 2024-02-13 DIAGNOSIS — Z01.818 PRE-OP TESTING: ICD-10-CM

## 2024-02-13 DIAGNOSIS — H71.20 CHOLESTEATOMA OF MIDDLE EAR AND MASTOID, UNSPECIFIED LATERALITY: ICD-10-CM

## 2024-02-13 DIAGNOSIS — J30.1 SEASONAL ALLERGIC RHINITIS DUE TO POLLEN: ICD-10-CM

## 2024-02-13 PROCEDURE — 95117 IMMUNOTHERAPY INJECTIONS: CPT | Performed by: ALLERGY & IMMUNOLOGY

## 2024-02-14 ENCOUNTER — TELEPHONE (OUTPATIENT)
Dept: OTOLARYNGOLOGY | Facility: CLINIC | Age: 54
End: 2024-02-14
Payer: COMMERCIAL

## 2024-02-14 NOTE — TELEPHONE ENCOUNTER
Returned patient's call at [phone] to discuss patient's request to proceed with surgery on 3/22. Left voice message to provide confirmation of surgery scheduled for 3/22.

## 2024-02-20 ENCOUNTER — HOSPITAL ENCOUNTER (OUTPATIENT)
Dept: RADIOLOGY | Facility: CLINIC | Age: 54
Discharge: HOME | End: 2024-02-20
Payer: COMMERCIAL

## 2024-02-20 ENCOUNTER — CLINICAL SUPPORT (OUTPATIENT)
Dept: ALLERGY | Facility: CLINIC | Age: 54
End: 2024-02-20
Payer: COMMERCIAL

## 2024-02-20 DIAGNOSIS — J98.4 RESTRICTIVE LUNG DISEASE: ICD-10-CM

## 2024-02-20 DIAGNOSIS — R06.02 SOB (SHORTNESS OF BREATH): ICD-10-CM

## 2024-02-20 DIAGNOSIS — J30.1 SEASONAL ALLERGIC RHINITIS DUE TO POLLEN: ICD-10-CM

## 2024-02-20 PROCEDURE — 71250 CT THORAX DX C-: CPT | Performed by: STUDENT IN AN ORGANIZED HEALTH CARE EDUCATION/TRAINING PROGRAM

## 2024-02-20 PROCEDURE — 71250 CT THORAX DX C-: CPT

## 2024-02-20 PROCEDURE — 95117 IMMUNOTHERAPY INJECTIONS: CPT | Performed by: ALLERGY & IMMUNOLOGY

## 2024-02-26 ENCOUNTER — OFFICE VISIT (OUTPATIENT)
Dept: PRIMARY CARE | Facility: CLINIC | Age: 54
End: 2024-02-26
Payer: COMMERCIAL

## 2024-02-26 ENCOUNTER — TELEPHONE (OUTPATIENT)
Dept: PRIMARY CARE | Facility: CLINIC | Age: 54
End: 2024-02-26

## 2024-02-26 VITALS
TEMPERATURE: 98.6 F | BODY MASS INDEX: 34.21 KG/M2 | WEIGHT: 231 LBS | OXYGEN SATURATION: 94 % | HEIGHT: 69 IN | SYSTOLIC BLOOD PRESSURE: 136 MMHG | HEART RATE: 100 BPM | DIASTOLIC BLOOD PRESSURE: 88 MMHG

## 2024-02-26 DIAGNOSIS — N28.89 LEFT KIDNEY MASS: ICD-10-CM

## 2024-02-26 DIAGNOSIS — R91.1 NODULE OF RIGHT LUNG: ICD-10-CM

## 2024-02-26 DIAGNOSIS — I71.21 ANEURYSM OF ASCENDING AORTA WITHOUT RUPTURE (CMS-HCC): Primary | ICD-10-CM

## 2024-02-26 PROCEDURE — 3008F BODY MASS INDEX DOCD: CPT | Performed by: FAMILY MEDICINE

## 2024-02-26 PROCEDURE — 99214 OFFICE O/P EST MOD 30 MIN: CPT | Performed by: FAMILY MEDICINE

## 2024-02-26 PROCEDURE — 3075F SYST BP GE 130 - 139MM HG: CPT | Performed by: FAMILY MEDICINE

## 2024-02-26 PROCEDURE — 3079F DIAST BP 80-89 MM HG: CPT | Performed by: FAMILY MEDICINE

## 2024-02-26 PROCEDURE — 1036F TOBACCO NON-USER: CPT | Performed by: FAMILY MEDICINE

## 2024-02-26 ASSESSMENT — PATIENT HEALTH QUESTIONNAIRE - PHQ9
SUM OF ALL RESPONSES TO PHQ9 QUESTIONS 1 AND 2: 0
2. FEELING DOWN, DEPRESSED OR HOPELESS: NOT AT ALL
1. LITTLE INTEREST OR PLEASURE IN DOING THINGS: NOT AT ALL

## 2024-02-26 NOTE — PROGRESS NOTES
"Subjective   Patient ID: Mau Cisse is a 54 y.o. male who presents for Follow-up on recent testing he had.      HPI     Patient states he would like to discuss his CT scan results.  Recently undergoing evaluation with pulmonology for complaints of SOB especially with activity.  As part of his evaluation, he had a CT scan of the chest done that revealed several abnormal findings:  IMPRESSION:  Nonspecific 1.9 cm right upper lobe part solid nodule with  mediastinal lymphadenopathy. Consider further evaluation with PET-CT.      Ascending thoracic aortic aneurysm, 4.3 cm.      Inadequately characterized 2.6 cm left renal mass. Consider further  evaluation with renal ultrasound.    He has a hx of renal CA and underwent right nephrectomy in the past.                Review of Systems  Constitutional: Patient denies any fever, chills, loss of appetite, or unexplained weight loss.    Cardiovascular: Patient denies any chest pain,  tachycardia, palpitations, orthopnea, or paroxysmal nocturnal dyspnea.  He does note some SOB with activity.    Respiratory: Patient denies any cough, shortness breath at rest, or wheezing.  He has noted some SOB with exertional activity.    Gastrointestinal: Patient denies any nausea, vomiting, diarrhea, constipation, melena, hematochezia, or reflux symptoms.  Skin: Denies any rashes or skin lesions.   Neurology: Patient denies any new motor or sensory losses.  Denies any numbness, tingling, weakness, and incoordination of the extremities.  Patient also denies any tremor, seizures, or gait instability.  Endocrinology: Denies any polyuria, polydipsia, polyphagia, or heat/cold intolerance.      Objective   /88   Pulse 100   Temp 37 °C (98.6 °F)   Ht 1.753 m (5' 9\")   Wt 105 kg (231 lb)   SpO2 94%   BMI 34.11 kg/m²     Physical Exam  General Appearance: Alert and cooperative, in no acute distress, well-developed/well-nourished.  Neck: Supple and without adenopathy or rigidity.  There " "is no JVD at 90° and no carotid bruits are noted.  There is no thyromegaly, thyroid tenderness, or palpable thyroid nodules.  Heart: Regular rate and rhythm without murmur or ectopy.  Respiratory: Lungs are clear to auscultation bilaterally with good air exchange.  Good respiratory effort and no accessory muscle use.  Skin: Good turgor, moist, warm and without rashes or lesions.  Neurological exam: Alert and oriented ×3, no tremor, normal gait.  Extremities: No clubbing, cyanosis, or edema      Assessment/Plan   1. Aneurysm of ascending aorta without rupture (CMS/HCC)  Will order ECHO and will likely need referral to cardiac specialist for monitoring.    2. Nodule of right lung  Will order PET scan for evaluation.  - NM PET CT FDG wholebody; Future    3. Left kidney mass  CT of lungs noted a \"mass\" in the left kidney in a patient with a hx of left renal CA requiring left nephrectomy.  Will check a renal ultrasound and determine further follow up.  PET scan will also provide additional information.  - US renal complete; Future  - NM PET CT FDG wholebody; Future         "

## 2024-02-26 NOTE — TELEPHONE ENCOUNTER
Advise pt that we can see him at the end of the day.  Can schedule at 5:40 but advise pt that it will be a little later than that by the time we finish up the last patients.

## 2024-02-26 NOTE — TELEPHONE ENCOUNTER
Pt requesting an apt asap due to his recent CT. I'm not sure if you have received his results. Please advise.

## 2024-02-27 ENCOUNTER — CLINICAL SUPPORT (OUTPATIENT)
Dept: ALLERGY | Facility: CLINIC | Age: 54
End: 2024-02-27
Payer: COMMERCIAL

## 2024-02-27 DIAGNOSIS — J30.1 SEASONAL ALLERGIC RHINITIS DUE TO POLLEN: ICD-10-CM

## 2024-02-27 PROCEDURE — 95117 IMMUNOTHERAPY INJECTIONS: CPT | Performed by: ALLERGY & IMMUNOLOGY

## 2024-02-27 NOTE — PATIENT INSTRUCTIONS
Schedule testing as we discussed.    It was a pleasure to see you today. Thank you for choosing us for your health care needs.    If you have lab or other testing completed and have not been informed of results within one week, please call the office for your results.    If you haven't done so, consider signing up for [facility] [email or web address], the [facility] personal health record. Ask the staff how you can get started.

## 2024-02-28 ENCOUNTER — DOCUMENTATION (OUTPATIENT)
Dept: PULMONOLOGY | Facility: CLINIC | Age: 54
End: 2024-02-28
Payer: COMMERCIAL

## 2024-02-28 DIAGNOSIS — J98.4 RESTRICTIVE LUNG DISEASE: Primary | ICD-10-CM

## 2024-02-28 NOTE — PROGRESS NOTES
Spoke with Mr StrongBetito about CT Chest results. Will get PET/CT and ordering ILD/Autoimmune labs and new PFTs.

## 2024-02-29 ENCOUNTER — HOSPITAL ENCOUNTER (OUTPATIENT)
Dept: RADIOLOGY | Facility: HOSPITAL | Age: 54
Discharge: HOME | End: 2024-02-29
Payer: COMMERCIAL

## 2024-02-29 ENCOUNTER — HOSPITAL ENCOUNTER (OUTPATIENT)
Dept: RESPIRATORY THERAPY | Facility: HOSPITAL | Age: 54
Discharge: HOME | End: 2024-02-29
Payer: COMMERCIAL

## 2024-02-29 ENCOUNTER — ANCILLARY PROCEDURE (OUTPATIENT)
Dept: CARDIOLOGY | Facility: CLINIC | Age: 54
End: 2024-02-29
Payer: COMMERCIAL

## 2024-02-29 ENCOUNTER — LAB (OUTPATIENT)
Dept: LAB | Facility: LAB | Age: 54
End: 2024-02-29
Payer: COMMERCIAL

## 2024-02-29 DIAGNOSIS — J98.4 RESTRICTIVE LUNG DISEASE: ICD-10-CM

## 2024-02-29 DIAGNOSIS — N28.89 LEFT KIDNEY MASS: ICD-10-CM

## 2024-02-29 DIAGNOSIS — R06.02 SOB (SHORTNESS OF BREATH): ICD-10-CM

## 2024-02-29 LAB
AORTIC VALVE MEAN GRADIENT: 18 MMHG
AORTIC VALVE PEAK VELOCITY: 2.56 M/S
AV PEAK GRADIENT: 26.2 MMHG
AVA (PEAK VEL): 1.26 CM2
AVA (VTI): 1.42 CM2
CCP IGG SERPL-ACNC: <1 U/ML
CENTROMERE B AB SER-ACNC: <0.2 AI
CHROMATIN AB SERPL-ACNC: <0.2 AI
DSDNA AB SER-ACNC: 2 IU/ML
EJECTION FRACTION APICAL 4 CHAMBER: 51.3
EJECTION FRACTION: 53 %
ENA JO1 AB SER QL IA: <0.2 AI
ENA RNP AB SER IA-ACNC: 0.2 AI
ENA SCL70 AB SER QL IA: <0.2 AI
ENA SM AB SER IA-ACNC: <0.2 AI
ENA SM+RNP AB SER QL IA: <0.2 AI
ENA SS-A AB SER IA-ACNC: <0.2 AI
ENA SS-B AB SER IA-ACNC: <0.2 AI
LEFT VENTRICLE INTERNAL DIMENSION DIASTOLE: 5 CM (ref 3.5–6)
LEFT VENTRICULAR OUTFLOW TRACT DIAMETER: 2 CM
MGC ASCENT PFT - FEV1 - POST: 3.43
MGC ASCENT PFT - FEV1 - PRE: 3.57
MGC ASCENT PFT - FEV1 - PREDICTED: 3.46
MGC ASCENT PFT - FVC - POST: 4.2
MGC ASCENT PFT - FVC - PRE: 4.41
MGC ASCENT PFT - FVC - PREDICTED: 4.36
MITRAL VALVE E/A RATIO: 0.94
MITRAL VALVE E/E' RATIO: 8.4
RHEUMATOID FACT SER NEPH-ACNC: <10 IU/ML (ref 0–15)
RIBOSOMAL P AB SER-ACNC: <0.2 AI

## 2024-02-29 PROCEDURE — 86606 ASPERGILLUS ANTIBODY: CPT

## 2024-02-29 PROCEDURE — 83516 IMMUNOASSAY NONANTIBODY: CPT

## 2024-02-29 PROCEDURE — 76770 US EXAM ABDO BACK WALL COMP: CPT

## 2024-02-29 PROCEDURE — 86200 CCP ANTIBODY: CPT

## 2024-02-29 PROCEDURE — 86036 ANCA SCREEN EACH ANTIBODY: CPT

## 2024-02-29 PROCEDURE — 86331 IMMUNODIFFUSION OUCHTERLONY: CPT

## 2024-02-29 PROCEDURE — 93306 TTE W/DOPPLER COMPLETE: CPT

## 2024-02-29 PROCEDURE — 86235 NUCLEAR ANTIGEN ANTIBODY: CPT

## 2024-02-29 PROCEDURE — 76770 US EXAM ABDO BACK WALL COMP: CPT | Performed by: RADIOLOGY

## 2024-02-29 PROCEDURE — 82085 ASSAY OF ALDOLASE: CPT

## 2024-02-29 PROCEDURE — 84182 PROTEIN WESTERN BLOT TEST: CPT

## 2024-02-29 PROCEDURE — 86431 RHEUMATOID FACTOR QUANT: CPT

## 2024-02-29 PROCEDURE — 86225 DNA ANTIBODY NATIVE: CPT

## 2024-02-29 PROCEDURE — 86038 ANTINUCLEAR ANTIBODIES: CPT

## 2024-02-29 PROCEDURE — 36415 COLL VENOUS BLD VENIPUNCTURE: CPT

## 2024-02-29 PROCEDURE — 94726 PLETHYSMOGRAPHY LUNG VOLUMES: CPT

## 2024-02-29 PROCEDURE — 93306 TTE W/DOPPLER COMPLETE: CPT | Performed by: INTERNAL MEDICINE

## 2024-03-02 LAB — ALDOLASE SERPL-CCNC: 5.7 U/L (ref 1.2–7.6)

## 2024-03-03 LAB — ANA SER QL HEP2 SUBST: NEGATIVE

## 2024-03-05 ENCOUNTER — CLINICAL SUPPORT (OUTPATIENT)
Dept: ALLERGY | Facility: CLINIC | Age: 54
End: 2024-03-05
Payer: COMMERCIAL

## 2024-03-05 DIAGNOSIS — I10 HTN (HYPERTENSION), BENIGN: ICD-10-CM

## 2024-03-05 DIAGNOSIS — J30.1 SEASONAL ALLERGIC RHINITIS DUE TO POLLEN: ICD-10-CM

## 2024-03-05 PROBLEM — I51.7 LEFT VENTRICULAR HYPERTROPHY: Status: ACTIVE | Noted: 2024-03-05

## 2024-03-05 PROBLEM — Q23.1 BICUSPID AORTIC VALVE (HHS-HCC): Status: ACTIVE | Noted: 2024-03-05

## 2024-03-05 PROBLEM — Q23.81 BICUSPID AORTIC VALVE: Status: ACTIVE | Noted: 2024-03-05

## 2024-03-05 LAB
ANCA AB PATTERN SER IF-IMP: NORMAL
ANCA IGG TITR SER IF: NORMAL {TITER}
MYELOPEROXIDASE AB SER-ACNC: 0 AU/ML (ref 0–19)
PROTEINASE3 AB SER-ACNC: 4 AU/ML (ref 0–19)

## 2024-03-05 PROCEDURE — 95117 IMMUNOTHERAPY INJECTIONS: CPT | Performed by: ALLERGY & IMMUNOLOGY

## 2024-03-06 ENCOUNTER — APPOINTMENT (OUTPATIENT)
Dept: PULMONOLOGY | Facility: CLINIC | Age: 54
End: 2024-03-06
Payer: COMMERCIAL

## 2024-03-06 RX ORDER — TELMISARTAN AND HYDROCHLORTHIAZIDE 80; 25 MG/1; MG/1
1 TABLET ORAL DAILY
Qty: 90 TABLET | Refills: 0 | Status: SHIPPED | OUTPATIENT
Start: 2024-03-06 | End: 2024-06-03 | Stop reason: SDUPTHER

## 2024-03-06 RX ORDER — ASPIRIN 81 MG/1
81 TABLET ORAL DAILY
Qty: 90 TABLET | Refills: 0 | Status: SHIPPED | OUTPATIENT
Start: 2024-03-06 | End: 2024-06-03 | Stop reason: SDUPTHER

## 2024-03-07 ENCOUNTER — TELEPHONE (OUTPATIENT)
Dept: PRIMARY CARE | Facility: CLINIC | Age: 54
End: 2024-03-07
Payer: COMMERCIAL

## 2024-03-07 LAB
A FUMIGATUS1 AB SER QL ID: NORMAL
A FUMIGATUS6 AB SER QL ID: NORMAL
A PULLULANS AB SER QL ID: NORMAL
PIGEON SERUM AB QL ID: NORMAL
S RECTIVIRGULA AB SER QL ID: NORMAL

## 2024-03-07 NOTE — TELEPHONE ENCOUNTER
Pt called stating that he received a denial letter for his PET CT and says he spoke with his insurance they needed a alternative code for it to be approved. The insurance told him they have sent you an email in regards of this. Please advise.

## 2024-03-08 ENCOUNTER — PRE-ADMISSION TESTING (OUTPATIENT)
Dept: PREADMISSION TESTING | Facility: HOSPITAL | Age: 54
End: 2024-03-08
Payer: COMMERCIAL

## 2024-03-08 ENCOUNTER — HOSPITAL ENCOUNTER (OUTPATIENT)
Dept: CARDIOLOGY | Facility: HOSPITAL | Age: 54
Discharge: HOME | End: 2024-03-08
Payer: COMMERCIAL

## 2024-03-08 VITALS
HEIGHT: 69 IN | SYSTOLIC BLOOD PRESSURE: 148 MMHG | OXYGEN SATURATION: 95 % | BODY MASS INDEX: 34.32 KG/M2 | WEIGHT: 231.7 LBS | TEMPERATURE: 97.8 F | HEART RATE: 87 BPM | DIASTOLIC BLOOD PRESSURE: 91 MMHG

## 2024-03-08 DIAGNOSIS — Z01.818 PRE-OP TESTING: ICD-10-CM

## 2024-03-08 DIAGNOSIS — N28.89 LEFT KIDNEY MASS: ICD-10-CM

## 2024-03-08 DIAGNOSIS — R91.1 NODULE OF RIGHT LUNG: Primary | ICD-10-CM

## 2024-03-08 LAB
ABO GROUP (TYPE) IN BLOOD: NORMAL
ANA SER QL: NEGATIVE
ANION GAP SERPL CALC-SCNC: 16 MMOL/L (ref 10–20)
ANNOTATION COMMENT IMP: NORMAL
ANTIBODY SCREEN: NORMAL
BUN SERPL-MCNC: 21 MG/DL (ref 6–23)
CALCIUM SERPL-MCNC: 9.8 MG/DL (ref 8.6–10.6)
CHLORIDE SERPL-SCNC: 102 MMOL/L (ref 98–107)
CO2 SERPL-SCNC: 26 MMOL/L (ref 21–32)
CREAT SERPL-MCNC: 1.48 MG/DL (ref 0.5–1.3)
EGFRCR SERPLBLD CKD-EPI 2021: 56 ML/MIN/1.73M*2
EJ AB SER QL: NEGATIVE
ENA JO1 IGG SER-ACNC: 1 AU/ML (ref 0–40)
ENA SS-A 60KD AB SER-ACNC: 0 AU/ML (ref 0–40)
ENA SS-A IGG SER QL: 4 AU/ML (ref 0–40)
ERYTHROCYTE [DISTWIDTH] IN BLOOD BY AUTOMATED COUNT: 17.1 % (ref 11.5–14.5)
FIBRILLARIN AB SER QL: NEGATIVE
GLUCOSE SERPL-MCNC: 73 MG/DL (ref 74–99)
HCT VFR BLD AUTO: 46.7 % (ref 41–52)
HGB BLD-MCNC: 14.5 G/DL (ref 13.5–17.5)
KU AB SER QL: NEGATIVE
MCH RBC QN AUTO: 24.4 PG (ref 26–34)
MCHC RBC AUTO-ENTMCNC: 31 G/DL (ref 32–36)
MCV RBC AUTO: 79 FL (ref 80–100)
MDA5 AB SER QL LINE BLOT: NEGATIVE
MI2 AB SER QL: NEGATIVE
MJ AB SER QL LINE BLOT: NEGATIVE
NRBC BLD-RTO: 0 /100 WBCS (ref 0–0)
OJ AB SER QL: NEGATIVE
PL12 AB SER QL: NEGATIVE
PL7 AB SER QL: NEGATIVE
PLATELET # BLD AUTO: 294 X10*3/UL (ref 150–450)
PM/SCL-100 AB SER QL LINE BLOT: NEGATIVE
POTASSIUM SERPL-SCNC: 4.1 MMOL/L (ref 3.5–5.3)
RBC # BLD AUTO: 5.94 X10*6/UL (ref 4.5–5.9)
RH FACTOR (ANTIGEN D): NORMAL
SAE1 AB SER QL LINE BLOT: NEGATIVE
SODIUM SERPL-SCNC: 140 MMOL/L (ref 136–145)
SRP AB SERPL QL: NEGATIVE
TIF1-GAMMA AB SER QL LINE BLOT: NEGATIVE
U1 SNRNP IGG SER-ACNC: 2 UNITS (ref 0–19)
WBC # BLD AUTO: 7.1 X10*3/UL (ref 4.4–11.3)

## 2024-03-08 PROCEDURE — 86901 BLOOD TYPING SEROLOGIC RH(D): CPT

## 2024-03-08 PROCEDURE — 93005 ELECTROCARDIOGRAM TRACING: CPT

## 2024-03-08 PROCEDURE — 99204 OFFICE O/P NEW MOD 45 MIN: CPT | Performed by: NURSE PRACTITIONER

## 2024-03-08 PROCEDURE — 36415 COLL VENOUS BLD VENIPUNCTURE: CPT

## 2024-03-08 PROCEDURE — 93010 ELECTROCARDIOGRAM REPORT: CPT | Performed by: INTERNAL MEDICINE

## 2024-03-08 PROCEDURE — 85027 COMPLETE CBC AUTOMATED: CPT

## 2024-03-08 PROCEDURE — 82374 ASSAY BLOOD CARBON DIOXIDE: CPT

## 2024-03-08 ASSESSMENT — DUKE ACTIVITY SCORE INDEX (DASI)
DASI METS SCORE: 9
CAN YOU TAKE CARE OF YOURSELF (EAT, DRESS, BATHE, OR USE TOILET): YES
TOTAL_SCORE: 50.7
CAN YOU HAVE SEXUAL RELATIONS: YES
CAN YOU WALK A BLOCK OR TWO ON LEVEL GROUND: YES
CAN YOU DO MODERATE WORK AROUND THE HOUSE LIKE VACUUMING, SWEEPING FLOORS OR CARRYING GROCERIES: YES
CAN YOU DO LIGHT WORK AROUND THE HOUSE LIKE DUSTING OR WASHING DISHES: YES
CAN YOU DO YARD WORK LIKE RAKING LEAVES, WEEDING OR PUSHING A MOWER: YES
CAN YOU PARTICIPATE IN MODERATE RECREATIONAL ACTIVITIES LIKE GOLF, BOWLING, DANCING, DOUBLES TENNIS OR THROWING A BASEBALL OR FOOTBALL: YES
CAN YOU PARTICIPATE IN STRENOUS SPORTS LIKE SWIMMING, SINGLES TENNIS, FOOTBALL, BASKETBALL, OR SKIING: NO
CAN YOU DO HEAVY WORK AROUND THE HOUSE LIKE SCRUBBING FLOORS OR LIFTING AND MOVING HEAVY FURNITURE: YES
CAN YOU CLIMB A FLIGHT OF STAIRS OR WALK UP A HILL: YES
CAN YOU RUN A SHORT DISTANCE: YES
CAN YOU WALK INDOORS, SUCH AS AROUND YOUR HOUSE: YES

## 2024-03-08 ASSESSMENT — ENCOUNTER SYMPTOMS: SHORTNESS OF BREATH: 1

## 2024-03-08 ASSESSMENT — CHADS2 SCORE
CHF: NO
PRIOR STROKE OR TIA OR THROMBOEMBOLISM: NO
DIABETES: NO
AGE GREATER THAN OR EQUAL TO 75: NO
CHADS2 SCORE: 1
HYPERTENSION: YES

## 2024-03-08 ASSESSMENT — LIFESTYLE VARIABLES: SMOKING_STATUS: NONSMOKER

## 2024-03-08 NOTE — PREPROCEDURE INSTRUCTIONS
Fasting Guidelines    NPO Instructions:    Do not eat any food after midnight the night before your surgery/procedure.  You may have up to TEN ounces of clear liquids until TWO hours before your instructed arrival time to the hospital. This includes water, black tea/coffee, (no milk or cream), apple juice, and/or electrolyte drinks (Gatorade).  You may chew gum up to TWO hours before your surgery/procedure.    Avoid herbal supplements, multivitamins and NSAIDS (non-steroidal anti-inflammatory drugs) such as Advil, Aleve, Ibuprofen, Naproxen, Excedrin, Meloxicam or Celebrex for at least 7 days prior to surgery. May take Tylenol as needed.    Avoid tobacco and alcohol products for 24 hours prior to surgery.    CONTACT SURGEON'S OFFICE IF YOU DEVELOP:  * Fever = 100.4 F   * New respiratory symptoms (e.g. cough, shortness of breath, respiratory distress, sore throat)  * Recent loss of taste or smell  *Flu like symptoms such as headache, fatigue or gastrointestinal symptoms  * You develop any open sores, shingles, burning or painful urination   AND/OR:  * You no longer wish to have the surgery.  * Any other personal circumstances change that may lead to the need to cancel or defer this surgery.  *You were admitted to any hospital within one week of your planned procedure.    Seven/Six Days before Surgery:  Review your medication instructions, stop indicated medications    Day of Surgery:  Review your medication instructions, take indicated medications  Wear comfortable loose fitting clothing  Do not use moisturizers, creams, lotions or perfume  All jewelry and valuables should be left at home    [facility]  [facility]  [phone]  [phone]  [email or web address]    [facility]  [phone]       Preoperative Brain Exercises    What are brain exercises?  A brain exercise is any activity that engages your thinking  (cognitive) skills.    What types of activities are considered brain exercises?  Jigsaw puzzles, crossword puzzles, word jumble, memory games, word search, and many more.  Many can be found free online or on your phone via a mobile kelechi.    Why should I do brain exercises before my surgery?  More recent research has shown brain exercise before surgery can lower the risk of postoperative delirium (confusion) which can be especially important for older adults.  Patients who did brain exercises for 5 to 10 hours the days before surgery, cut their risk of postoperative delirium in half up to 1 week after surgery.         [facility]    Preoperative Deep Breathing Exercises    Why it is important to do deep breathing exercises before my surgery?  Deep breathing exercises strengthen your breathing muscles.  This helps you to recover after your surgery and decreases the chance of breathing complications.      How are the deep breathing exercises done?  Sit straight with your back supported.  Breathe in deeply and slowly through your nose. Your lower rib cage should expand and your abdomen may move forward.  Hold that breath for 3 to 5 seconds.  Breathe out through pursed lips, slowly and completely.  Rest and repeat 10 times every hour while awake.  Rest longer if you become dizzy or lightheaded.         Patient and Family Education             Ways You Can Help Prevent Blood Clots             This handout explains some simple things you can do to help prevent blood clots.      Blood clots are blockages that can form in the body's veins. When a blood clot forms in your deep veins, it may be called a deep vein thrombosis, or DVT for short. Blood clots can happen in any part of the body where blood flows, but they are most common in the arms and legs. If a piece of a blood clot breaks free and travels to the lungs, it is called a pulmonary embolus (PE). A PE can be a very serious problem.          Being in the hospital or having surgery can raise your chances of getting a blood clot because you may not be well enough to move around as much as you normally do.         Ways you can help prevent blood clots in the hospital         Wearing SCDs. SCDs stands for Sequential Compression Devices.   SCDs are special sleeves that wrap around your legs  They attach to a pump that fills them with air to gently squeeze your legs every few minutes.   This helps return the blood in your legs to your heart.   SCDs should only be taken off when walking or bathing.   SCDs may not be comfortable, but they can help save your life.               Wearing compression stockings - if your doctor orders them. These special snug fitting stockings gently squeeze your legs to help blood flow.       Walking. Walking helps move the blood in your legs.   If your doctor says it is ok, try walking the halls at least   5 times a day. Ask us to help you get up, so you don't fall.      Taking any blood thinning medicines your doctor orders.        Page 1 of 2     [facility]; 3/23   Ways you can help prevent blood clots at home       Wearing compression stockings - if your doctor orders them. ? Walking - to help move the blood in your legs.       Taking any blood thinning medicines your doctor orders.      Signs of a blood clot or PE      Tell your doctor or nurse know right away if you have of the problems listed below.    If you are at home, seek medical care right away. Call 911 for chest pain or problems breathing.               Signs of a blood clot (DVT) - such as pain,  swelling, redness or warmth in your arm or leg      Signs of a pulmonary embolism (PE) - such as chest     pain or feeling short of breath

## 2024-03-08 NOTE — TELEPHONE ENCOUNTER
Spoke to Violet.    Insurance will not cover the PET Scan Whole Body.  They have approve the PET scan from skull base to mid-thigh.    Case #: [id number]  Authorizaton #: [id number]

## 2024-03-08 NOTE — PROGRESS NOTES
Spoke to Violet by phone regarding PET SCAN.    PET Scan whole body was denied, but will cover PET Scan from skull kp to Mid-thigh.    Case #: [id number]  Authorizaton #: [id number]

## 2024-03-08 NOTE — CPM/PAT H&P
"CPM/PAT Evaluation       Name: Jamil Cisse (Jamil Cisse \"Mau\")  /Age: 1970/54 y.o.     Visit Type:   In-Person       Chief Complaint: hearing loss    HPI  The patient is a 54 year old male with complaints of left ear drainage and hearing loss. He has had multiple ear surgeries in the past. His most recent CT is concerning for left scutum erosion with opacification of the attic and mastoid, which is concerning for cholesteatoma.  It appears that there is incus erosion on both sides. He presents today for perioperative evaluation in anticipation of Tympanomastoidectomy - Left  Excision Cartilage Graft Donor Site Ear(psb) - Left on 3/22/24 with Dr. Brizuela.    Past Medical History:   Diagnosis Date    Abnormal ECG 2022    Normal sinus rhythm Nonspecific T wave abnormality    Arthritis     Cholesteatoma of middle ear and mastoid     GERD (gastroesophageal reflux disease)     Gout     Heart murmur     Echo scheduled for 24    History of PFTs 2023    Dyspnea; Mild restriction on PFTs    HL (hearing loss)     Hyperlipidemia     Hypertension     Lung nodules     Nonspecific 1.9 cm right upper lobe part solid nodule with mediastinal lymphadenopathy    Personal history of infections of the central nervous system 2018    History of Herpes Encephalitis    PONV (postoperative nausea and vomiting)     Reactive airway disease     Renal cancer (CMS/HCC)     Right kidney s/p Right Nephroecyomy    Renal mass, left     Inadequately characterized 2.6 cm left renal mass. Consider further evaluation with renal US    Shortness of breath     Sinusitis     Sleep apnea     Uses CPAP    Thoracic ascending aortic aneurysm (CMS/HCC) 2024    4.3cm per CT scan on 24    Ulcerative colitis (CMS/HCC)     Treated with Cimzia    Vision loss     Wears contacts       Past Surgical History:   Procedure Laterality Date    COLONOSCOPY      CT CHEST WO IV CONTRAST  2024    Nonspecific 1.9 cm right " upper lobe part solid nodule with mediastinal lymphadenopathy.Ascending thoracic aortic aneurysm, 4.3 cm.    HERNIA REPAIR      NEPHRECTOMY Right 2005    Right total nephrectomy    OTHER SURGICAL HISTORY      Left Shoulder surgery    OTHER SURGICAL HISTORY  07/09/2020    Ear surgery x3    OTHER SURGICAL HISTORY  07/09/2020    Finger surgical procedure    OTHER SURGICAL HISTORY  07/09/2020    Throat surgery    ROTATOR CUFF REPAIR Right 09/23/2022    Right rotator cuff repair with subacromial decompression    TOTAL HIP ARTHROPLASTY Bilateral 05/2017    Bilateral Hip replacement    US CAROTID DOPPLER LEFT  2018    NO HEMODYNAMICALLY SIGNIFICANT VASCULAR STENOSIS IN THE CAROTID AND VERTEBRAL ARTERIES IN THE NECK    VASECTOMY         Patient Sexual activity questions deferred to the physician.    Family History   Problem Relation Name Age of Onset    Hypertension Mother      Hypertension Father      Stroke Father         Allergies   Allergen Reactions    Adhesive Tape-Silicones Other and Unknown     skin pulled off    Codeine Unknown    Sulfa (Sulfonamide Antibiotics) Other    Latex Other     Added based on information entered during log entry, please review and add reactions, type, and severity as needed.  PT DENIES ALLERGY    Adhesive Other and Unknown     Pulls skin off    Amlodipine Besylate Rash     Flush    Lisinopril Other     ineffective    Sumatriptan Other     ineffective    Valsartan Other     ineffective       Prior to Admission medications    Medication Sig Start Date End Date Taking? Authorizing Provider   acetaminophen (Tylenol) 500 mg tablet Take 1 tablet (500 mg) by mouth every 6 hours if needed for mild pain (1 - 3).    Historical Provider, MD   albuterol (Ventolin HFA) 90 mcg/actuation inhaler Inhale 2 puffs every 6 hours if needed for wheezing. 1/4/24   Trish Ruiz APRN-CNP   aspirin 81 mg EC tablet Take 1 tablet (81 mg) by mouth once daily. 3/6/24   Jocelyne Farris PA-C    aspirin-acetaminophen-caffeine (Excedrin Extra Strength) 250-250-65 mg tablet Take 1 tablet by mouth every 6 hours if needed.    Historical Provider, MD   atorvastatin (Lipitor) 20 mg tablet Take 1 tablet (20 mg) by mouth once daily. 12/5/23   Leo Flores DO   certolizumab pegol (Cimzia Powder for Reconst) injection Inject 1 mL (200 mg) under the skin every 14 (fourteen) days. 6/26/18   Historical Provider, MD   cetirizine (ZyrTEC) 10 mg tablet Take by mouth once daily.    Historical Provider, MD   colchicine 0.6 mg tablet Take 1 tablet (0.6 mg) by mouth once daily. 1/8/24   Leo Flores DO   febuxostat (Uloric) 40 mg tablet Take 1 tablet (40 mg) by mouth once daily. 1/8/24   Leo Flores DO   fexofenadine (Allegra) 180 mg tablet Take 1 tablet (180 mg) by mouth once daily.    Historical Provider, MD   fluticasone (Flonase) 50 mcg/actuation nasal spray Administer 2 sprays into each nostril once daily. 2/1/24   Leo Flores DO   Maxalt-MLT 10 mg disintegrating tablet Take 1 tablet (10 mg) by mouth 1 time if needed for migraine. May repeat in 2 hours if unresolved. Do not exceed 30 mg in 24 hours.  Patient not taking: Reported on 1/4/2024 5/22/23 10/2/23  Jocelyne Farris PA-C   montelukast (Singulair) 10 mg tablet Take 1 tablet (10 mg) by mouth once daily at bedtime. 6/16/23   Historical Provider, MD   omeprazole (PriLOSEC) 40 mg DR capsule Take 1 capsule (40 mg) by mouth once daily. 12/18/23   Jocelyne Farris PA-C   telmisartan-hydrochlorothiazid (MIcarDIS HCT) 80-25 mg tablet Take 1 tablet by mouth once daily. 3/6/24   Jocelyne Farris PA-C   Xiidra 5 % dropperette INSTILL 1 DROP IN BOTH EYES TWICE A DAY    Historical Provider, MD   aspirin 81 mg EC tablet Take 1 tablet (81 mg) by mouth once daily. 12/5/23 3/5/24  Leo Flores DO   telmisartan-hydrochlorothiazid (MIcarDIS HCT) 80-25 mg tablet Take 1 tablet by mouth once daily. 12/5/23 3/5/24  Leo Flores DO        PAT ROS:   Constitutional:    Neuro/Psych:   Eyes:   Ears:    hearing loss  Nose:   Mouth:   Throat:   Neck:   Cardio:   Respiratory:    shortness of breath  Endocrine:   GI:   :   Musculoskeletal:   Hematologic:   Skin:      Physical Exam  Vitals reviewed.   Constitutional:       Appearance: Normal appearance.   HENT:      Head: Normocephalic and atraumatic.      Nose: Nose normal.      Mouth/Throat:      Mouth: Mucous membranes are moist.      Pharynx: Oropharynx is clear.   Eyes:      Extraocular Movements: Extraocular movements intact.      Pupils: Pupils are equal, round, and reactive to light.   Cardiovascular:      Rate and Rhythm: Normal rate and regular rhythm.      Pulses: Normal pulses.      Heart sounds: Normal heart sounds.   Pulmonary:      Effort: Pulmonary effort is normal.      Breath sounds: Normal breath sounds.   Musculoskeletal:         General: Normal range of motion.      Cervical back: Normal range of motion.   Skin:     General: Skin is warm and dry.   Neurological:      General: No focal deficit present.      Mental Status: He is alert and oriented to person, place, and time.   Psychiatric:         Mood and Affect: Mood normal.         Behavior: Behavior normal.          PAT AIRWAY:   Airway:     Mallampati::  III    TM distance::  >3 FB    Neck ROM::  Full   partials      Visit Vitals  BP (!) 148/91   Pulse 87   Temp 36.6 °C (97.8 °F) (Oral)       DASI Risk Score      Flowsheet Row Most Recent Value   DASI SCORE 50.7   METS Score (Will be calculated only when all the questions are answered) 9          Caprini DVT Assessment      Flowsheet Row Most Recent Value   DVT Score 10   Current Status Major surgery planned, lasting over 3 hours   History Prior major surgery   Age 40-59 years   BMI 31-40 (Obesity)          Modified Frailty Index      Flowsheet Row Most Recent Value   Modified Frailty Index Calculator .0909          CHADS2 Stroke Risk  Current as of 39 minutes ago        N/A 3 - 100%: High Risk   2 - 3%:  Medium Risk   0 - 2%: Low Risk     Last Change: N/A          This score determines the patient's risk of having a stroke if the patient has atrial fibrillation.        This score is not applicable to this patient. Components are not calculated.          Revised Cardiac Risk Index      Flowsheet Row Most Recent Value   Revised Cardiac Risk Calculator 0          Apfel Simplified Score      Flowsheet Row Most Recent Value   Apfel Simplified Score Calculator 2          Risk Analysis Index Results This Encounter    No data found in the last 1 encounters.       Stop Bang Score      Flowsheet Row Most Recent Value   Do you snore loudly? 1   Do you often feel tired or fatigued after your sleep? 0   Has anyone ever observed you stop breathing in your sleep? 1   Do you have or are you being treated for high blood pressure? 1   Recent BMI (Calculated) 34.1   Is BMI greater than 35 kg/m2? 0=No   Age older than 50 years old? 1=Yes   Is your neck circumference greater than 17 inches (Male) or 16 inches (Female)? 0   Gender - Male 1=Yes   STOP-BANG Total Score 5          Recent Results (from the past 168 hour(s))   Basic Metabolic Panel    Collection Time: 03/08/24  3:45 PM   Result Value Ref Range    Glucose 73 (L) 74 - 99 mg/dL    Sodium 140 136 - 145 mmol/L    Potassium 4.1 3.5 - 5.3 mmol/L    Chloride 102 98 - 107 mmol/L    Bicarbonate 26 21 - 32 mmol/L    Anion Gap 16 10 - 20 mmol/L    Urea Nitrogen 21 6 - 23 mg/dL    Creatinine 1.48 (H) 0.50 - 1.30 mg/dL    eGFR 56 (L) >60 mL/min/1.73m*2    Calcium 9.8 8.6 - 10.6 mg/dL   CBC    Collection Time: 03/08/24  3:45 PM   Result Value Ref Range    WBC 7.1 4.4 - 11.3 x10*3/uL    nRBC 0.0 0.0 - 0.0 /100 WBCs    RBC 5.94 (H) 4.50 - 5.90 x10*6/uL    Hemoglobin 14.5 13.5 - 17.5 g/dL    Hematocrit 46.7 41.0 - 52.0 %    MCV 79 (L) 80 - 100 fL    MCH 24.4 (L) 26.0 - 34.0 pg    MCHC 31.0 (L) 32.0 - 36.0 g/dL    RDW 17.1 (H) 11.5 - 14.5 %    Platelets 294 150 - 450 x10*3/uL   Type And Screen     Collection Time: 03/08/24  3:45 PM   Result Value Ref Range    ABO TYPE O     Rh TYPE POS     ANTIBODY SCREEN NEG    ECG 12 Lead    Collection Time: 03/08/24  4:36 PM   Result Value Ref Range    Ventricular Rate 80 BPM    Atrial Rate 80 BPM    AK Interval 154 ms    QRS Duration 90 ms    QT Interval 360 ms    QTC Calculation(Bazett) 415 ms    P Axis 26 degrees    R Axis -10 degrees    T Axis 34 degrees    QRS Count 13 beats    Q Onset 223 ms    P Onset 146 ms    P Offset 199 ms    T Offset 403 ms    QTC Fredericia 396 ms        Diagnostic Results    EKG 3/8/24  Normal sinus rhythm  Normal ECG  When compared with ECG of 16-SEP-2022 08:37,  No significant change was found    TRANSTHORACIC ECHOCARDIOGRAM REPORT   Study Date: 2/29/2024   CONCLUSIONS:   1. Left ventricular systolic function is normal with a 55-60% estimated ejection fraction.   2. There is moderate concentric left ventricular hypertrophy.   3. Normal RV size and function      RVSP could not be calculated as no significant TR Doppler envelope.   4. Normal mitral valve.   5. Aortic valve appears abnormal.   6. Probable bicuspid aortic valve which is densely calcified. The right coronary cusp leaflet is essentially immobile. There is mild aortic stenosis V-max 2.6 m/s mean gradient 18 mmHg valve area ~1.3 cm sq. 1+ eccentric AI.   7. Mild aortic valve regurgitation.   8. Minimally dilated aortic root 39 mm. Ascending aorta mildly dilated 42 mm.       Assessment and Plan:     Anesthesia:  The patient notes anesthesia complications in the past related to PONV.    Neuro:   The patient has no neurological diagnoses or significant findings on chart review, clinical presentation, and evaluation.  No grossly apparent perioperative risk. The patient is at increased risk for perioperative stroke secondary to hypertension , hyperlipidemia, general anesthesia, operative time >2.5 hours. Handouts for preoperative brain exercises given to patient.    HEENT/Airway  No  diagnoses, significant findings on chart review, clinical presentation, or evaluation. No documented or reported history of airway difficulty.     Cardiovascular  The patient is scheduled for non-cardiac surgery associated with elevated risk.  The patient has no major cardiac contraindications to non- cardiac surgery.  RCRI  The patient meets 0-1 RCRI criteria and therefore has a less than 1% risk of major adverse cardiac complications.  METS  The patient's functional capacity capacity is greater than 4 METS.  EKG  The patient has no EKG or echocardiographic changes concerning for myocardial ischemia.   Heart Failure  The patient has no known history of heart failure.  Additionally, the patient reports no symptoms of heart failure and demonstrates no signs of heart failure.  Hypertension Evaluation  The patient has a known history of hypertension that is controlled.  Patient's hypertension is most consistent with stage 2.  Heart Rhythm Evaluation  The patient has no history of arrhythmias.  Heart Valve Evaluation  The patient had recent echocardiogram 2/29/24. He has   Ascending thoracic aortic aneurysm, 4.3 cm as well as bicuspid valve. No intervention required at this time. Plan to refer to cardiology for surveillance per PCP.  CARDS EVAL  The patient has been refereed to cardiology, Dr. Caleb Mendes. Patient is awaiting appointment. Will try to expedite cards appointment prior to surgery.     The patient has a 30-day risk for MACE of 0 predictors, 3.9% risk for cardiac death, nonfatal myocardial infarction, and nonfatal cardiac arrest.  BIJAN score which indicates a 0.1% risk of intraoperative or 30-day postoperative.    Pulmonary   The patient has findings on chart review, clinical presentation and evaluation significant for WILFRID. The patient is at increased risk of perioperative pulmonary complications secondary to morbid obesity. Patient educated to bring CPAP/BIPAP day of surgery     Recommend prioritizing   nonopioid analgesic techniques (regional and local anesthesia, nonsteroidal medications, etc) before the administration of opioids and close monitoring for hypoventilation after surgery due to suspected WILFRID. If intravenous narcotics are needed beyond the immediate juliano-operative period, the patient may benefit from continuous pulse oximetry to monitor for hypoxic events till baseline Sp02 is normal on room air and  a respiratory therapy evaluation.    ARISCAT 26, Intermediate, 13.3% risk of in-hospital postoperative pulmonary complications  PRODIGY 16, high risk of respiratory depression episode. Patient given PI sheet for preoperative deep breathing exercises.    Hematology  No diagnoses or significant findings on chart review or clinical presentation and evaluation.  Antiplatelet management   The patient is currently receiving antiplatelet therapy for primary prevention of cardiovascular disease., The patient was instructed to continue in the perioperative period.  Anticoagulation management  The patient is not currently receiving anticoagulation therapy. Patient provided with DVT educational handout.      Caprini score 10, high risk of perioperative VTE.   Patient instructed to ambulate as soon as possible postoperatively to decrease thromboembolic risk. Initiate mechanical DVT prophylaxis as soon as possible and initiate chemical prophylaxis when deemed safe from a bleeding standpoint post surgery.     Gastrointestinal  The patient has diagnoses or significant findings on chart review or clinical presentation and evaluation significant for ulcerative colitis, follows with [facility] gastroenterology. Managed on Cimzia.  Eat 10- 0,  self-perceived oropharyngeal dysphagia scale (0-40)     Genitourinary  No diagnoses or significant findings on chart review or clinical presentation and evaluation.    Renal  The patient has no known history of chronic kidney disease. He has history of right renal cell carcinoma s/p  open right nephrectomy. Follows with Urology Dr. Cantu, last seen 3/5/24 for Left renal mass seen on US 2/29, per note no concern for malignancy, follows up prn.The patient has specific risk factors associated with increased risk of perioperative renal complications due to male gender, hypertension. Preventative measures include preoperative hydration.    Musculoskeletal  The patient has diagnoses or significant findings on chart review or clinical presentation and evaluation significant for OA,     Endocrine  Diabetes Evaluation  The patient has no history of diabetes mellitus  Thyroid Disease Evaluation  The patient has no history of thyroid disease.    ID  No diagnoses or significant findings on chart review or clinical presentation and evaluation.    Addendum 3/19/24: Patient seen by cardiology Dr. Gonzalez for preop eval, unable to give cardiac clearance. Patient will need stress test for further risk stratification. Surgeon's office aware.    -Preoperative medication instructions were provided and reviewed with the patient.  Any additional testing or evaluation was explained to the patient.  NPO Instructions were discussed, and the patient's questions were answered prior to conclusion of this encounter.

## 2024-03-10 LAB
ATRIAL RATE: 80 BPM
P AXIS: 26 DEGREES
P OFFSET: 199 MS
P ONSET: 146 MS
PR INTERVAL: 154 MS
Q ONSET: 223 MS
QRS COUNT: 13 BEATS
QRS DURATION: 90 MS
QT INTERVAL: 360 MS
QTC CALCULATION(BAZETT): 415 MS
QTC FREDERICIA: 396 MS
R AXIS: -10 DEGREES
T AXIS: 34 DEGREES
T OFFSET: 403 MS
VENTRICULAR RATE: 80 BPM

## 2024-03-11 DIAGNOSIS — E78.2 HYPERLIPEMIA, MIXED: ICD-10-CM

## 2024-03-11 RX ORDER — ATORVASTATIN CALCIUM 20 MG/1
20 TABLET, FILM COATED ORAL DAILY
Qty: 90 TABLET | Refills: 0 | Status: SHIPPED | OUTPATIENT
Start: 2024-03-11 | End: 2024-06-03 | Stop reason: SDUPTHER

## 2024-03-12 ENCOUNTER — HOSPITAL ENCOUNTER (OUTPATIENT)
Dept: RADIOLOGY | Facility: CLINIC | Age: 54
Discharge: HOME | End: 2024-03-12
Payer: COMMERCIAL

## 2024-03-12 ENCOUNTER — APPOINTMENT (OUTPATIENT)
Dept: RADIOLOGY | Facility: CLINIC | Age: 54
End: 2024-03-12
Payer: COMMERCIAL

## 2024-03-12 ENCOUNTER — CLINICAL SUPPORT (OUTPATIENT)
Dept: ALLERGY | Facility: CLINIC | Age: 54
End: 2024-03-12
Payer: COMMERCIAL

## 2024-03-12 DIAGNOSIS — J30.2 SEASONAL ALLERGIES: ICD-10-CM

## 2024-03-12 DIAGNOSIS — R91.1 NODULE OF RIGHT LUNG: ICD-10-CM

## 2024-03-12 DIAGNOSIS — N28.89 LEFT KIDNEY MASS: ICD-10-CM

## 2024-03-12 PROCEDURE — 78815 PET IMAGE W/CT SKULL-THIGH: CPT | Mod: PI

## 2024-03-12 PROCEDURE — 78815 PET IMAGE W/CT SKULL-THIGH: CPT | Mod: PET TUMOR INIT TX STRAT | Performed by: RADIOLOGY

## 2024-03-12 PROCEDURE — 3430000001 HC RX 343 DIAGNOSTIC RADIOPHARMACEUTICALS: Performed by: FAMILY MEDICINE

## 2024-03-12 PROCEDURE — 95117 IMMUNOTHERAPY INJECTIONS: CPT | Performed by: ALLERGY & IMMUNOLOGY

## 2024-03-12 PROCEDURE — A9552 F18 FDG: HCPCS | Performed by: FAMILY MEDICINE

## 2024-03-12 RX ORDER — FLUDEOXYGLUCOSE F 18 200 MCI/ML
11.4 INJECTION, SOLUTION INTRAVENOUS
Status: COMPLETED | OUTPATIENT
Start: 2024-03-12 | End: 2024-03-12

## 2024-03-12 RX ADMIN — FLUDEOXYGLUCOSE F 18 11.4 MILLICURIE: 200 INJECTION, SOLUTION INTRAVENOUS at 08:17

## 2024-03-13 ENCOUNTER — TELEPHONE (OUTPATIENT)
Dept: PRIMARY CARE | Facility: CLINIC | Age: 54
End: 2024-03-13
Payer: COMMERCIAL

## 2024-03-13 NOTE — TELEPHONE ENCOUNTER
Patient called asking if we could please send all his films to Dr. Carson his cardiologist at [facility].

## 2024-03-14 NOTE — PROGRESS NOTES
" Patient: Jamil Cisse    [id number]  : 1970 -- AGE 54 y.o.    Provider: Trish SCHAEFER [facility]     Location [facility]   Service Date: 24          [facility]  New Visit Note    HISTORY OF PRESENT ILLNESS     The patient's referring provider is: No ref. provider found    HISTORY OF PRESENT ILLNESS   Jamil Cisse \"Mau\" is a 54 y.o. male with a history of renal cell carcinoma in , Ulcerative colitis on Cimzia, GERD, Gout, Hypertension, Mixed hyperlipidemia, Migraines, CKD stage III, WILFRID and Encephalitis (unknown cause), who is a former smoker (26 pack years), who presents to a [facility] for an initial evaluation for shortness of breath.     I have independently interviewed and examined the patient in the office and reviewed available records.    Current History    Since last visit he reports he is still having shortness of that is intermittent and when walking on an incline, he has tried albuterol with no improvement. He denies cough, wheeze, shortness of breath at rest, chest pain.     24: On today's visit, the patient reports he noticed this past summer he was more short of breath, but states that it is not every day. States some days just walking to work makes him feel short of breath.  He works out regularly,has noticed after 2 minutes on elliptical he feels very short of breath.  He thought maybe it was his allergies he is taking 10 mg of cetirizine and of Allegra daily, he was started on montelukast a few months ago by allergist. He had COVID in the summer  and has noticed his allergies getting worse.  He denies cough, wheeze, shortness of breath at rest, chest pain. He has ulcerative colitis was on Humira and is now on Cimzia injections. He had encephalitis about 5 years ago.     Previous pulmonary history: He has no history of recurrent infections, or lung disease as a child.  He had no previous " lung hx, never on oxygen or inhaler therapy.     Inhalers/nebulized medications: none    Hospitalization History: He has not been hospitalized over the last year for breathing related problem.    Sleep history: WILFRID- CPAP    ALLERGIES AND MEDICATIONS     ALLERGIES  Allergies   Allergen Reactions    Adhesive Tape-Silicones Other and Unknown     skin pulled off    Codeine Unknown    Sulfa (Sulfonamide Antibiotics) Other    Latex Other     Added based on information entered during log entry, please review and add reactions, type, and severity as needed.  PT DENIES ALLERGY    Adhesive Other and Unknown     Pulls skin off    Amlodipine Besylate Rash     Flush    Lisinopril Other     ineffective    Sumatriptan Other     ineffective    Valsartan Other     ineffective       MEDICATIONS  Current Outpatient Medications   Medication Sig Dispense Refill    acetaminophen (Tylenol) 500 mg tablet Take 1 tablet (500 mg) by mouth every 6 hours if needed for mild pain (1 - 3).      albuterol (Ventolin HFA) 90 mcg/actuation inhaler Inhale 2 puffs every 6 hours if needed for wheezing. 18 g 3    aspirin 81 mg EC tablet Take 1 tablet (81 mg) by mouth once daily. 90 tablet 0    aspirin-acetaminophen-caffeine (Excedrin Extra Strength) 250-250-65 mg tablet Take 1 tablet by mouth every 6 hours if needed.      atorvastatin (Lipitor) 20 mg tablet Take 1 tablet (20 mg) by mouth once daily. 90 tablet 0    certolizumab pegol (Cimzia Powder for Reconst) injection Inject 1 mL (200 mg) under the skin every 14 (fourteen) days.      cetirizine (ZyrTEC) 10 mg tablet Take by mouth once daily.      colchicine 0.6 mg tablet Take 1 tablet (0.6 mg) by mouth once daily. 90 tablet 0    febuxostat (Uloric) 40 mg tablet Take 1 tablet (40 mg) by mouth once daily. 90 tablet 0    fexofenadine (Allegra) 180 mg tablet Take 1 tablet (180 mg) by mouth once daily.      fluticasone (Flonase) 50 mcg/actuation nasal spray Administer 2 sprays into each nostril once daily.  48 g 0    Maxalt-MLT 10 mg disintegrating tablet Take 1 tablet (10 mg) by mouth 1 time if needed for migraine. May repeat in 2 hours if unresolved. Do not exceed 30 mg in 24 hours. (Patient not taking: Reported on 1/4/2024) 30 tablet 0    montelukast (Singulair) 10 mg tablet Take 1 tablet (10 mg) by mouth once daily at bedtime.      omeprazole (PriLOSEC) 40 mg DR capsule Take 1 capsule (40 mg) by mouth once daily. 90 capsule 0    telmisartan-hydrochlorothiazid (MIcarDIS HCT) 80-25 mg tablet Take 1 tablet by mouth once daily. 90 tablet 0    Xiidra 5 % dropperette INSTILL 1 DROP IN BOTH EYES TWICE A DAY       No current facility-administered medications for this visit.         PAST HISTORY     PAST MEDICAL HISTORY  Allergic rhinitis  Ulcerative colitis - on Cimzia  GERD  Gout  Hypertension  Mixed hyperlipidemia  Migraines  CKD stage III  WILFRID  Encephalitis - 5 years  Renal cell carcinoma -2003      PAST SURGICAL HISTORY  Past Surgical History:   Procedure Laterality Date    COLONOSCOPY      CT CHEST WO IV CONTRAST  02/20/2024    Nonspecific 1.9 cm right upper lobe part solid nodule with mediastinal lymphadenopathy.Ascending thoracic aortic aneurysm, 4.3 cm.    HERNIA REPAIR      NEPHRECTOMY Right 2005    Right total nephrectomy    OTHER SURGICAL HISTORY      Left Shoulder surgery    OTHER SURGICAL HISTORY  07/09/2020    Ear surgery x3    OTHER SURGICAL HISTORY  07/09/2020    Finger surgical procedure    OTHER SURGICAL HISTORY  07/09/2020    Throat surgery    ROTATOR CUFF REPAIR Right 09/23/2022    Right rotator cuff repair with subacromial decompression    TOTAL HIP ARTHROPLASTY Bilateral 05/2017    Bilateral Hip replacement    US CAROTID DOPPLER LEFT  2018    NO HEMODYNAMICALLY SIGNIFICANT VASCULAR STENOSIS IN THE CAROTID AND VERTEBRAL ARTERIES IN THE NECK    VASECTOMY         IMMUNIZATION HISTORY  Immunization History   Administered Date(s) Administered    Flu vaccine (IIV4), preservative free *Check age/dose*  10/01/2018, 10/01/2019, 2020, 2022, 2023    Influenza, Unspecified 2010, 2022    Influenza, injectable, quadrivalent 09/15/2017    Influenza, seasonal, injectable 10/05/2021    Influenza, seasonal, injectable, preservative free 10/08/2016    PPD Test 2015    Pfizer Gray Cap SARS-CoV-2 2022    Pfizer Purple Cap SARS-CoV-2 2021, 2021    Zoster vaccine, recombinant, adult (SHINGRIX) 2023, 2023       SOCIAL HISTORY  Tobacco Smokin- 49 y/o- 1 pack/day - 26 pack years  Illicit drugs: none  Alcohol consumption: socially  Pets: no    OCCUPATIONAL/ENVIRONMENTAL HISTORY  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  Unknown exposure to asbestos, silica, beryllium or inhaled metals.    FAMILY HISTORY  No family history of pulmonary disease.  No family history of cancer.  Mother and maternal grandparents -rheumatoid arthritis    REVIEW OF SYSTEMS     REVIEW OF SYSTEMS  Review of Systems    Constitutional: No fever, no chills, no night sweats.    Eyes: No double vision, no floaters, no dry eyes.   ENT: See HPI.   Neck: No neck stiffness.  Cardiovascular: No sharp chest pain, no heart racing, no leg swelling.  Respiratory: as noted in HPI.   Gastrointestinal: No nausea, no vomiting, no diarrhea.   Musculoskeletal: No joint pain, no back pain.   Integumentary: No rashes or sores.  Neurological: No dizziness, no headaches. Sleeping well.  Psychiatric: No mood changes.   Endocrine: No hot flashes, no cold intolerance, weight is stable.  Hematologic: No easy bruising or bleeding.    PHYSICAL EXAM     VITAL SIGNS:   There were no vitals filed for this visit.        CURRENT WEIGHT: There is no height or weight on file to calculate BMI.    PREVIOUS WEIGHTS:  Wt Readings from Last 3 Encounters:   24 105 kg (231 lb 11.3 oz)   24 105 kg (231 lb)   24 101 kg (223 lb)       Physical Exam    Constitutional: General appearance: Alert and  oriented.  No acute distress. Well developed, well nourished.  Head and face: Symmetric  ENT: external inspection of ear and nose normal. No intranasal polyps. No oropharyngeal exudates.    Oropharynx: normal   Neck: supple, no lymphadenopathy  Pulmonary: Chest is normal. No increased work of breathing or signs of respiratory distress. Clear to auscultation bilaterally - no crackles, wheezing, or rhonchi.   Cardiovascular: Heart rate and rhythm normal. Normal S1, S2 - no murmurs, gallops, or pericardial rub.   Abdomen: Soft, non tender, +BS  Extremities: No edema. No clubbing or cyanosis of the fingernails.    Neurologic: Moves all four extremities   MSK: Normal movements of extremities. Gait normal   Psychiatric: Intact judgement and insight.    RESULTS/DATA     Pulmonary Function Test Results                 Chest Radiograph     No results found for this or any previous visit from the past 2000 days.      Chest CT Scan     Narrative & Impression   Interpreted By:  Declan Gusman and Bera Kaustav   STUDY:  NM PET CT SKULL BASE TO MID THIGH;  3/12/2024 9:54 am      INDICATION:  Signs/Symptoms:Lung nodule.      Remote history of right renal mass found in 2003, status post right  nephrectomy, with pathologic diagnosis as clear cell carcinoma. No  evidence of disease on follow-up.      COMPARISON:  CT chest without contrast on 02/20/2024      ACCESSION NUMBER(S):  [id number]      ORDERING CLINICIAN:  WALDO MUJICA      TECHNIQUE:  DIVISION OF NUCLEAR MEDICINE  POSITRON EMISSION TOMOGRAPHY (PET-CT)      The patient received an intravenous dose of 11.4 mCi of Fluorine-18  fluorodeoxyglucose (FDG).  Positron emission tomographic (PET) images  from mid thigh to skull base were then acquired after a one hour  delay. Also acquired was a contemporaneous low dose non-contrast CT  scan performed for attenuation correction of PET images and anatomic  localization.  The PET and CT images were digitally fused for  display.  All  images were acquired on a combined PET-CT scanner unit.  Some areas of FDG accumulation may be described in standardized  uptake value (SUV) units.      CODING:  Initial Treatment Strategy (PI)      CALIBRATION:  Dose Injection-to-Scan Interval (mins): 58 min  Mediastinal bloodpool SUV (normal 1.5-2.5): 1.8  Blood glucose: 1 6 mg/dL      FINDINGS:  HEAD AND NECK:  No evidence of focal hypermetabolic lesion in the visualized brain  parenchyma, noting that evaluation is limited because of the expected  physiologic diffuse FDG uptake in the brain. No focal hypermetabolic  soft tissue lesion is seen in the neck. No hypermetabolic cervical  lymphadenopathy is present.      CHEST:  Previously seen irregular opacity within the right upper lobe appears  less prominent as compared to prior, without increased metabolic  activity. No hypermetabolic soft tissue lesion is present in the  abdomen and pelvis. Again seen, multiple prominent to mildly enlarged  mediastinal and hilar lymph nodes, with increased metabolic activity.  For instance, there is a 1.4 cm in short axis subcarinal lymph node  with SUV max of 4.4, right lower paratracheal lymph node with SUV max  of 4.6 bilateral hilar lymph nodes with SUV max of 4.7 on the right  and 5.2 on the left.      ABDOMEN AND PELVIS:  No hypermetabolic soft tissue lesion within the abdomen and pelvis.  An exophytic 2.4 cm lesion within the interpolar region of the left  kidney demonstrates slightly higher than fluid attenuation, however  is photopenic. Physiologic radiotracer uptake is present in the liver  and spleen with excretion into the bowel loops and the genitourinary  tract.      MUSCULOSKELETAL:  No focal hypermetabolic lesion is seen in the axial or appendicular  to suggest osseous metastasis. There is an indeterminate linear focus  of increased metabolic activity with SUV max of 5.3 within the  posterior aspect of the proximal left arm, which is nonspecific.       IMPRESSION:  1. Previously seen irregular opacity within the right upper lobe  appears less prominent as compared to prior, without increased  metabolic activity. There are however multiple hypermetabolic  prominent to mildly enlarged mediastinal and hilar lymph nodes. This  is nonspecific. Findings likely suggest an infectious/inflammatory  process, however malignant involvement can not be definitively  excluded. Recommend short-term follow-up with dedicated CT of the  chest.  2. 2.4 cm exophytic lesion within the interpolar region of the left  kidney, with density slightly higher than fluid density, however with  no increased metabolic activity. Please note that FDG PET is not  sensitive for detecting RCC. Recommend 3 phase renal CT or renal MRI  for further evaluation, especially with patient's known history of  right-sided RCC.  3. Nonspecific increased metabolic activity within the proximal left  upper arm, could represent inflammation at a tendinous insertion  site. However, findings are nonspecific and recommend correlation  with symptoms and dedicated imaging if clinically indicated.          I personally reviewed the image(s) / study and agree with the  findings and interpretation as stated. This study was interpreted at  [facility].      Signed by: Declan Gusman 3/12/2024 10:33 AM  Dictation workstation:   [id number]         Echocardiogram     [facility] [address], [address], [place], [place] [address]             Tel [phone] Fax [phone]     TRANSTHORACIC ECHOCARDIOGRAM REPORT        Patient Name:      MARSHALL GUZMAN      Reading Physician:    [id number] Andre Bowman MD, [facility]  Study Date:        2/29/2024             Ordering Provider:    [id number] JOSUE RUBIO  MRN/PID:            [id number]              Fellow:  Accession#:        [id number]          Nurse:  Date of Birth/Age: 1970 / 54 years   Sonographer:          Linh Nguyen                                                                 RDCS, RT(R),                                                                 RDMS, RVT  Gender:            M                     Additional Staff:  Height:            175.26 cm             Admit Date:  Weight:            101.15 kg             Admission Status:  BSA / BMI:         2.16 m2 / 32.93 kg/m2 Department Location:  [facility]  Blood Pressure: 140 /88 mmHg     Study Type:    TRANSTHORACIC ECHO (TTE) COMPLETE  Diagnosis/ICD: Shortness of breath-R06.02     Patient History:  Pertinent History: HTN, Hyperlipidemia, Murmur, TIA and Past history of renal                     cell carcinoma.     Study Detail: The following Echo studies were performed: 2D, M-Mode, Doppler and                color flow.        PHYSICIAN INTERPRETATION:  Left Ventricle: Left ventricular systolic function is normal, with an estimated ejection fraction of 55-60%. There are no regional wall motion abnormalities. The left ventricular cavity size is normal. There is moderate concentric left ventricular hypertrophy. Spectral Doppler shows a normal pattern of left ventricular diastolic filling.  Left Atrium: The left atrium is normal in size.  Right Ventricle: The right ventricle is normal in size. There is normal right ventricular global systolic function. Normal RV size and function  RVSP could not be calculated as no significant TR Doppler envelope.  Right Atrium: The right atrium is normal in size.  Aortic Valve: The aortic valve appears abnormal. There is mild aortic valve regurgitation. The peak instantaneous gradient of the aortic valve is 26.2 mmHg. The mean gradient of the aortic valve is 18.0 mmHg. Probable bicuspid aortic valve which is  densely calcified. The right coronary cusp leaflet is essentially immobile. There is mild aortic stenosis V-max 2.6 m/s mean gradient 18 mmHg valve area ~1.3 cm sq. 1+ eccentric AI.  Mitral Valve: The mitral valve is normal in structure. There is no evidence of mitral valve regurgitation. Normal mitral valve.  Tricuspid Valve: The tricuspid valve is structurally normal. No evidence of tricuspid regurgitation.  Pulmonic Valve: The pulmonic valve is structurally normal. There is no indication of pulmonic valve regurgitation.  Pericardium: There is no pericardial effusion noted.  Aorta: The aortic root is abnormal. There is mild dilatation of the ascending aorta. There is mild dilatation of the aortic root. Minimally dilated aortic root 39 mm. Ascending aorta mildly dilated 42 mm.        CONCLUSIONS:   1. Left ventricular systolic function is normal with a 55-60% estimated ejection fraction.   2. There is moderate concentric left ventricular hypertrophy.   3. Normal RV size and function      RVSP could not be calculated as no significant TR Doppler envelope.   4. Normal mitral valve.   5. Aortic valve appears abnormal.   6. Probable bicuspid aortic valve which is densely calcified. The right coronary cusp leaflet is essentially immobile. There is mild aortic stenosis V-max 2.6 m/s mean gradient 18 mmHg valve area ~1.3 cm sq. 1+ eccentric AI.   7. Mild aortic valve regurgitation.   8. Minimally dilated aortic root 39 mm. Ascending aorta mildly dilated 42 mm.     QUANTITATIVE DATA SUMMARY:  2D MEASUREMENTS:                            Normal Ranges:  Ao Root d:     3.90 cm    (2.0-3.7cm)  LAs:           4.00 cm    (2.7-4.0cm)  RVIDd:         2.74 cm    (0.9-3.6cm)  IVSd:          1.55 cm    (0.6-1.1cm)  LVPWd:         1.47 cm    (0.6-1.1cm)  LVIDd:         5.00 cm    (3.9-5.9cm)  LVIDs:         3.50 cm  LV Mass Index: 150.6 g/m2  LV % FS        30.0 %     LA VOLUME:                              Normal Ranges:  LA Volume  Index: 13.0 ml/m2     AORTA MEASUREMENTS:                     Normal Ranges:  Asc Ao, d: 4.20 cm (2.1-3.4cm)     LV SYSTOLIC FUNCTION BY 2D PLANIMETRY (MOD):                      Normal Ranges:  EF-A4C View: 51.3 % (>=55%)  EF-A2C View: 58.3 %  EF-Biplane:  53.2 %     LV DIASTOLIC FUNCTION:                                Normal Ranges:  MV Peak E:        0.80 m/s    (0.7-1.2 m/s)  MV Peak A:        0.85 m/s    (0.42-0.7 m/s)  E/A Ratio:        0.94        (1.0-2.2)  MV lateral e'     0.10 m/s  MV medial e'      0.08 m/s  E/e' Ratio:       8.40        (<8.0)  PulmV Sys Jose Francisco:    54.10 cm/s  PulmV Ferrer Jose Francisco:   45.50 cm/s  PulmV S/D Jose Francisco:    1.20  PulmV A Revs Jose Francisco: 26.00 cm/s  PulmV A Revs Dur: 102.00 msec     MITRAL VALVE:                       Normal Ranges:  MV Vmax:    0.92 m/s (<=1.3m/s)  MV peak PG: 3.4 mmHg (<5mmHg)  MV mean P.0 mmHg (<48mmHg)  MV DT:      180 msec (150-240msec)     AORTIC VALVE:                                     Normal Ranges:  AoV Vmax:                2.56 m/s  (<=1.7m/s)  AoV Peak P.2 mmHg (<20mmHg)  AoV Mean P.0 mmHg (1.7-11.5mmHg)  LVOT Max Jose Francisco:            1.03 m/s  (<=1.1m/s)  AoV VTI:                 50.60 cm  (18-25cm)  LVOT VTI:                22.90 cm  LVOT Diameter:           2.00 cm   (1.8-2.4cm)  AoV Area, VTI:           1.42 cm2  (2.5-5.5cm2)  AoV Area,Vmax:           1.26 cm2  (2.5-4.5cm2)  AoV Dimensionless Index: 0.45     AORTIC INSUFFICIENCY:  AI Vmax:       4.06 m/s  AI Half-time:  304 msec  AI Decel Rate: 411.00 cm/s2     PULMONIC VALVE:                       Normal Ranges:  PV Max Jose Francisco: 0.8 m/s  (0.6-0.9m/s)  PV Max P.9 mmHg     Pulmonary Veins:  PulmV A Revs Dur: 102.00 msec  PulmV A Revs Jose Francisco: 26.00 cm/s  PulmV Ferrer Jose Francisco:   45.50 cm/s  PulmV S/D Jose Francisco:    1.20  PulmV Sys Jose Francisco:    54.10 cm/s        [id number] Andre Bowman MD, FACC  Electronically signed on 2024 at 6:12:21 PM               Labwork   Complete Blood Count  Lab Results  "  Component Value Date    WBC 7.1 03/08/2024    HGB 14.5 03/08/2024    HCT 46.7 03/08/2024    MCV 79 (L) 03/08/2024     03/08/2024       Peripheral Eosinophil Count/Percentage:   Eosinophils Absolute (x10E9/L)   Date Value   04/13/2023 0.17     Eosinophils % (%)   Date Value   04/13/2023 1.8       Serum Immunoglobulin E:    No results found for: \"IGE\"     ASSESSMENT/PLAN   Mr. Cisse is a 54 y.o. male, who is a former smoker (26 pack years), who presents to a [facility] for an initial evaluation for shortness of breath.     CT Chest: 1.9cm subsolid nodule  PET/CT: Previously seen irregular opacity within the right upper lobe  appears less prominent as compared to prior, without increased  metabolic activity. Findings likely suggest an infectious/inflammatory  Process. Recommend short follow up. 3 months  Echo: Aortic valve appears abnormal.   6. Probable bicuspid aortic valve which is densely calcified. The right coronary cusp leaflet is essentially immobile. There is mild aortic stenosis V-max 2.6 m/s mean gradient 18 mmHg valve area ~1.3 cm sq. 1+ eccentric AI.   7. Mild aortic valve regurgitation.   8. Minimally dilated aortic root 39 mm. Ascending aorta mildly dilated 42 mm.  PFT: normal   ILD labs: Negative    Problem List and Orders  Problem List Items Addressed This Visit    None    Assessment and Plan / Recommendations:  Problem List Items Addressed This Visit    None     Dyspnea; normal PFTs  - will get MCCT    Abnormal Echocardiogram ; calcification  - Follow up with PCP - on atorvastatin    Lung nodule; 1.9cm spiculated nodule  - Repeat CT Chest in 3 months   - will get T- spot TB test  - Start prednisone course 25mg daily decreasing by 5mg every week (start after getting Tspot TB test)    Hypermetabolic activity of left kidney on PET/CT  - referral to nephrology    Follow up in 3 months (after CT Chest) or sooner if needed. - Follow up with Dr. Destinee Cramer " or Dr. Amado    If you have any questions please call the office [phone]    Thank you for visiting the Pulmonary clinic today!   Trish Ruiz CNP  [phone]

## 2024-03-14 NOTE — TELEPHONE ENCOUNTER
Discussed PET scan result with patient this morning.  Advised that he will need to request the ECHO imaging be sent to Dr. Mendes (cardiac surgeon) at the [facility].  Will need to request radiology send it for him.    Also discussed need to see pulmonology for the hypermetabolic, prominent mediastinal and hilar lymph nodes.  Malignancy can not be excluded, but radiology stated that his could be inflammatory or infectious etiology.  Has follow up with  pulmonology 3/15/2024 (Trish Ruiz).

## 2024-03-15 ENCOUNTER — OFFICE VISIT (OUTPATIENT)
Dept: CARDIOLOGY | Facility: CLINIC | Age: 54
End: 2024-03-15
Payer: COMMERCIAL

## 2024-03-15 ENCOUNTER — TELEMEDICINE (OUTPATIENT)
Dept: PULMONOLOGY | Facility: CLINIC | Age: 54
End: 2024-03-15
Payer: COMMERCIAL

## 2024-03-15 VITALS
HEIGHT: 69 IN | TEMPERATURE: 98.6 F | HEART RATE: 97 BPM | DIASTOLIC BLOOD PRESSURE: 89 MMHG | SYSTOLIC BLOOD PRESSURE: 136 MMHG | BODY MASS INDEX: 34.3 KG/M2 | WEIGHT: 231.6 LBS

## 2024-03-15 DIAGNOSIS — E78.2 HYPERLIPEMIA, MIXED: ICD-10-CM

## 2024-03-15 DIAGNOSIS — Q23.1 BICUSPID AORTIC VALVE (HHS-HCC): ICD-10-CM

## 2024-03-15 DIAGNOSIS — I10 PRIMARY HYPERTENSION: ICD-10-CM

## 2024-03-15 DIAGNOSIS — Z87.891 FORMER SMOKER: ICD-10-CM

## 2024-03-15 DIAGNOSIS — R91.1 LUNG NODULE: Primary | ICD-10-CM

## 2024-03-15 DIAGNOSIS — N18.31 STAGE 3A CHRONIC KIDNEY DISEASE (MULTI): ICD-10-CM

## 2024-03-15 DIAGNOSIS — E66.1 CLASS 1 DRUG-INDUCED OBESITY WITH BODY MASS INDEX (BMI) OF 34.0 TO 34.9 IN ADULT, UNSPECIFIED WHETHER SERIOUS COMORBIDITY PRESENT: ICD-10-CM

## 2024-03-15 DIAGNOSIS — R06.02 SOB (SHORTNESS OF BREATH): ICD-10-CM

## 2024-03-15 DIAGNOSIS — N28.89 NODULE OF KIDNEY: ICD-10-CM

## 2024-03-15 DIAGNOSIS — M10.9 GOUT, UNSPECIFIED CAUSE, UNSPECIFIED CHRONICITY, UNSPECIFIED SITE: ICD-10-CM

## 2024-03-15 DIAGNOSIS — G47.33 OBSTRUCTIVE SLEEP APNEA ON CPAP: ICD-10-CM

## 2024-03-15 DIAGNOSIS — I77.810 DILATATION OF THORACIC AORTA (CMS-HCC): ICD-10-CM

## 2024-03-15 DIAGNOSIS — K51.919 ULCERATIVE COLITIS WITH COMPLICATION, UNSPECIFIED LOCATION (MULTI): ICD-10-CM

## 2024-03-15 DIAGNOSIS — I10 HTN (HYPERTENSION), BENIGN: ICD-10-CM

## 2024-03-15 PROCEDURE — 3079F DIAST BP 80-89 MM HG: CPT | Performed by: INTERNAL MEDICINE

## 2024-03-15 PROCEDURE — 99214 OFFICE O/P EST MOD 30 MIN: CPT

## 2024-03-15 PROCEDURE — 1036F TOBACCO NON-USER: CPT | Performed by: INTERNAL MEDICINE

## 2024-03-15 PROCEDURE — 1036F TOBACCO NON-USER: CPT

## 2024-03-15 PROCEDURE — 99204 OFFICE O/P NEW MOD 45 MIN: CPT | Performed by: INTERNAL MEDICINE

## 2024-03-15 PROCEDURE — 3008F BODY MASS INDEX DOCD: CPT

## 2024-03-15 PROCEDURE — 3008F BODY MASS INDEX DOCD: CPT | Performed by: INTERNAL MEDICINE

## 2024-03-15 PROCEDURE — 3075F SYST BP GE 130 - 139MM HG: CPT | Performed by: INTERNAL MEDICINE

## 2024-03-15 RX ORDER — PREDNISONE 5 MG/1
TABLET ORAL
Qty: 105 TABLET | Refills: 0 | Status: SHIPPED | OUTPATIENT
Start: 2024-03-15 | End: 2024-05-14 | Stop reason: WASHOUT

## 2024-03-15 RX ORDER — NEBULIZER AND COMPRESSOR
1 EACH MISCELLANEOUS AS NEEDED
Qty: 1 EACH | Refills: 0 | Status: SHIPPED | OUTPATIENT
Start: 2024-03-15 | End: 2024-04-05 | Stop reason: SDUPTHER

## 2024-03-15 ASSESSMENT — COLUMBIA-SUICIDE SEVERITY RATING SCALE - C-SSRS
6. HAVE YOU EVER DONE ANYTHING, STARTED TO DO ANYTHING, OR PREPARED TO DO ANYTHING TO END YOUR LIFE?: NO
2. HAVE YOU ACTUALLY HAD ANY THOUGHTS OF KILLING YOURSELF?: NO
1. IN THE PAST MONTH, HAVE YOU WISHED YOU WERE DEAD OR WISHED YOU COULD GO TO SLEEP AND NOT WAKE UP?: NO

## 2024-03-15 ASSESSMENT — PATIENT HEALTH QUESTIONNAIRE - PHQ9
SUM OF ALL RESPONSES TO PHQ9 QUESTIONS 1 AND 2: 0
2. FEELING DOWN, DEPRESSED OR HOPELESS: NOT AT ALL
1. LITTLE INTEREST OR PLEASURE IN DOING THINGS: NOT AT ALL
2. FEELING DOWN, DEPRESSED OR HOPELESS: NOT AT ALL
SUM OF ALL RESPONSES TO PHQ9 QUESTIONS 1 AND 2: 0
1. LITTLE INTEREST OR PLEASURE IN DOING THINGS: NOT AT ALL

## 2024-03-15 ASSESSMENT — ENCOUNTER SYMPTOMS
DEPRESSION: 0
OCCASIONAL FEELINGS OF UNSTEADINESS: 0
LOSS OF SENSATION IN FEET: 0
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
## 2024-03-18 ENCOUNTER — TELEPHONE (OUTPATIENT)
Dept: CARDIOLOGY | Facility: CLINIC | Age: 54
End: 2024-03-18
Payer: COMMERCIAL

## 2024-03-18 DIAGNOSIS — K21.9 GASTROESOPHAGEAL REFLUX DISEASE WITHOUT ESOPHAGITIS: ICD-10-CM

## 2024-03-18 RX ORDER — OMEPRAZOLE 40 MG/1
40 CAPSULE, DELAYED RELEASE ORAL DAILY
Qty: 90 CAPSULE | Refills: 0 | Status: SHIPPED | OUTPATIENT
Start: 2024-03-18

## 2024-03-19 ENCOUNTER — CLINICAL SUPPORT (OUTPATIENT)
Dept: ALLERGY | Facility: CLINIC | Age: 54
End: 2024-03-19
Payer: COMMERCIAL

## 2024-03-19 ENCOUNTER — TELEPHONE (OUTPATIENT)
Dept: OTOLARYNGOLOGY | Facility: CLINIC | Age: 54
End: 2024-03-19

## 2024-03-19 DIAGNOSIS — J30.2 SEASONAL ALLERGIES: ICD-10-CM

## 2024-03-19 DIAGNOSIS — J30.1 SEASONAL ALLERGIC RHINITIS DUE TO POLLEN: Primary | ICD-10-CM

## 2024-03-19 PROCEDURE — 95117 IMMUNOTHERAPY INJECTIONS: CPT | Performed by: ALLERGY & IMMUNOLOGY

## 2024-03-19 RX ORDER — MONTELUKAST SODIUM 10 MG/1
10 TABLET ORAL NIGHTLY
Qty: 90 TABLET | Refills: 1 | Status: SHIPPED | OUTPATIENT
Start: 2024-03-19

## 2024-03-20 ENCOUNTER — APPOINTMENT (OUTPATIENT)
Dept: PULMONOLOGY | Facility: CLINIC | Age: 54
End: 2024-03-20
Payer: COMMERCIAL

## 2024-03-21 ENCOUNTER — APPOINTMENT (OUTPATIENT)
Dept: PULMONOLOGY | Facility: CLINIC | Age: 54
End: 2024-03-21
Payer: COMMERCIAL

## 2024-03-21 ENCOUNTER — LAB (OUTPATIENT)
Dept: LAB | Facility: LAB | Age: 54
End: 2024-03-21
Payer: COMMERCIAL

## 2024-03-21 DIAGNOSIS — R91.1 LUNG NODULE: ICD-10-CM

## 2024-03-21 PROCEDURE — 86481 TB AG RESPONSE T-CELL SUSP: CPT

## 2024-03-21 PROCEDURE — 36415 COLL VENOUS BLD VENIPUNCTURE: CPT

## 2024-03-26 ENCOUNTER — APPOINTMENT (OUTPATIENT)
Dept: ALLERGY | Facility: CLINIC | Age: 54
End: 2024-03-26
Payer: COMMERCIAL

## 2024-03-26 ENCOUNTER — CLINICAL SUPPORT (OUTPATIENT)
Dept: ALLERGY | Facility: CLINIC | Age: 54
End: 2024-03-26
Payer: COMMERCIAL

## 2024-03-26 DIAGNOSIS — J30.2 SEASONAL ALLERGIES: ICD-10-CM

## 2024-03-26 PROCEDURE — 95117 IMMUNOTHERAPY INJECTIONS: CPT | Performed by: ALLERGY & IMMUNOLOGY

## 2024-03-28 ENCOUNTER — CLINICAL SUPPORT (OUTPATIENT)
Dept: CARDIOLOGY | Facility: CLINIC | Age: 54
End: 2024-03-28
Payer: COMMERCIAL

## 2024-03-28 ENCOUNTER — HOSPITAL ENCOUNTER (OUTPATIENT)
Dept: RADIOLOGY | Facility: CLINIC | Age: 54
Discharge: HOME | End: 2024-03-28
Payer: COMMERCIAL

## 2024-03-28 DIAGNOSIS — I10 PRIMARY HYPERTENSION: ICD-10-CM

## 2024-03-28 DIAGNOSIS — I77.810 DILATATION OF THORACIC AORTA (CMS-HCC): ICD-10-CM

## 2024-03-28 DIAGNOSIS — I10 HTN (HYPERTENSION), BENIGN: ICD-10-CM

## 2024-03-28 DIAGNOSIS — Q23.1 BICUSPID AORTIC VALVE (HHS-HCC): ICD-10-CM

## 2024-03-28 PROCEDURE — 3430000001 HC RX 343 DIAGNOSTIC RADIOPHARMACEUTICALS: Performed by: INTERNAL MEDICINE

## 2024-03-28 PROCEDURE — A9502 TC99M TETROFOSMIN: HCPCS | Performed by: INTERNAL MEDICINE

## 2024-03-28 PROCEDURE — 78452 HT MUSCLE IMAGE SPECT MULT: CPT

## 2024-03-28 PROCEDURE — 93017 CV STRESS TEST TRACING ONLY: CPT

## 2024-03-28 RX ADMIN — TETROFOSMIN 35 MILLICURIE: 0.23 INJECTION, POWDER, LYOPHILIZED, FOR SOLUTION INTRAVENOUS at 09:49

## 2024-03-28 RX ADMIN — TETROFOSMIN 10.8 MILLICURIE: 0.23 INJECTION, POWDER, LYOPHILIZED, FOR SOLUTION INTRAVENOUS at 08:27

## 2024-03-29 ENCOUNTER — TELEPHONE (OUTPATIENT)
Dept: CARDIOLOGY | Facility: CLINIC | Age: 54
End: 2024-03-29
Payer: COMMERCIAL

## 2024-03-29 NOTE — TELEPHONE ENCOUNTER
Left a message for patient on voicemail asking to return call to office in regards to Dr. Gonzalez message.

## 2024-03-29 NOTE — TELEPHONE ENCOUNTER
----- Message from Kemi Gonzalez MD sent at 3/29/2024  1:59 PM EDT -----  Based on the stress perfusion results, his cardiac risk is acceptable for upcoming surgery and may proceed as planned.  Please let patient know so he can let his surgeon know.  Thank you

## 2024-04-01 ENCOUNTER — OFFICE VISIT (OUTPATIENT)
Dept: PRIMARY CARE | Facility: CLINIC | Age: 54
End: 2024-04-01
Payer: COMMERCIAL

## 2024-04-01 VITALS
HEART RATE: 93 BPM | HEIGHT: 69 IN | OXYGEN SATURATION: 96 % | TEMPERATURE: 98.4 F | SYSTOLIC BLOOD PRESSURE: 148 MMHG | DIASTOLIC BLOOD PRESSURE: 84 MMHG | BODY MASS INDEX: 33.46 KG/M2 | WEIGHT: 225.9 LBS

## 2024-04-01 DIAGNOSIS — R06.02 SOB (SHORTNESS OF BREATH): ICD-10-CM

## 2024-04-01 DIAGNOSIS — M1A.9XX0 CHRONIC GOUT WITHOUT TOPHUS, UNSPECIFIED CAUSE, UNSPECIFIED SITE: ICD-10-CM

## 2024-04-01 DIAGNOSIS — R91.1 NODULE OF RIGHT LUNG: ICD-10-CM

## 2024-04-01 DIAGNOSIS — I71.21 ANEURYSM OF ASCENDING AORTA WITHOUT RUPTURE (CMS-HCC): ICD-10-CM

## 2024-04-01 DIAGNOSIS — I10 PRIMARY HYPERTENSION: Primary | ICD-10-CM

## 2024-04-01 DIAGNOSIS — N18.31 STAGE 3A CHRONIC KIDNEY DISEASE (MULTI): ICD-10-CM

## 2024-04-01 DIAGNOSIS — M1A.00X0 IDIOPATHIC CHRONIC GOUT WITHOUT TOPHUS, UNSPECIFIED SITE: ICD-10-CM

## 2024-04-01 DIAGNOSIS — K21.9 GASTROESOPHAGEAL REFLUX DISEASE WITHOUT ESOPHAGITIS: ICD-10-CM

## 2024-04-01 DIAGNOSIS — G43.009 MIGRAINE WITHOUT AURA AND WITHOUT STATUS MIGRAINOSUS, NOT INTRACTABLE: ICD-10-CM

## 2024-04-01 DIAGNOSIS — G47.33 OBSTRUCTIVE SLEEP APNEA: ICD-10-CM

## 2024-04-01 DIAGNOSIS — K50.919 CROHN'S DISEASE WITH COMPLICATION, UNSPECIFIED GASTROINTESTINAL TRACT LOCATION (MULTI): ICD-10-CM

## 2024-04-01 DIAGNOSIS — E78.2 HYPERLIPEMIA, MIXED: ICD-10-CM

## 2024-04-01 DIAGNOSIS — E66.9 CLASS 1 OBESITY WITH SERIOUS COMORBIDITY AND BODY MASS INDEX (BMI) OF 34.0 TO 34.9 IN ADULT, UNSPECIFIED OBESITY TYPE: ICD-10-CM

## 2024-04-01 PROCEDURE — 3079F DIAST BP 80-89 MM HG: CPT | Performed by: FAMILY MEDICINE

## 2024-04-01 PROCEDURE — 3008F BODY MASS INDEX DOCD: CPT | Performed by: FAMILY MEDICINE

## 2024-04-01 PROCEDURE — 99214 OFFICE O/P EST MOD 30 MIN: CPT | Performed by: FAMILY MEDICINE

## 2024-04-01 PROCEDURE — 3077F SYST BP >= 140 MM HG: CPT | Performed by: FAMILY MEDICINE

## 2024-04-01 RX ORDER — NEBIVOLOL 5 MG/1
5 TABLET ORAL DAILY
Qty: 90 TABLET | Refills: 0 | Status: SHIPPED | OUTPATIENT
Start: 2024-04-01 | End: 2024-04-30 | Stop reason: WASHOUT

## 2024-04-01 RX ORDER — FEBUXOSTAT 40 MG/1
40 TABLET, FILM COATED ORAL DAILY
Qty: 90 TABLET | Refills: 0 | Status: SHIPPED | OUTPATIENT
Start: 2024-04-01

## 2024-04-01 RX ORDER — COLCHICINE 0.6 MG/1
0.6 TABLET ORAL DAILY
Qty: 90 TABLET | Refills: 0 | Status: SHIPPED | OUTPATIENT
Start: 2024-04-01

## 2024-04-01 NOTE — PATIENT INSTRUCTIONS
Cardiologists that we discussed today:  Dr. Los Deal    Nephrologist:  Dr. Tien Kemp    Schedule appts with the specialists as we discussed.    Follow up in 3 months.    We are expecting to start seeing patients at our new location on 5/1/2024.  Our new address will be:  [address] [place], [place] [id number]     It was a pleasure to see you today. Thank you for choosing us for your health care needs.    If you have lab or other testing completed and have not been informed of results within one week, please call the office for your results.    If you haven't done so, consider signing up for [facility], the [facility] personal health record. Ask the staff how you can get started.

## 2024-04-01 NOTE — PROGRESS NOTES
"Subjective   Patient ID: Mau Cisse is a 54 y.o. male who presents for Follow-up.    HPI     Patient wants to discuss seeing a cardiologist as well as a sleep doctor.  He is going to be referred to a pulmonologist by his pulmonary Nurse practitioner for further evaluation of his SOB with exertion.    While undergoing evaluation of his SOB, he underwent a CT scan of the chest he was found to have an aneurysm of the ascending aorta, suspected bicuspid aortic valve, a right lung nodule, and a left renal mass.  Left renal \"mass\" was found to be a non-worrisome cyst.  PET scan did NOT show the lung nodule to be worrisome for a malignancy.  He is establishing with a cardiac surgeon at the [facility] for the aneurysm and bicuspid aortic valve.    His SOB with exertion is essentially unchanged.          Patient has hypertension.  He does not monitor BP at home.   Denies CP, SOB, dizziness, and LE edema.   Patient is compliant with his antihypertensive therapy and denies any noted side effects.     He has hyperlipidemia.  Patient has been compliant with his statin therapy and denies any noted side effects.  Pt does try to reduce the amount of cholesterol and fatty foods he consumes.     Pt has GERD.  GERD is stable with the current dose of omeprazole.   He denies any breakthrough reflux symptoms.     Patient has allergic rhinitis  His allergies are well controlled with a combination of Zyrtec and fluticasone nasal spray.     Pt has a hx of gout.   He takes Colchicine prn.   He denies any flare-ups since his last visit here.     He has a history of osteoarthritis of both hips.  He underwent a right hip replacement in the past.   Underwent a left hip replacement 6/8/2020.     Pt has Crohn's disease.  He sees GI ( Dr. Richard) on a regular basis.   He receives Cimzia injections TWICE a month of Cimzia.  He currently denies any abdominal pain, nausea, vomiting, diarrhea, melena, or hematochezia.     Patient has migraine " "headaches.   He takes Maxalt as needed for his migraines.  Patient has been unable to take the generic version of Maxalt (rizatriptan) as it gives him dizziness, upset stomach, nausea, and lethargy.   AS A RESULT OF HIS INTOLERANCE TO GENERIC MAXALT, WE WERE ABLE TO GET THE BRAND NAME MEDICATION APPROVED FOR HIM. He tolerates the brand-name medication without noted side effects.     Review of Systems  Constitutional: Patient denies any fever, chills, loss of appetite, or unexplained weight loss.y    Cardiovascular: Patient denies any chest pain, tachycardia, palpitations, orthopnea, or paroxysmal nocturnal dyspnea.  He has some SOB with exertion.    Respiratory: Patient denies any cough or wheezing.  He has some exertional SOB.    Gastrointestinal: Patient denies any nausea, vomiting, diarrhea, constipation, melena, hematochezia, or reflux symptoms.  Skin: Denies any rashes or skin lesions.   Neurology: Patient denies any new motor or sensory losses.  Denies any numbness, tingling, weakness, and incoordination of the extremities.  Patient also denies any tremor, seizures, or gait instability.  Endocrinology: Denies any polyuria, polydipsia, polyphagia, or heat/cold intolerance.      Objective   /84   Pulse 93   Temp 36.9 °C (98.4 °F)   Ht 1.753 m (5' 9\")   Wt 102 kg (225 lb 14.4 oz)   SpO2 96%   BMI 33.36 kg/m²     Physical Exam  General Appearance: Alert and cooperative, in no acute distress, well-developed/well-nourished.  Neck: Supple and without adenopathy or rigidity.  There is no JVD at 90° and no carotid bruits are noted.  There is no thyromegaly, thyroid tenderness, or palpable thyroid nodules.    Heart: Regular rate and rhythm with 2/6 murmur at LSB and no noted ectopy.    Respiratory: Lungs are clear to auscultation bilaterally with good air exchange.  Good respiratory effort and no accessory muscle use.  Skin: Good turgor, moist, warm and without rashes or lesions.  Neurological exam: Alert " and oriented ×3, no tremor, normal gait.  Extremities: No clubbing, cyanosis, or edema        Assessment/Plan     HTN: Blood pressure NOT at goal over the last several readings.  Discussed need for better BP control especially given the presence of the ascending aortic aneurysm.  Was not tolerant of amlodipine in past.  4/1/2025:  Will give him a trial of Nebivolol 5 mg daily.     HLD: Stable based on recent labs.  Risk factor reduction for CAD was discussed at length.  He has tolerated the statin therapy without side effects.  3/28/2024: Stress test normal.     GERD: Stable based on symptoms.  He will continue the current dosing of omeprazole and appropriate dietary changes.     Migraine headaches: Stable on the current treatment plan.  Patient will continue to use the Maxalt as needed. The brand-name Maxalt has continued to work well.  HE CAN NOT TOLERATE THE GENERIC FORM AS HE EXPERIENCES SIDE EFFECTS OF DIZZINESS, UPSET STOMACH, NAUSEA, AND LETHARGY.      Crohn's Disease: Stable based on symptoms.  We will continue the current medication and continue to follow with his GI specialist.     Gout: Stable based on symptoms as has not had a recent flare.  We will continue to monitor.     Stage 3a CKD: Stable based on labs.  He was encouraged to avoid NSAIDs.   Pt will be establishing with a nephrologist, Dr. Kemp on 5/14/2024.     Obstructive sleep apnea:  He was started on CPAP but could not tolerate the pressure at 16 cm H2O.   Was changed to Auto pap and referred to sleep medicine for evaluation and suggestions on best course of action.  He will discuss with his pulmonologist as his nurse practitioner is referring him to a pulmonologist.     Obesity: Dietary changes, exercise, and maintenance of a healthy weight were discussed at length.     SOB with exertion:  Symptoms have persisted.  Has seen nurse practitioner in pulmonology and cardiology at this point.  No underlying cause noted yet.  Patient being  referred to a pulmonologist for additional evaluation.    Aneurysm of the ascending aorta / Bicuspid aortic valve:   Pt has seen cardiology and has follow up scheduled.  He is also establishing with a cardiac surgeon at the [facility] for an opinion as well.    Right lung nodule:  PET scan was not worrisome for a malignancy.  Will need to monitor.        Requested Prescriptions     Signed Prescriptions Disp Refills    colchicine 0.6 mg tablet 90 tablet 0     Sig: Take 1 tablet (0.6 mg) by mouth once daily.    febuxostat (Uloric) 40 mg tablet 90 tablet 0     Sig: Take 1 tablet (40 mg) by mouth once daily.    nebivolol (Bystolic) 5 mg tablet 90 tablet 0     Sig: Take 1 tablet (5 mg) by mouth once daily.     Patient not taking: Reported on 4/24/2024

## 2024-04-02 ENCOUNTER — APPOINTMENT (OUTPATIENT)
Dept: ALLERGY | Facility: CLINIC | Age: 54
End: 2024-04-02
Payer: COMMERCIAL

## 2024-04-02 ENCOUNTER — APPOINTMENT (OUTPATIENT)
Dept: NEPHROLOGY | Facility: CLINIC | Age: 54
End: 2024-04-02
Payer: COMMERCIAL

## 2024-04-05 ENCOUNTER — OFFICE VISIT (OUTPATIENT)
Dept: CARDIOLOGY | Facility: CLINIC | Age: 54
End: 2024-04-05
Payer: COMMERCIAL

## 2024-04-05 VITALS
DIASTOLIC BLOOD PRESSURE: 89 MMHG | HEART RATE: 92 BPM | WEIGHT: 230 LBS | SYSTOLIC BLOOD PRESSURE: 158 MMHG | TEMPERATURE: 99.1 F | BODY MASS INDEX: 34.07 KG/M2 | HEIGHT: 69 IN

## 2024-04-05 DIAGNOSIS — N18.9 CHRONIC KIDNEY DISEASE, UNSPECIFIED CKD STAGE: ICD-10-CM

## 2024-04-05 DIAGNOSIS — I10 PRIMARY HYPERTENSION: ICD-10-CM

## 2024-04-05 DIAGNOSIS — Z01.810 ENCOUNTER FOR PRE-OPERATIVE CARDIOVASCULAR CLEARANCE: ICD-10-CM

## 2024-04-05 DIAGNOSIS — I77.810 DILATATION OF THORACIC AORTA (CMS-HCC): ICD-10-CM

## 2024-04-05 DIAGNOSIS — Z71.2 ENCOUNTER TO DISCUSS TEST RESULTS: ICD-10-CM

## 2024-04-05 PROBLEM — I77.819 DILATATION OF AORTA (CMS-HCC): Status: ACTIVE | Noted: 2024-04-05

## 2024-04-05 PROBLEM — I77.819 DILATATION OF AORTA (CMS-HCC): Status: RESOLVED | Noted: 2024-04-05 | Resolved: 2024-04-05

## 2024-04-05 PROCEDURE — 3079F DIAST BP 80-89 MM HG: CPT | Performed by: INTERNAL MEDICINE

## 2024-04-05 PROCEDURE — 3008F BODY MASS INDEX DOCD: CPT | Performed by: INTERNAL MEDICINE

## 2024-04-05 PROCEDURE — 3077F SYST BP >= 140 MM HG: CPT | Performed by: INTERNAL MEDICINE

## 2024-04-05 PROCEDURE — 1036F TOBACCO NON-USER: CPT | Performed by: INTERNAL MEDICINE

## 2024-04-05 PROCEDURE — 99214 OFFICE O/P EST MOD 30 MIN: CPT | Performed by: INTERNAL MEDICINE

## 2024-04-05 RX ORDER — NEBULIZER AND COMPRESSOR
1 EACH MISCELLANEOUS AS NEEDED
Qty: 1 EACH | Refills: 0 | Status: SHIPPED | OUTPATIENT
Start: 2024-04-05 | End: 2024-05-14 | Stop reason: WASHOUT
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
## 2024-04-09 ENCOUNTER — CLINICAL SUPPORT (OUTPATIENT)
Dept: ALLERGY | Facility: CLINIC | Age: 54
End: 2024-04-09
Payer: COMMERCIAL

## 2024-04-09 DIAGNOSIS — H71.22 CHOLESTEATOMA OF LEFT MASTOID: ICD-10-CM

## 2024-04-09 DIAGNOSIS — Z01.818 PRE-OP TESTING: ICD-10-CM

## 2024-04-09 DIAGNOSIS — J30.2 SEASONAL ALLERGIES: ICD-10-CM

## 2024-04-09 PROCEDURE — 95117 IMMUNOTHERAPY INJECTIONS: CPT | Performed by: ALLERGY & IMMUNOLOGY

## 2024-04-16 ENCOUNTER — CLINICAL SUPPORT (OUTPATIENT)
Dept: ALLERGY | Facility: CLINIC | Age: 54
End: 2024-04-16
Payer: COMMERCIAL

## 2024-04-16 ENCOUNTER — APPOINTMENT (OUTPATIENT)
Dept: UROLOGY | Facility: CLINIC | Age: 54
End: 2024-04-16
Payer: COMMERCIAL

## 2024-04-16 DIAGNOSIS — J30.2 SEASONAL ALLERGIES: ICD-10-CM

## 2024-04-16 PROCEDURE — 95117 IMMUNOTHERAPY INJECTIONS: CPT | Performed by: ALLERGY & IMMUNOLOGY

## 2024-04-18 ENCOUNTER — APPOINTMENT (OUTPATIENT)
Dept: OTOLARYNGOLOGY | Facility: CLINIC | Age: 54
End: 2024-04-18
Payer: COMMERCIAL

## 2024-04-23 ENCOUNTER — CLINICAL SUPPORT (OUTPATIENT)
Dept: ALLERGY | Facility: CLINIC | Age: 54
End: 2024-04-23
Payer: COMMERCIAL

## 2024-04-23 DIAGNOSIS — J30.2 SEASONAL ALLERGIES: ICD-10-CM

## 2024-04-23 PROCEDURE — 95117 IMMUNOTHERAPY INJECTIONS: CPT | Performed by: ALLERGY & IMMUNOLOGY

## 2024-04-24 ENCOUNTER — OFFICE VISIT (OUTPATIENT)
Dept: PRIMARY CARE | Facility: CLINIC | Age: 54
End: 2024-04-24
Payer: COMMERCIAL

## 2024-04-24 VITALS
WEIGHT: 234 LBS | OXYGEN SATURATION: 93 % | BODY MASS INDEX: 34.56 KG/M2 | SYSTOLIC BLOOD PRESSURE: 149 MMHG | HEART RATE: 103 BPM | TEMPERATURE: 98.2 F | DIASTOLIC BLOOD PRESSURE: 91 MMHG

## 2024-04-24 DIAGNOSIS — H66.92 LEFT OTITIS MEDIA, UNSPECIFIED OTITIS MEDIA TYPE: Primary | ICD-10-CM

## 2024-04-24 PROCEDURE — 3008F BODY MASS INDEX DOCD: CPT | Performed by: PHYSICIAN ASSISTANT

## 2024-04-24 PROCEDURE — 3077F SYST BP >= 140 MM HG: CPT | Performed by: PHYSICIAN ASSISTANT

## 2024-04-24 PROCEDURE — 3080F DIAST BP >= 90 MM HG: CPT | Performed by: PHYSICIAN ASSISTANT

## 2024-04-24 PROCEDURE — 99213 OFFICE O/P EST LOW 20 MIN: CPT | Performed by: PHYSICIAN ASSISTANT

## 2024-04-24 RX ORDER — CIPROFLOXACIN AND DEXAMETHASONE 3; 1 MG/ML; MG/ML
4 SUSPENSION/ DROPS AURICULAR (OTIC) 2 TIMES DAILY
Qty: 7.5 ML | Refills: 1 | Status: SHIPPED | OUTPATIENT
Start: 2024-04-24 | End: 2024-05-01

## 2024-04-24 RX ORDER — CEFUROXIME AXETIL 250 MG/1
250 TABLET ORAL 2 TIMES DAILY
Qty: 20 TABLET | Refills: 0 | Status: SHIPPED | OUTPATIENT
Start: 2024-04-24 | End: 2024-05-04

## 2024-04-24 RX ORDER — CLINDAMYCIN HYDROCHLORIDE 300 MG/1
CAPSULE ORAL
COMMUNITY
Start: 2024-04-15

## 2024-04-24 NOTE — PROGRESS NOTES
Subjective   Patient ID: Mau Cisse is a 54 y.o. male who presents for Earache.    HPI   X 4-5 d, more discharge than NL, darker/thicker, less hearing acuity  Pt is c/o L ear pain, fullness, pain,     Surgery ossiculoplasty is in June for cholesteatoma of the R ear. He has almost no hearing in that ear but it is worse recently.  He just basically needs to be without infection leading up to surgery    Review of Systems    Objective   BP (!) 149/91 (BP Location: Right arm, Patient Position: Sitting)   Pulse 103   Temp 36.8 °C (98.2 °F) (Oral)   Wt 106 kg (234 lb)   SpO2 93%   BMI 34.56 kg/m²     Physical Exam  Gen. appearance: Alert and cooperative, in no acute distress, well-nourished, well-developed male.  EENT: Mucous membranes are moist, external auditory canal R side with discharge lining the walls of the EAC and a completely opaque, white flaky TM; L TM and EAC WNL.  Pharynx is without erythema or exudate.  There is no noted rhinorrhea.  Neck: Supple and without adenopathy or rigidity.  There is no JVD at 90° and no carotid bruits are noted.  There is no thyromegaly, thyroid tenderness, or palpable thyroid nodules.  Heart: Regular rate and rhythm without murmur or ectopy.  Lungs: Lungs are clear to auscultation bilaterally with good air exchange.      Assessment/Plan   Diagnoses and all orders for this visit:  Left otitis media, unspecified otitis media type  -     cefuroxime (Ceftin) 250 mg tablet; Take 1 tablet (250 mg) by mouth 2 times a day for 10 days.  -     ciprofloxacin-dexamethasone (CiproDEX) otic suspension; Administer 4 drops into the left ear 2 times a day for 7 days  Patient is to start Ceftin and ciprofloxacin drops as directed.  He is to call if there is not any improvement within 7 to 10 days.      Patient understands that should they have testing outside   facilities that we may not receive the results and was told to call us if they have not heard from our office within a week after  testing.    [facility] uses voice recognition technology for dictations. Sometimes the software misinterprets words. Please take this into account when reading this.

## 2024-04-25 ENCOUNTER — HOSPITAL ENCOUNTER (OUTPATIENT)
Dept: RESPIRATORY THERAPY | Facility: HOSPITAL | Age: 54
Discharge: HOME | End: 2024-04-25
Payer: COMMERCIAL

## 2024-04-25 DIAGNOSIS — R06.02 SOB (SHORTNESS OF BREATH): ICD-10-CM

## 2024-04-25 PROCEDURE — 95012 NITRIC OXIDE EXP GAS DETER: CPT

## 2024-05-07 ENCOUNTER — CLINICAL SUPPORT (OUTPATIENT)
Dept: ALLERGY | Facility: CLINIC | Age: 54
End: 2024-05-07
Payer: COMMERCIAL

## 2024-05-07 DIAGNOSIS — J30.9 ALLERGIC RHINITIS, UNSPECIFIED SEASONALITY, UNSPECIFIED TRIGGER: ICD-10-CM

## 2024-05-07 DIAGNOSIS — J30.2 SEASONAL ALLERGIES: ICD-10-CM

## 2024-05-07 PROCEDURE — 95117 IMMUNOTHERAPY INJECTIONS: CPT | Performed by: ALLERGY & IMMUNOLOGY

## 2024-05-08 RX ORDER — FLUTICASONE PROPIONATE 50 MCG
2 SPRAY, SUSPENSION (ML) NASAL DAILY
Qty: 48 G | Refills: 0 | Status: SHIPPED | OUTPATIENT
Start: 2024-05-08

## 2024-05-14 ENCOUNTER — OFFICE VISIT (OUTPATIENT)
Dept: NEPHROLOGY | Facility: CLINIC | Age: 54
End: 2024-05-14
Payer: COMMERCIAL

## 2024-05-14 VITALS
BODY MASS INDEX: 34.36 KG/M2 | WEIGHT: 232 LBS | HEART RATE: 87 BPM | HEIGHT: 69 IN | SYSTOLIC BLOOD PRESSURE: 130 MMHG | DIASTOLIC BLOOD PRESSURE: 82 MMHG

## 2024-05-14 DIAGNOSIS — N18.31 STAGE 3A CHRONIC KIDNEY DISEASE (MULTI): Primary | ICD-10-CM

## 2024-05-14 DIAGNOSIS — I10 ESSENTIAL HYPERTENSION: ICD-10-CM

## 2024-05-14 DIAGNOSIS — N28.89 NODULE OF KIDNEY: ICD-10-CM

## 2024-05-14 PROCEDURE — 3075F SYST BP GE 130 - 139MM HG: CPT | Performed by: INTERNAL MEDICINE

## 2024-05-14 PROCEDURE — 3008F BODY MASS INDEX DOCD: CPT | Performed by: INTERNAL MEDICINE

## 2024-05-14 PROCEDURE — 99204 OFFICE O/P NEW MOD 45 MIN: CPT | Performed by: INTERNAL MEDICINE

## 2024-05-14 PROCEDURE — 3079F DIAST BP 80-89 MM HG: CPT | Performed by: INTERNAL MEDICINE

## 2024-05-14 NOTE — PROGRESS NOTES
"Jamil Cisse   54 y.o.      Vitals:    05/14/24 1529   Weight: 105 kg (232 lb)      MRN/Room: [id number]/Room/bed info not found      History Of Present Illness  Jamil Cisse \"Mau\" is a 54 y.o. male presenting with high creatinine level.     Past Medical History  He has a past medical history of Abnormal ECG (09/16/2022), Arthritis, Cholesteatoma of middle ear and mastoid, GERD (gastroesophageal reflux disease), Gout, Heart murmur, History of PFTs (06/16/2023), HL (hearing loss), Hyperlipidemia, Hypertension, Lung nodules, Personal history of infections of the central nervous system (02/2018), PONV (postoperative nausea and vomiting), Reactive airway disease ([facility]), Renal cancer (Multi) (2003), Renal mass, left, Shortness of breath, Sinusitis, Sleep apnea, Thoracic ascending aortic aneurysm (CMS-HCC) (02/21/2024), Ulcerative colitis (Multi), and Vision loss.    Surgical History  He has a past surgical history that includes Nephrectomy (Right, 2005); Other surgical history; Total hip arthroplasty (Bilateral, 05/2017); Other surgical history (07/09/2020); Other surgical history (07/09/2020); Other surgical history (07/09/2020); Rotator cuff repair (Right, 09/23/2022); Vasectomy; Colonoscopy; CT chest wo IV contrast (02/20/2024); US carotid doppler left (2018); and Hernia repair.     Social History  He reports that he has quit smoking. His smoking use included cigarettes. He has a 6 pack-year smoking history. He has never used smokeless tobacco. He reports current alcohol use of about 4.0 standard drinks of alcohol per week. He reports that he does not use drugs.    Family History  Family History   Problem Relation Name Age of Onset    Hypertension Mother      Hypertension Father      Stroke Father          Allergies  Adhesive tape-silicones, Codeine, Sulfa (sulfonamide antibiotics), Adhesive, Amlodipine besylate, Lisinopril, Sumatriptan, and Valsartan    Meds:         Current Outpatient Medications "   Medication Sig Dispense Refill    albuterol (Ventolin HFA) 90 mcg/actuation inhaler Inhale 2 puffs every 6 hours if needed for wheezing. 18 g 3    aspirin 81 mg EC tablet Take 1 tablet (81 mg) by mouth once daily. 90 tablet 0    aspirin-acetaminophen-caffeine (Excedrin Extra Strength) 250-250-65 mg tablet Take 1 tablet by mouth every 6 hours if needed.      atorvastatin (Lipitor) 20 mg tablet Take 1 tablet (20 mg) by mouth once daily. 90 tablet 0    certolizumab pegol (Cimzia Powder for Reconst) injection Inject 1 mL (200 mg) under the skin every 14 (fourteen) days.      cetirizine (ZyrTEC) 10 mg tablet Take by mouth once daily.      clindamycin (Cleocin) 300 mg capsule TAKE 2 CAPSULES BY MOUTH ONE HOUR BEFORE APPOINTMENT      colchicine 0.6 mg tablet Take 1 tablet (0.6 mg) by mouth once daily. 90 tablet 0    febuxostat (Uloric) 40 mg tablet Take 1 tablet (40 mg) by mouth once daily. 90 tablet 0    fexofenadine (Allegra) 180 mg tablet Take 1 tablet (180 mg) by mouth once daily.      fluticasone (Flonase) 50 mcg/actuation nasal spray Administer 2 sprays into each nostril once daily. 48 g 0    montelukast (Singulair) 10 mg tablet Take 1 tablet (10 mg) by mouth once daily at bedtime. 90 tablet 1    omeprazole (PriLOSEC) 40 mg DR capsule Take 1 capsule (40 mg) by mouth once daily. 90 capsule 0    telmisartan-hydrochlorothiazid (MIcarDIS HCT) 80-25 mg tablet Take 1 tablet by mouth once daily. 90 tablet 0    Xiidra 5 % dropperette INSTILL 1 DROP IN BOTH EYES TWICE A DAY      Maxalt-MLT 10 mg disintegrating tablet Take 1 tablet (10 mg) by mouth 1 time if needed for migraine. May repeat in 2 hours if unresolved. Do not exceed 30 mg in 24 hours. 30 tablet 0    miscellaneous medical supply (Blood Pressure Cuff) misc 1 kit if needed (as needed). (Patient not taking: Reported on 5/14/2024) 1 each 0    predniSONE (Deltasone) 5 mg tablet Take 5 tabs (25mg) daily for 7 days, then 4 tabs (20mg) daily for 7 days,  3 tabs (15mg)  daily for 7 days, 2 tabs (10 mg) daily for 7 days, then 1 tab (5mg) for 7 days. 105 tablet 0     No current facility-administered medications for this visit.         ROS:  The patient is awake and oriented. No dizziness or lightheadedness. No chills and no fever. No headaches. No nausea and no vomiting. No shortness of breath. No cough. No sputum. No chest pain. No chest tightness. No abdominal pain. No diarrhea and no constipation. No hematemesis or hemoptysis. No hematuria. No rectal bleeding. No melena. No epistaxis. No urinary symptoms. No flank pain. No leg edema. No leg pain. No weakness. No itching. Overall, the rest of the review of systems is also negative.  12 point review of systems otherwise negative as stated in HPI.        Physical Exam:        Vitals:    05/14/24 1529   BP: 130/82   Pulse: 87     General: The patient is awake, oriented, and is not in any distress.  Head and Neck: Normocephalic. No periorbital edema.  Respiratory: Symmetric air entry. Symmetric chest expansion.No respiratory distress.  Cardiovascular: Symmetric peripheral pulses.  Skin: No maculopapular rash.  Musculoskeletal: No peripheral edema in both left and right upper extremities.  No edema in either left or right lower extremities.  Neuro Exam: Speech is fluent. Moves extremities.        Blood Labs:  No results found for this or any previous visit (from the past 24 hour(s)).   Lab Results   Component Value Date    GLUCOSE 73 (L) 03/08/2024    CALCIUM 9.8 03/08/2024     03/08/2024    K 4.1 03/08/2024    CO2 26 03/08/2024     03/08/2024    BUN 21 03/08/2024    CREATININE 1.48 (H) 03/08/2024       Imaging:  === 02/29/24 ===    US RENAL COMPLETE    - Impression -  Previous right nephrectomy.    No left hydronephrosis.    No focal urinary bladder abnormality identified.    MACRO:  None.    Signed by: Balaji Robledo 3/1/2024 2:09 PM  Dictation workstation:   [id number]      Assessment and Plan:  #1 chronic kidney  disease stage III.  Last creatinine was 1.4.  He has history of renal cell carcinoma status post right radical nephrectomy back in 2003.  No major change in creatinine level over the past few years.  He had a recent kidney ultrasound which did not show any major problem.  His left kidney has adequate compensation and it is measured 14.8 cm.  I asked for microscopic urinalysis, spot urine protein to creatinine ratio, PTH, phosphorus, and 25-hydroxy vitamin D level.    2.  Hypertension.  Blood pressure is under control.  Continue the current medications.        Tien Kemp MD  Senior Attending Physician  Director of Onco-Nephrology Program  Division of Nephrology & Hypertension  [facility]

## 2024-05-15 ENCOUNTER — LAB (OUTPATIENT)
Dept: LAB | Facility: LAB | Age: 54
End: 2024-05-15
Payer: COMMERCIAL

## 2024-05-15 DIAGNOSIS — I10 ESSENTIAL HYPERTENSION: ICD-10-CM

## 2024-05-15 DIAGNOSIS — N18.31 STAGE 3A CHRONIC KIDNEY DISEASE (MULTI): ICD-10-CM

## 2024-05-15 LAB
25(OH)D3 SERPL-MCNC: 50 NG/ML (ref 30–100)
ANION GAP SERPL CALC-SCNC: 12 MMOL/L (ref 10–20)
BUN SERPL-MCNC: 28 MG/DL (ref 6–23)
CALCIUM SERPL-MCNC: 9.5 MG/DL (ref 8.6–10.3)
CHLORIDE SERPL-SCNC: 104 MMOL/L (ref 98–107)
CO2 SERPL-SCNC: 27 MMOL/L (ref 21–32)
CREAT SERPL-MCNC: 1.77 MG/DL (ref 0.5–1.3)
CREAT UR-MCNC: 73.2 MG/DL (ref 20–370)
EGFRCR SERPLBLD CKD-EPI 2021: 45 ML/MIN/1.73M*2
GLUCOSE SERPL-MCNC: 86 MG/DL (ref 74–99)
MUCOUS THREADS #/AREA URNS AUTO: NORMAL /LPF
POTASSIUM SERPL-SCNC: 3.8 MMOL/L (ref 3.5–5.3)
PROT UR-ACNC: 11 MG/DL (ref 5–25)
PROT/CREAT UR: 0.15 MG/MG CREAT (ref 0–0.17)
RBC #/AREA URNS AUTO: NORMAL /HPF
SODIUM SERPL-SCNC: 139 MMOL/L (ref 136–145)
WBC #/AREA URNS AUTO: NORMAL /HPF

## 2024-05-15 PROCEDURE — 83970 ASSAY OF PARATHORMONE: CPT

## 2024-05-15 PROCEDURE — 36415 COLL VENOUS BLD VENIPUNCTURE: CPT

## 2024-05-15 PROCEDURE — 81001 URINALYSIS AUTO W/SCOPE: CPT

## 2024-05-15 PROCEDURE — 82570 ASSAY OF URINE CREATININE: CPT

## 2024-05-15 PROCEDURE — 80048 BASIC METABOLIC PNL TOTAL CA: CPT

## 2024-05-15 PROCEDURE — 84156 ASSAY OF PROTEIN URINE: CPT

## 2024-05-15 PROCEDURE — 82306 VITAMIN D 25 HYDROXY: CPT

## 2024-05-16 LAB — PTH-INTACT SERPL-MCNC: 17.9 PG/ML (ref 18.5–88)

## 2024-05-21 ENCOUNTER — CLINICAL SUPPORT (OUTPATIENT)
Dept: ALLERGY | Facility: CLINIC | Age: 54
End: 2024-05-21
Payer: COMMERCIAL

## 2024-05-21 DIAGNOSIS — J30.2 SEASONAL ALLERGIES: ICD-10-CM

## 2024-05-21 PROCEDURE — 95117 IMMUNOTHERAPY INJECTIONS: CPT | Performed by: ALLERGY & IMMUNOLOGY

## 2024-05-28 ENCOUNTER — APPOINTMENT (OUTPATIENT)
Dept: CARDIOLOGY | Facility: CLINIC | Age: 54
End: 2024-05-28
Payer: COMMERCIAL

## 2024-05-29 ENCOUNTER — CLINICAL SUPPORT (OUTPATIENT)
Dept: PREADMISSION TESTING | Facility: HOSPITAL | Age: 54
End: 2024-05-29
Payer: COMMERCIAL

## 2024-05-29 NOTE — CPM/PAT NURSE NOTE
"CPM/PAT Nurse Note      Name: Jamil Cisse (Jamil Cisse \"Mau\")  /Age: 1970/54 y.o.       Past Medical History:   Diagnosis Date    Abnormal ECG 2022    Normal sinus rhythm Nonspecific T wave abnormality    Arthritis     Bicuspid aortic valve (HHS-HCC)     Cholesteatoma of middle ear and mastoid     CKD (chronic kidney disease)     stage 3, seen by Dr. Kemp (Nephology)    Dilated aortic root (CMS-HCC)     Minimally dilated aortic root 39 mm.    Diverticulosis     GERD (gastroesophageal reflux disease)     Gout     Heart murmur     Echo scheduled for 24    History of PFTs 2023    Dyspnea; Mild restriction on PFTs    HL (hearing loss)     Hyperlipidemia     Hypertension     Lung nodules     Nonspecific 1.9 cm right upper lobe part solid nodule with mediastinal lymphadenopathy    Mild aortic valve regurgitation     Obesity     Personal history of infections of the central nervous system 2018    History of Herpes Encephalitis    PONV (postoperative nausea and vomiting)     Reactive airway disease ([facility]-HCC)     Renal cancer (Multi)     s/p right radical nephrectomy in .    Renal mass, left     Inadequately characterized 2.6 cm left renal mass. Consider further evaluation with renal US    Shortness of breath     seen by LJ Baptiste (pulmonology)    Sinusitis     Sleep apnea     Uses CPAP    Thoracic ascending aortic aneurysm (CMS-HCC) 2024    4.3cm per CT scan on 24    Ulcerative colitis (Multi)     Treated with Cimzia    Vision loss     Wears contacts       Past Surgical History:   Procedure Laterality Date    COLONOSCOPY      CT CHEST WO IV CONTRAST  2024    Nonspecific 1.9 cm right upper lobe part solid nodule with mediastinal lymphadenopathy.Ascending thoracic aortic aneurysm, 4.3 cm.    HERNIA REPAIR      NEPHRECTOMY Right     Right total nephrectomy    OTHER SURGICAL HISTORY      Left Shoulder surgery    OTHER SURGICAL HISTORY  " 07/09/2020    Ear surgery x3    OTHER SURGICAL HISTORY  07/09/2020    Finger surgical procedure    OTHER SURGICAL HISTORY  07/09/2020    Throat surgery    ROTATOR CUFF REPAIR Right 09/23/2022    Right rotator cuff repair with subacromial decompression    TOTAL HIP ARTHROPLASTY Bilateral 05/2017    Bilateral Hip replacement    US CAROTID DOPPLER LEFT  2018    NO HEMODYNAMICALLY SIGNIFICANT VASCULAR STENOSIS IN THE CAROTID AND VERTEBRAL ARTERIES IN THE NECK    VASECTOMY         Patient Sexual activity questions deferred to the physician.    Family History   Problem Relation Name Age of Onset    Hypertension Mother      Hypertension Father      Stroke Father         Allergies   Allergen Reactions    Adhesive Tape-Silicones Other and Unknown     skin pulled off    Codeine Unknown    Sulfa (Sulfonamide Antibiotics) Other    Adhesive Other and Unknown     Pulls skin off    Amlodipine Besylate Rash     Flush    Lisinopril Other     ineffective    Sumatriptan Other     ineffective    Valsartan Other     ineffective       Prior to Admission medications    Medication Sig Start Date End Date Taking? Authorizing Provider   albuterol (Ventolin HFA) 90 mcg/actuation inhaler Inhale 2 puffs every 6 hours if needed for wheezing. 1/4/24   JACK Licea-CNP   aspirin 81 mg EC tablet Take 1 tablet (81 mg) by mouth once daily. 3/6/24   Jocelyne Farris PA-C   aspirin-acetaminophen-caffeine (Excedrin Extra Strength) 250-250-65 mg tablet Take 1 tablet by mouth every 6 hours if needed.    Historical Provider, MD   atorvastatin (Lipitor) 20 mg tablet Take 1 tablet (20 mg) by mouth once daily. 3/11/24   Leo Flores DO   certolizumab pegol (Cimzia Powder for Reconst) injection Inject 1 mL (200 mg) under the skin every 14 (fourteen) days. 6/26/18   Historical Provider, MD   cetirizine (ZyrTEC) 10 mg tablet Take by mouth once daily.    Historical Provider, MD   clindamycin (Cleocin) 300 mg capsule TAKE 2 CAPSULES BY MOUTH ONE HOUR  BEFORE APPOINTMENT 4/15/24   Historical Provider, MD   colchicine 0.6 mg tablet Take 1 tablet (0.6 mg) by mouth once daily. 4/1/24   Leo Flores DO   febuxostat (Uloric) 40 mg tablet Take 1 tablet (40 mg) by mouth once daily. 4/1/24   Leo Flores DO   fexofenadine (Allegra) 180 mg tablet Take 1 tablet (180 mg) by mouth once daily.    Historical Provider, MD   fluticasone (Flonase) 50 mcg/actuation nasal spray Administer 2 sprays into each nostril once daily. 5/8/24   Leo Flores DO   Maxalt-MLT 10 mg disintegrating tablet Take 1 tablet (10 mg) by mouth 1 time if needed for migraine. May repeat in 2 hours if unresolved. Do not exceed 30 mg in 24 hours. 5/22/23 4/24/24  Jocelyne Farris PA-C   montelukast (Singulair) 10 mg tablet Take 1 tablet (10 mg) by mouth once daily at bedtime. 3/19/24   Donita De La Cruz MD   omeprazole (PriLOSEC) 40 mg DR capsule Take 1 capsule (40 mg) by mouth once daily. 3/18/24   Jocelyne Farris PA-C   telmisartan-hydrochlorothiazid (MIcarDIS HCT) 80-25 mg tablet Take 1 tablet by mouth once daily. 3/6/24   Jocelyne Farris PA-C   Xiidra 5 % dropperette INSTILL 1 DROP IN BOTH EYES TWICE A DAY    Historical Provider, MD PEDRO FISCHER Risk Score    No data to display       Caprini DVT Assessment    No data to display       Modified Frailty Index    No data to display       CHADS2 Stroke Risk  Current as of 30 minutes ago        N/A 3 to 100%: High Risk   2 to < 3%: Medium Risk   0 to < 2%: Low Risk     Last Change: N/A          This score determines the patient's risk of having a stroke if the patient has atrial fibrillation.        This score is not applicable to this patient. Components are not calculated.          Revised Cardiac Risk Index    No data to display       Apfel Simplified Score    No data to display       Risk Analysis Index Results This Encounter    No data found in the last 1 encounters.     Scheduled for ossiculoplasty-left excision cartilage graft  donor site ear-left on 24 with Dr. Brizuela.  PCP: Jocelyne Farris PA-C  LOV 24/ Leo Flores DO LOV 24  Cardiology: Kemi Gonzalez MD LOV 24 seen for preoperative cardiac risk assessment  -Exercise Stress Test: 24  IMPRESSION:  Normal exercise Myoview cardiac perfusion stress test.  No evidence of ischemia or myocardial infarction by perfusion imaging.  Normal left ventricular systolic function, ejection fraction 54%.  No exercise provoked significant ischemic ECG changes or chest pain  symptoms. No comparison study was available.  Clinical correlation is advised..    -EC24 NSR normal EKG  -ECHO (TTE): 24:   CONCLUSIONS:   1. Left ventricular systolic function is normal with a 55-60% estimated ejection fraction.   2. There is moderate concentric left ventricular hypertrophy.   3. Normal RV size and function      RVSP could not be calculated as no significant TR Doppler envelope.   4. Normal mitral valve.   5. Aortic valve appears abnormal.   6. Probable bicuspid aortic valve which is densely calcified. The right coronary cusp leaflet is essentially immobile. There is mild aortic stenosis V-max 2.6 m/s mean gradient 18 mmHg valve area ~1.3 cm sq. 1+ eccentric AI.   7. Mild aortic valve regurgitation.   8. Minimally dilated aortic root 39 mm. Ascending aorta mildly dilated 42 mm.    Pulmonology: Trish Ruiz, APRN-CNP  LOV 03/15/24 seen for evaluation for SOB.  -CT Chest 24:  Indication Restrictive lung disease  IMPRESSION:  Nonspecific 1.9 cm right upper lobe part solid nodule with  mediastinal lymphadenopathy. Consider further evaluation with PET-CT.    -PET CT: 24: Indication Lung Nodule   IMPRESSION:  1. Previously seen irregular opacity within the right upper lobe  appears less prominent as compared to prior, without increased  metabolic activity. There are however multiple hypermetabolic  prominent to mildly enlarged mediastinal and hilar lymph nodes.  This  is nonspecific. Findings likely suggest an infectious/inflammatory  process, however malignant involvement can not be definitively  excluded. Recommend short-term follow-up with dedicated CT of the  chest.  2. 2.4 cm exophytic lesion within the interpolar region of the left  kidney, with density slightly higher than fluid density, however with  no increased metabolic activity. Please note that FDG PET is not  sensitive for detecting RCC. Recommend 3 phase renal CT or renal MRI  for further evaluation, especially with patient's known history of  right-sided RCC.  3. Nonspecific increased metabolic activity within the proximal left  upper arm, could represent inflammation at a tendinous insertion  site. However, findings are nonspecific and recommend correlation  with symptoms and dedicated imaging if clinically indicated.    -PFT  Nephrology: Tien Kemp MD  LOV 05/14/24 seen for CKD.    Nurse Plan of Action:   Requested pre-op risk stratification from Dr. Gonzalez VIA staff message, CPM NP included on message.   ONLY    Hue Reynoso RN  Pre-Admission Testing

## 2024-06-01 ENCOUNTER — APPOINTMENT (OUTPATIENT)
Dept: RADIOLOGY | Facility: CLINIC | Age: 54
End: 2024-06-01
Payer: COMMERCIAL

## 2024-06-03 ENCOUNTER — APPOINTMENT (OUTPATIENT)
Dept: RADIOLOGY | Facility: CLINIC | Age: 54
End: 2024-06-03
Payer: COMMERCIAL

## 2024-06-03 DIAGNOSIS — E78.2 HYPERLIPEMIA, MIXED: ICD-10-CM

## 2024-06-03 DIAGNOSIS — I10 HTN (HYPERTENSION), BENIGN: ICD-10-CM

## 2024-06-04 ENCOUNTER — CLINICAL SUPPORT (OUTPATIENT)
Dept: ALLERGY | Facility: CLINIC | Age: 54
End: 2024-06-04
Payer: COMMERCIAL

## 2024-06-04 DIAGNOSIS — J30.2 SEASONAL ALLERGIES: ICD-10-CM

## 2024-06-04 PROCEDURE — 95117 IMMUNOTHERAPY INJECTIONS: CPT | Performed by: ALLERGY & IMMUNOLOGY

## 2024-06-05 ENCOUNTER — PRE-ADMISSION TESTING (OUTPATIENT)
Dept: PREADMISSION TESTING | Facility: HOSPITAL | Age: 54
End: 2024-06-05
Payer: COMMERCIAL

## 2024-06-05 VITALS
DIASTOLIC BLOOD PRESSURE: 86 MMHG | SYSTOLIC BLOOD PRESSURE: 126 MMHG | HEART RATE: 100 BPM | WEIGHT: 226.9 LBS | BODY MASS INDEX: 33.61 KG/M2 | TEMPERATURE: 98.7 F | OXYGEN SATURATION: 95 % | HEIGHT: 69 IN

## 2024-06-05 DIAGNOSIS — Z01.818 PREOPERATIVE EXAMINATION: Primary | ICD-10-CM

## 2024-06-05 DIAGNOSIS — Z01.818 PRE-OP TESTING: ICD-10-CM

## 2024-06-05 LAB
ANION GAP SERPL CALC-SCNC: 17 MMOL/L (ref 10–20)
BUN SERPL-MCNC: 24 MG/DL (ref 6–23)
CALCIUM SERPL-MCNC: 10.6 MG/DL (ref 8.6–10.6)
CHLORIDE SERPL-SCNC: 104 MMOL/L (ref 98–107)
CO2 SERPL-SCNC: 26 MMOL/L (ref 21–32)
CREAT SERPL-MCNC: 1.59 MG/DL (ref 0.5–1.3)
EGFRCR SERPLBLD CKD-EPI 2021: 51 ML/MIN/1.73M*2
ERYTHROCYTE [DISTWIDTH] IN BLOOD BY AUTOMATED COUNT: 18.6 % (ref 11.5–14.5)
GLUCOSE SERPL-MCNC: 90 MG/DL (ref 74–99)
HCT VFR BLD AUTO: 44.4 % (ref 41–52)
HGB BLD-MCNC: 13.7 G/DL (ref 13.5–17.5)
MCH RBC QN AUTO: 23.3 PG (ref 26–34)
MCHC RBC AUTO-ENTMCNC: 30.9 G/DL (ref 32–36)
MCV RBC AUTO: 76 FL (ref 80–100)
NRBC BLD-RTO: 0 /100 WBCS (ref 0–0)
PLATELET # BLD AUTO: 269 X10*3/UL (ref 150–450)
POTASSIUM SERPL-SCNC: 4.6 MMOL/L (ref 3.5–5.3)
RBC # BLD AUTO: 5.88 X10*6/UL (ref 4.5–5.9)
SODIUM SERPL-SCNC: 142 MMOL/L (ref 136–145)
WBC # BLD AUTO: 4.8 X10*3/UL (ref 4.4–11.3)

## 2024-06-05 PROCEDURE — 80048 BASIC METABOLIC PNL TOTAL CA: CPT

## 2024-06-05 PROCEDURE — 36415 COLL VENOUS BLD VENIPUNCTURE: CPT

## 2024-06-05 PROCEDURE — 85027 COMPLETE CBC AUTOMATED: CPT

## 2024-06-05 PROCEDURE — 99215 OFFICE O/P EST HI 40 MIN: CPT | Performed by: NURSE PRACTITIONER

## 2024-06-05 RX ORDER — TELMISARTAN AND HYDROCHLORTHIAZIDE 80; 25 MG/1; MG/1
1 TABLET ORAL DAILY
Qty: 90 TABLET | Refills: 0 | Status: SHIPPED | OUTPATIENT
Start: 2024-06-05

## 2024-06-05 RX ORDER — ASPIRIN 81 MG/1
81 TABLET ORAL DAILY
Qty: 90 TABLET | Refills: 0 | Status: SHIPPED | OUTPATIENT
Start: 2024-06-05

## 2024-06-05 RX ORDER — ATORVASTATIN CALCIUM 20 MG/1
20 TABLET, FILM COATED ORAL DAILY
Qty: 90 TABLET | Refills: 0 | Status: SHIPPED | OUTPATIENT
Start: 2024-06-05

## 2024-06-05 ASSESSMENT — ENCOUNTER SYMPTOMS
NEUROLOGICAL NEGATIVE: 1
GASTROINTESTINAL NEGATIVE: 1
CARDIOVASCULAR NEGATIVE: 1
EYES NEGATIVE: 1
RESPIRATORY NEGATIVE: 1
CONSTITUTIONAL NEGATIVE: 1
MUSCULOSKELETAL NEGATIVE: 1
NECK NEGATIVE: 1
ENDOCRINE NEGATIVE: 1

## 2024-06-05 ASSESSMENT — DUKE ACTIVITY SCORE INDEX (DASI)
CAN YOU WALK A BLOCK OR TWO ON LEVEL GROUND: YES
CAN YOU PARTICIPATE IN STRENOUS SPORTS LIKE SWIMMING, SINGLES TENNIS, FOOTBALL, BASKETBALL, OR SKIING: YES
CAN YOU TAKE CARE OF YOURSELF (EAT, DRESS, BATHE, OR USE TOILET): YES
CAN YOU DO YARD WORK LIKE RAKING LEAVES, WEEDING OR PUSHING A MOWER: YES
CAN YOU DO HEAVY WORK AROUND THE HOUSE LIKE SCRUBBING FLOORS OR LIFTING AND MOVING HEAVY FURNITURE: YES
CAN YOU DO MODERATE WORK AROUND THE HOUSE LIKE VACUUMING, SWEEPING FLOORS OR CARRYING GROCERIES: YES
DASI METS SCORE: 9.9
CAN YOU PARTICIPATE IN MODERATE RECREATIONAL ACTIVITIES LIKE GOLF, BOWLING, DANCING, DOUBLES TENNIS OR THROWING A BASEBALL OR FOOTBALL: YES
CAN YOU RUN A SHORT DISTANCE: YES
CAN YOU DO LIGHT WORK AROUND THE HOUSE LIKE DUSTING OR WASHING DISHES: YES
CAN YOU WALK INDOORS, SUCH AS AROUND YOUR HOUSE: YES
CAN YOU CLIMB A FLIGHT OF STAIRS OR WALK UP A HILL: YES
TOTAL_SCORE: 58.2
CAN YOU HAVE SEXUAL RELATIONS: YES

## 2024-06-05 ASSESSMENT — LIFESTYLE VARIABLES: SMOKING_STATUS: NONSMOKER

## 2024-06-05 NOTE — CPM/PAT H&P
"CPM/PAT Evaluation       Name: Jamil Cisse (Jamil Cisse \"Mau\")  /Age: 1970/54 y.o.     Visit Type:   In-Person       Chief Complaint: Left mastoid cholesteatoma scheduled for surgery    HPI: Patient is a 54-year-old male scheduled for left ossiculoplasty with left cartilage excision on 2024 for treatment of left mastoid cholesteatoma.  The patient is referred by Dr. Jamil Brizuela for preoperative evaluation of osteoarthritis, bicuspid aortic valve, left mastoid cholesteatoma, CKD stage III following right radical nephrectomy in  for treatment of renal cell carcinoma, 3.9 cm dilated aortic root, GERD, gout with no recent flares, hypertension, hyperlipidemia, lung nodule under surveillance, reactive airway disease, WILFRID compliant with CPAP 4.3 cm thoracic ascending aortic aneurysm, ulcerative colitis managed on Cimzia.    Past Medical History:   Diagnosis Date    Abnormal ECG 2022    Normal sinus rhythm Nonspecific T wave abnormality    Arthritis     Bicuspid aortic valve (HHS-HCC)     Cholesteatoma of middle ear and mastoid     CKD (chronic kidney disease)     stage 3, seen by Dr. Kemp (Nephology)    Dilated aortic root (CMS-HCC)     Minimally dilated aortic root 39 mm.    Diverticulosis     GERD (gastroesophageal reflux disease)     Gout     Heart murmur     Echo scheduled for 24    History of PFTs 2023    Dyspnea; Mild restriction on PFTs    HL (hearing loss)     Hyperlipidemia     Hypertension     Lung nodules     Nonspecific 1.9 cm right upper lobe part solid nodule with mediastinal lymphadenopathy    Mild aortic valve regurgitation     Obesity     Personal history of infections of the central nervous system 2018    History of Herpes Encephalitis    PONV (postoperative nausea and vomiting)     Reactive airway disease (HHS-HCC)     Renal cancer (Multi)     s/p right radical nephrectomy in .    Renal mass, left     Inadequately characterized 2.6 cm left " renal mass. Consider further evaluation with renal US    Shortness of breath     seen by JACK Baptiste-CNP (pulmonology)    Sinusitis     Sleep apnea     Uses CPAP    Thoracic ascending aortic aneurysm (CMS-HCC) 02/21/2024    4.3cm per CT scan on 02/21/24    Ulcerative colitis (Multi)     Treated with Cimzia    Vision loss     Wears contacts       Past Surgical History:   Procedure Laterality Date    COLONOSCOPY      CT CHEST WO IV CONTRAST  02/20/2024    Nonspecific 1.9 cm right upper lobe part solid nodule with mediastinal lymphadenopathy.Ascending thoracic aortic aneurysm, 4.3 cm.    HERNIA REPAIR      NEPHRECTOMY Right 2005    Right total nephrectomy    OTHER SURGICAL HISTORY      Left Shoulder surgery    OTHER SURGICAL HISTORY  07/09/2020    Ear surgery x3    OTHER SURGICAL HISTORY  07/09/2020    Finger surgical procedure    OTHER SURGICAL HISTORY  07/09/2020    Throat surgery    ROTATOR CUFF REPAIR Right 09/23/2022    Right rotator cuff repair with subacromial decompression    TOTAL HIP ARTHROPLASTY Bilateral 05/2017    Bilateral Hip replacement    US CAROTID DOPPLER LEFT  2018    NO HEMODYNAMICALLY SIGNIFICANT VASCULAR STENOSIS IN THE CAROTID AND VERTEBRAL ARTERIES IN THE NECK    VASECTOMY         Patient Sexual activity questions deferred to the physician.    Family History   Problem Relation Name Age of Onset    Hypertension Mother      Hypertension Father      Stroke Father         Allergies   Allergen Reactions    Adhesive Tape-Silicones Other and Unknown     skin pulled off    Codeine Unknown    Sulfa (Sulfonamide Antibiotics) Other    Adhesive Other and Unknown     Pulls skin off    Amlodipine Besylate Rash     Flush    Lisinopril Other     ineffective    Sumatriptan Other     ineffective    Valsartan Other     ineffective       Prior to Admission medications    Medication Sig Start Date End Date Taking? Authorizing Provider   aspirin 81 mg EC tablet Take 1 tablet (81 mg) by mouth once daily.  3/6/24  Yes Jocelyne Farris PA-C   atorvastatin (Lipitor) 20 mg tablet Take 1 tablet (20 mg) by mouth once daily. 3/11/24  Yes Leo Flores,    cetirizine (ZyrTEC) 10 mg tablet Take by mouth once daily.   Yes Historical Provider, MD   colchicine 0.6 mg tablet Take 1 tablet (0.6 mg) by mouth once daily. 4/1/24  Yes Leo Flores DO   febuxostat (Uloric) 40 mg tablet Take 1 tablet (40 mg) by mouth once daily. 4/1/24  Yes Leo Flores DO   fluticasone (Flonase) 50 mcg/actuation nasal spray Administer 2 sprays into each nostril once daily. 5/8/24  Yes Leo Flores DO   montelukast (Singulair) 10 mg tablet Take 1 tablet (10 mg) by mouth once daily at bedtime. 3/19/24  Yes Donita De La Cruz MD   omeprazole (PriLOSEC) 40 mg DR capsule Take 1 capsule (40 mg) by mouth once daily. 3/18/24  Yes Jocelyne Farris PA-C   telmisartan-hydrochlorothiazid (MIcarDIS HCT) 80-25 mg tablet Take 1 tablet by mouth once daily. 3/6/24  Yes Jocelyne Farris PA-C   Xiidra 5 % dropperette INSTILL 1 DROP IN BOTH EYES TWICE A DAY   Yes Historical Provider, MD   albuterol (Ventolin HFA) 90 mcg/actuation inhaler Inhale 2 puffs every 6 hours if needed for wheezing. 1/4/24   JACK Licea-CNP   aspirin-acetaminophen-caffeine (Excedrin Extra Strength) 250-250-65 mg tablet Take 1 tablet by mouth every 6 hours if needed.    Historical Provider, MD   certolizumab pegol (Cimzia Powder for Reconst) injection Inject 1 mL (200 mg) under the skin every 14 (fourteen) days. 6/26/18   Historical Provider, MD   clindamycin (Cleocin) 300 mg capsule TAKE 2 CAPSULES BY MOUTH ONE HOUR BEFORE APPOINTMENT 4/15/24   Historical Provider, MD   Maxalt-MLT 10 mg disintegrating tablet Take 1 tablet (10 mg) by mouth 1 time if needed for migraine. May repeat in 2 hours if unresolved. Do not exceed 30 mg in 24 hours. 5/22/23 4/24/24  Jocelyne Farris PA-C   fexofenadine (Allegra) 180 mg tablet Take 1 tablet (180 mg) by mouth once daily.  6/5/24   Historical Provider, MD VASQUEZ ROS:   Constitutional:   neg    Neuro/Psych:   neg    Eyes:   neg     use of corrective lenses  Ears:    hearing loss (left ear)  Nose:   neg    Mouth:   neg    Throat:   neg    Neck:   neg    Cardio:   neg    Respiratory:   neg    Endocrine:   neg    GI:   neg    :   neg    Musculoskeletal:   neg    Hematologic:   neg    Skin:  neg        Physical Exam  Vitals reviewed.   Constitutional:       Appearance: Normal appearance.      Comments: Beard and mustache   HENT:      Head: Normocephalic.      Nose: Nose normal.      Mouth/Throat:      Mouth: Mucous membranes are moist.   Eyes:      Conjunctiva/sclera: Conjunctivae normal.   Neck:      Vascular: No carotid bruit.   Cardiovascular:      Rate and Rhythm: Normal rate and regular rhythm.      Pulses: Normal pulses.      Heart sounds: Murmur heard.      Systolic murmur is present with a grade of 2/6.      Comments: Right sternal border 2/6 systolic murmur with radiation to left sternal border  Pulmonary:      Effort: Pulmonary effort is normal.      Breath sounds: Normal breath sounds.   Abdominal:      Palpations: Abdomen is soft.      Tenderness: There is no abdominal tenderness.   Musculoskeletal:         General: Normal range of motion.      Cervical back: Normal range of motion.      Right lower leg: No edema.      Left lower leg: No edema.   Lymphadenopathy:      Cervical: No cervical adenopathy.   Skin:     General: Skin is warm and dry.      Capillary Refill: Capillary refill takes less than 2 seconds.   Neurological:      General: No focal deficit present.      Mental Status: He is alert and oriented to person, place, and time.   Psychiatric:         Mood and Affect: Mood normal.         Behavior: Behavior normal. Behavior is cooperative.         Thought Content: Thought content normal.         Judgment: Judgment normal.          PAT AIRWAY:   Airway:     Mallampati::  IV    Neck ROM::  Full   partials      Visit  Vitals  /86   Pulse 100   Temp 37.1 °C (98.7 °F)       DASI Risk Score      Flowsheet Row Most Recent Value   DASI SCORE 58.2   METS Score (Will be calculated only when all the questions are answered) 9.9          Caprini DVT Assessment      Flowsheet Row Most Recent Value   DVT Score 11   Current Status Major surgery planned, lasting 2-3 hours   History Prior major surgery, Inflammatory bowel disease, Previous malignancy   Age 40-59 years   BMI 31-40 (Obesity)          Modified Frailty Index      Flowsheet Row Most Recent Value   Modified Frailty Index Calculator .0909          CHADS2 Stroke Risk  Current as of 5 minutes ago        N/A 3 to 100%: High Risk   2 to < 3%: Medium Risk   0 to < 2%: Low Risk     Last Change: N/A          This score determines the patient's risk of having a stroke if the patient has atrial fibrillation.        This score is not applicable to this patient. Components are not calculated.          Revised Cardiac Risk Index      Flowsheet Row Most Recent Value   Revised Cardiac Risk Calculator 0          Apfel Simplified Score      Flowsheet Row Most Recent Value   Apfel Simplified Score Calculator 3          Risk Analysis Index Results This Encounter    No data found in the last 1 encounters.       Stop Bang Score      Flowsheet Row Most Recent Value   Do you snore loudly? 1   Do you often feel tired or fatigued after your sleep? 1   Has anyone ever observed you stop breathing in your sleep? 1   Do you have or are you being treated for high blood pressure? 1   Recent BMI (Calculated) 34.2   Is BMI greater than 35 kg/m2? 0=No   Age older than 50 years old? 1=Yes   Is your neck circumference greater than 17 inches (Male) or 16 inches (Female)? 1   Gender - Male 1=Yes   STOP-BANG Total Score 7              Assessment and Plan:   Neuro:  migraine headaches managed on Excedrin Migraine (hold 7 days) and Maxalt (hold day of surgery). Preoperative brain exercise educational handout provided  to patient.    The patient is at an increased risk for perioperative stroke secondary to cardiac disease, HTN, HLD, and general anesthesia.     HEENT/Airway:  left mastoid cholesteatoma scheduled for surgery.    Cardiovascular:  bicuspid aortic valve current with mild aortic stenosis and mild aortic valve regurgitation with aortic valve area, VTI 1.42 cm² per echocardiogram on 02/29/2024.  3.9 cm dilated aortic root stable and under surveillance per echocardiogram on 02/20/2024.  Hypertension managed on telmisartan/hydrochlorothiazide (hold 24 hours). Hyperlipidemia managed on atorvastatin (continue). 4.2 cm thoracic ascending aortic aneurysm stable per echocardiogram on 04/29/2022 echocardiogram. Managed on 81 mg daily aspiring (continue). Cardiac stress test on 03/28/2024 negative for stress-induced ischemia.  Following with cardiology Dr. Kemi Gonzalez, last visit on 04/05/2024 at which time they discussed continuing aggressive blood pressure management to prevent further dilation of his aorta and is to follow-up in 1 year. No additional preoperative testing is currently indicated.    METS are 9.9    RCRI  0 which is 3.9% 30 day risk of MACE (risk for cardiac death, nonfatal myocardial infarction, and nonfactal cardiac arrest    BIJAN score which indicates a 1.2% risk of intraoperative or 30-day postoperative MACE      Pulmonary:  WILFRID compliant with CPAP.  Reactive airway disease managed on montelukast (continue) and as needed albuterol inhaler (continue if needed) which has not been used in the last 2 to 3 months.  1.9 cm subsolid lung nodule under surveillance (CT chest in 3 months) -stable per chest CT on 03/12/2024 however there were noted to be multiple hypermetabolic prominent to mildly enlarged mediastinal and hilar lymph nodes that were nonspecific and suggested a inflammatory process therefore patient was placed on steroid taper which was completed in April 2024-consider preoperative stress dosing.  Following with pulmonology, last see by JACK Ruiz on 03/15/24 and patient to follow up in 3 months with Dr. Amado which is scheduled for 2024. Normal PFTs 24. Preoperative deep breathing educational handout provided to patient.    ARISCAT:   27   points which is a intermediate (13.3%) risk of in-hospital post-op pulmonary complications     PRODIGY:  13  points which is a intermediate risk of post op opioid induced respiratory depression episodes    STOP BAN  points which is a high risk for moderate to severe WILFRID    Renal: CKD stage III following right radical nephrectomy in  for treatment of renal cell carcinoma.  Follows with nephrology Dr. Tien Kemp, last visit 24- patient stable with baseline creatinine around 1.4.  no changes to care made and patient to follow up in 6 months, which is scheduled.     The patient is at increased risk of perioperative renal complications secondary to male sex, HTN, and renal insuff (preop creat >1.2 mg/dl). Preventative measures include preoperative BP control- holding ARB 24 hours.    Endocrine:  gout with no recent flares managed on colchicine (continue) and febuxostat (continue).  Patient had steroid taper for respiratory reasons (see pulmonary section) ending in 2024-consider preoperative stress dosing.    Hematologic:   No diagnosis or significant findings on chart review or clinical presentation and evaluation however see below risk screening tool.  Preoperative DVT educational handout provided to patient.    Caprini Score:  11  points which is a highest risk of perioperative VTE    Gastrointestinal:   ulcerative colitis managed on Cimzia which she has not had since 2024 due to insurance reasons and unable to obtain.  Working to receive it.  Will update me if he plans to resume prior to surgery.  As of right now he has no history of recent flares and no signs and symptoms of oncoming flare.  GERD managed on omeprazole  (continue)    EAT-10 score of  0 - self-perceived oropharyngeal dysphagia scale (0-40)     Apfel: 3 points 61% risk for post operative N/V    Infectious disease:  No diagnosis or significant findings on chart review or clinical presentation and evaluation.      Musculoskeletal: Osteoarthritis managed on as needed pain relievers (hold NSAIDs 7 days).        Labs ordered  Results for orders placed or performed in visit on 06/05/24 (from the past 96 hour(s))   CBC   Result Value Ref Range    WBC 4.8 4.4 - 11.3 x10*3/uL    nRBC 0.0 0.0 - 0.0 /100 WBCs    RBC 5.88 4.50 - 5.90 x10*6/uL    Hemoglobin 13.7 13.5 - 17.5 g/dL    Hematocrit 44.4 41.0 - 52.0 %    MCV 76 (L) 80 - 100 fL    MCH 23.3 (L) 26.0 - 34.0 pg    MCHC 30.9 (L) 32.0 - 36.0 g/dL    RDW 18.6 (H) 11.5 - 14.5 %    Platelets 269 150 - 450 x10*3/uL   Basic Metabolic Panel   Result Value Ref Range    Glucose 90 74 - 99 mg/dL    Sodium 142 136 - 145 mmol/L    Potassium 4.6 3.5 - 5.3 mmol/L    Chloride 104 98 - 107 mmol/L    Bicarbonate 26 21 - 32 mmol/L    Anion Gap 17 10 - 20 mmol/L    Urea Nitrogen 24 (H) 6 - 23 mg/dL    Creatinine 1.59 (H) 0.50 - 1.30 mg/dL    eGFR 51 (L) >60 mL/min/1.73m*2    Calcium 10.6 8.6 - 10.6 mg/dL

## 2024-06-05 NOTE — PREPROCEDURE INSTRUCTIONS
Thank you for visiting [facility] ([facility]) today for your pre-procedure evaluation, you were seen by     Samantha Meeson, MSN, NP-C  Adult-Gerontology Nurse Practitioner II  Department of Anesthesiology and Perioperative Medicine  Main phone [phone]  Direct phone [phone]  Fax [phone]     This summary includes instructions and information to aid you during your perioperative period.  Please read carefully. If you have any questions about your visit today, please call the number listed above.  If you become ill or have any changes to your health before your surgery, please contact your primary care provider and alert your surgeon.    Preparing for your Surgery       Exercises  Preoperative Deep Breathing Exercises  Why it is important to do deep breathing exercises before my surgery?  Deep breathing exercises strengthen your breathing muscles.  This helps you to recover after your surgery and decreases the chance of breathing complications.  How are the deep breathing exercises done?  Sit straight with your back supported.  Breathe in deeply and slowly through your nose. Your lower rib cage should expand and your abdomen may move forward.  Hold that breath for 3 to 5 seconds.  Breathe out through pursed lips, slowly and completely.  Rest and repeat 10 times every hour while awake.  Rest longer if you become dizzy or lightheaded.       Incentive Spirometer   You were provided with an incentive spirometer in [facility], please follow the below instructions.   You were not provided an incentive spirometer in [facility], please disregard the incentive spirometer instructions  What is an incentive spirometer?  An incentive spirometer is a device used before and after surgery to “exercise” your lungs.  It helps you to take deeper breaths to expand your lungs.  Below is an example of a basic incentive spirometer.  The device you receive may differ slightly but they all function the  same.    Why do I need to use an incentive spirometer?  Using your incentive spirometer prepares your lungs for surgery and helps prevent lung problems after surgery.  How do I use my incentive spirometer?  When you're using your incentive spirometer, make sure to breathe through your mouth. If you breathe through your nose, the incentive spirometer won't work properly. You can hold your nose if you have trouble.  If you feel dizzy at any time, stop and rest. Try again at a later time.  Follow the steps below:  Set up your incentive spirometer, expand the flexible tubing and connect to the outlet.  Sit upright in a chair or bed. Hold the incentive spirometer at eye level.   Put the mouthpiece in your mouth and close your lips tightly around it. Slowly breathe out (exhale) completely.  Breathe in (inhale) slowly through your mouth as deeply as you can. As you take a breath, you will see the piston rise inside the large column. While the piston rises, the indicator should move upwards. It should stay in between the 2 arrows (see Figure).  Try to get the piston as high as you can, while keeping the indicator between the arrows.   If the indicator doesn't stay between the arrows, you're breathing either too fast or too slow.  When you get it as high as you can, hold your breath for 10 seconds, or as long as possible. While you're holding your breath, the piston will slowly fall to the base of the spirometer.  Once the piston reaches the bottom of the spirometer, breathe out slowly through your mouth. Rest for a few seconds.  Repeat 10 times. Try to get the piston to the same level with each breath.  Repeat every hour while awake  You can carefully clean the outside of the mouthpiece with an alcohol wipe or soap and water.      Preoperative Brain Exercises    What are brain exercises?  A brain exercise is any activity that engages your thinking (cognitive) skills.    What types of activities are considered brain  exercises?  Jigsaw puzzles, crossword puzzles, word jumble, memory games, word search, and many more.  Many can be found free online or on your phone via a mobile kelechi.    Why should I do brain exercises before my surgery?  More recent research has shown brain exercise before surgery can lower the risk of postoperative delirium (confusion) which can be especially important for older adults.  Patients who did brain exercises for 5 to 10 hours the days before surgery, cut their risk of postoperative delirium in half up to 1 week after surgery.    Sit-to-Stand Exercise    What is the sit-to-stand exercise?  The sit-to-stand exercise strengthens the muscles of your lower body and muscles in the center of your body (core muscles for stability) helping to maintain and improve your strength and mobility.  How do I do the sit-to-stand exercise?  The goal is to do this exercise without using your arms or hands.  If this is too difficult, use your arms and hands or a chair with armrests to help slowly push yourself to the standing position and lower yourself back to the sitting position. As the movement becomes easier use your arms and hands less.    Steps to the sit-to-stand exercise  Sit up tall in a sturdy chair, knees bent, feet flat on the floor shoulder-width apart.  Shift your hips/pelvis forward in the chair to correctly position yourself for the next movement.  Lean forward at your hips.  Stand up straight putting equal weight on both feet.  Check to be sure you are properly aligned with the chair, in a slow controlled movement sit back down.  Repeat this exercise 10-15 times.  If needed you can do it fewer times until your strength improves.  Rest for 1 minute.  Do another 10-15 sit-to-stand exercises.  Try to do this in the morning and evening.        Instructions    Preoperative Fasting Guidelines    Why must I stop eating and drinking near surgery time?  With sedation, food or liquid in your stomach can enter your  lungs causing serious complications  Food can increase nausea and vomiting  When do I need to stop eating and drinking before my surgery?      Do not eat any food after midnight the night before your surgery/procedure. You may have up to 13.5 ounces of clear liquid until TWO hours before your instructed arrival time to the hospital.  This includes water, black tea/coffee, (no milk or cream) apple juice, and electrolyte drinks (Gatorade). You may chew gum until TWO hours before your surgery/procedure            Simple things you can do to help prevent blood clots     Blood clots are blockages that can form in the body's veins. When a blood clot forms in your deep veins, it may be called a deep vein thrombosis, or DVT for short. Blood clots can happen in any part of the body where blood flows, but they are most common in the arms and legs. If a piece of a blood clot breaks free and travels to the lungs, it is called a pulmonary embolus (PE). A PE can be a very serious problem.         Being in the hospital or having surgery can raise your chances of getting a blood clot because you may not be well enough to move around as much as you normally do.         Ways you can help prevent blood clots in the hospital       Wearing SCDs  SCDs stands for Sequential Compression Devices.   SCDs are special sleeves that wrap around your legs. They attach to a pump that fills them with air to gently squeeze your legs every few minutes.  This helps return the blood in your legs to your heart.   SCDs should only be taken off when walking or bathing. SCDs may not be comfortable, but they can help save your life.              Pump SCD leg sleeves  Wearing compression stockings - if your doctor orders them. These special snug-fitting stockings gently squeeze your legs to help blood flow.       Walking. Walking helps move the blood in your legs.   If your doctor says it is ok, try walking the halls at least   5 times a day. Ask us to help  you get up, so you don't fall.      Taking any blood-thinning medicines your doctor orders.              Ways you can help prevent blood clots at home         Wearing compression stockings - if your doctor orders them.   Walking - to help move the blood in your legs.    Taking any blood-thinning medicines your doctor orders.      Signs of a blood clot or PE    Tell your doctor or nurse right away if you have any of the problems listed below.         If you are at home, seek medical care right away. Call 911 for chest pain or problems breathing.            Signs of a blood clot (DVT) - such as pain, swelling, redness, or warmth in your arm or legs.  Signs of a pulmonary embolism (PE) - such as chest pain or feeling short of breath      Tobacco and Alcohol;  Do not drink alcohol or smoke within 24 hours of surgery.  It is best to quit smoking for as long as possible before any surgery or procedure.    The Week before Surgery        Seven days before Surgery  Check your [facility] medication instructions  Do the exercises provided to you by [facility]   Arrange for a responsible, adult licensed  to take you home after surgery and stay with you for 24 hours.  You will not be permitted to drive yourself home if you have received any anesthetic/sedation  Six days before surgery  Check your [facility] medication instructions  Do the exercises provided to you by [facility]   Start using Chlorhexidene (CHG) body wash if prescribed  Five days before surgery  Check your [facility] medication instructions  Do the exercises provided to you by [facility]   Continue to use CHG body wash if prescribed  Three days before surgery  Check your [facility] medication instructions  Do the exercises provided to you by [facility]   Continue to use CHG body wash if prescribed  Two days before surgery  Check your [facility] medication instructions  Do the exercises provided to you by [facility]   Continue to use CHG body wash if prescribed    The Day before Surgery       Check your [facility] medication and all  other [facility] instructions including when to stop eating and drinking  You will be called with your arrival time for surgery in the late afternoon.  If you do not receive a call please reach out to your surgeon's office.  Do not smoke or drink 24 hours before surgery  Prepare items to bring with you to the hospital  Shower with your chlorhexidine wash if prescribed  Brush your teeth and use your chlorhexidine dental rinse if prescribed    The Day of Surgery       Check your [facility] medication instructions  Ensure you follow the instructions for when to stop eating and drinking  Shower, if prescribed use CHG.  Do not apply any lotions, creams, moisturizers, perfume or deodorant  Brush your teeth and use your CHG dental rinse if prescribed  Wear loose comfortable clothing  Avoid make-up  Remove  jewelry and piercings, consider professional piercing removal with a plastic spacer if needed  Bring photo ID and Insurance card  Bring an accurate medication list that includes medication dose, frequency and allergies  Bring a copy of your advanced directives (will, health care power of )  Bring any devices and controllers as well as medical devices you have been provided with for surgery (CPAP, slings, braces, etc.)  Dentures, eyeglasses, and contacts will be removed before surgery, please bring cases for contacts or glasses

## 2024-06-12 DIAGNOSIS — K21.9 GASTROESOPHAGEAL REFLUX DISEASE WITHOUT ESOPHAGITIS: ICD-10-CM

## 2024-06-12 RX ORDER — OMEPRAZOLE 40 MG/1
40 CAPSULE, DELAYED RELEASE ORAL DAILY
Qty: 90 CAPSULE | Refills: 0 | Status: SHIPPED | OUTPATIENT
Start: 2024-06-12

## 2024-06-20 ENCOUNTER — TELEPHONE (OUTPATIENT)
Dept: OTOLARYNGOLOGY | Facility: CLINIC | Age: 54
End: 2024-06-20
Payer: COMMERCIAL

## 2024-06-20 ENCOUNTER — ANESTHESIA EVENT (OUTPATIENT)
Dept: OPERATING ROOM | Facility: HOSPITAL | Age: 54
End: 2024-06-20
Payer: COMMERCIAL

## 2024-06-20 NOTE — TELEPHONE ENCOUNTER
Spoke with patient regarding questions about time of arrival for surgery on 6/21. Told patient to expect a call from the OR scheduling dept with an arrival time. Also discussed npo after midnight. Patient states that anesthesiologist told him that he could drink water up to 4 hours prior to surgery. Reiterated recommendation to be npo after midnight.

## 2024-06-21 ENCOUNTER — HOSPITAL ENCOUNTER (OUTPATIENT)
Facility: HOSPITAL | Age: 54
Setting detail: OUTPATIENT SURGERY
Discharge: HOME | End: 2024-06-21
Attending: STUDENT IN AN ORGANIZED HEALTH CARE EDUCATION/TRAINING PROGRAM | Admitting: STUDENT IN AN ORGANIZED HEALTH CARE EDUCATION/TRAINING PROGRAM
Payer: COMMERCIAL

## 2024-06-21 ENCOUNTER — ANESTHESIA (OUTPATIENT)
Dept: OPERATING ROOM | Facility: HOSPITAL | Age: 54
End: 2024-06-21
Payer: COMMERCIAL

## 2024-06-21 VITALS
OXYGEN SATURATION: 94 % | HEIGHT: 69 IN | RESPIRATION RATE: 15 BRPM | BODY MASS INDEX: 32.26 KG/M2 | WEIGHT: 217.81 LBS | SYSTOLIC BLOOD PRESSURE: 126 MMHG | DIASTOLIC BLOOD PRESSURE: 73 MMHG | TEMPERATURE: 97.2 F | HEART RATE: 86 BPM

## 2024-06-21 DIAGNOSIS — H71.22 CHOLESTEATOMA OF LEFT MASTOID: Primary | ICD-10-CM

## 2024-06-21 PROCEDURE — 2500000001 HC RX 250 WO HCPCS SELF ADMINISTERED DRUGS (ALT 637 FOR MEDICARE OP): Performed by: STUDENT IN AN ORGANIZED HEALTH CARE EDUCATION/TRAINING PROGRAM

## 2024-06-21 PROCEDURE — 2500000004 HC RX 250 GENERAL PHARMACY W/ HCPCS (ALT 636 FOR OP/ED): Performed by: STUDENT IN AN ORGANIZED HEALTH CARE EDUCATION/TRAINING PROGRAM

## 2024-06-21 PROCEDURE — 21235 EAR CARTILAGE GRAFT: CPT | Performed by: STUDENT IN AN ORGANIZED HEALTH CARE EDUCATION/TRAINING PROGRAM

## 2024-06-21 PROCEDURE — 7100000001 HC RECOVERY ROOM TIME - INITIAL BASE CHARGE: Performed by: STUDENT IN AN ORGANIZED HEALTH CARE EDUCATION/TRAINING PROGRAM

## 2024-06-21 PROCEDURE — 2500000002 HC RX 250 W HCPCS SELF ADMINISTERED DRUGS (ALT 637 FOR MEDICARE OP, ALT 636 FOR OP/ED): Performed by: ANESTHESIOLOGY

## 2024-06-21 PROCEDURE — 3700000001 HC GENERAL ANESTHESIA TIME - INITIAL BASE CHARGE: Performed by: STUDENT IN AN ORGANIZED HEALTH CARE EDUCATION/TRAINING PROGRAM

## 2024-06-21 PROCEDURE — 2500000005 HC RX 250 GENERAL PHARMACY W/O HCPCS

## 2024-06-21 PROCEDURE — L8613 OSSICULAR IMPLANT: HCPCS | Performed by: STUDENT IN AN ORGANIZED HEALTH CARE EDUCATION/TRAINING PROGRAM

## 2024-06-21 PROCEDURE — 2500000005 HC RX 250 GENERAL PHARMACY W/O HCPCS: Performed by: STUDENT IN AN ORGANIZED HEALTH CARE EDUCATION/TRAINING PROGRAM

## 2024-06-21 PROCEDURE — 3600000003 HC OR TIME - INITIAL BASE CHARGE - PROCEDURE LEVEL THREE: Performed by: STUDENT IN AN ORGANIZED HEALTH CARE EDUCATION/TRAINING PROGRAM

## 2024-06-21 PROCEDURE — 2500000004 HC RX 250 GENERAL PHARMACY W/ HCPCS (ALT 636 FOR OP/ED)

## 2024-06-21 PROCEDURE — 2720000007 HC OR 272 NO HCPCS: Performed by: STUDENT IN AN ORGANIZED HEALTH CARE EDUCATION/TRAINING PROGRAM

## 2024-06-21 PROCEDURE — 3600000008 HC OR TIME - EACH INCREMENTAL 1 MINUTE - PROCEDURE LEVEL THREE: Performed by: STUDENT IN AN ORGANIZED HEALTH CARE EDUCATION/TRAINING PROGRAM

## 2024-06-21 PROCEDURE — 2500000004 HC RX 250 GENERAL PHARMACY W/ HCPCS (ALT 636 FOR OP/ED): Performed by: ANESTHESIOLOGY

## 2024-06-21 PROCEDURE — 2500000002 HC RX 250 W HCPCS SELF ADMINISTERED DRUGS (ALT 637 FOR MEDICARE OP, ALT 636 FOR OP/ED): Performed by: STUDENT IN AN ORGANIZED HEALTH CARE EDUCATION/TRAINING PROGRAM

## 2024-06-21 PROCEDURE — 7100000002 HC RECOVERY ROOM TIME - EACH INCREMENTAL 1 MINUTE: Performed by: STUDENT IN AN ORGANIZED HEALTH CARE EDUCATION/TRAINING PROGRAM

## 2024-06-21 PROCEDURE — 88304 TISSUE EXAM BY PATHOLOGIST: CPT | Mod: TC,SUR | Performed by: STUDENT IN AN ORGANIZED HEALTH CARE EDUCATION/TRAINING PROGRAM

## 2024-06-21 PROCEDURE — 2780000003 HC OR 278 NO HCPCS: Performed by: STUDENT IN AN ORGANIZED HEALTH CARE EDUCATION/TRAINING PROGRAM

## 2024-06-21 PROCEDURE — A4217 STERILE WATER/SALINE, 500 ML: HCPCS | Performed by: STUDENT IN AN ORGANIZED HEALTH CARE EDUCATION/TRAINING PROGRAM

## 2024-06-21 PROCEDURE — 69644 REVISE MIDDLE EAR & MASTOID: CPT | Performed by: STUDENT IN AN ORGANIZED HEALTH CARE EDUCATION/TRAINING PROGRAM

## 2024-06-21 PROCEDURE — 3700000002 HC GENERAL ANESTHESIA TIME - EACH INCREMENTAL 1 MINUTE: Performed by: STUDENT IN AN ORGANIZED HEALTH CARE EDUCATION/TRAINING PROGRAM

## 2024-06-21 DEVICE — PRECISE CENTERED TOTAL 4.50MM LENGTH TITANIUM
Type: IMPLANTABLE DEVICE | Site: EAR | Status: FUNCTIONAL
Brand: PRECISE CENTERED TOTAL

## 2024-06-21 RX ORDER — ROCURONIUM BROMIDE 10 MG/ML
INJECTION, SOLUTION INTRAVENOUS AS NEEDED
Status: DISCONTINUED | OUTPATIENT
Start: 2024-06-21 | End: 2024-06-21

## 2024-06-21 RX ORDER — PROPOFOL 10 MG/ML
INJECTION, EMULSION INTRAVENOUS CONTINUOUS PRN
Status: DISCONTINUED | OUTPATIENT
Start: 2024-06-21 | End: 2024-06-21

## 2024-06-21 RX ORDER — SODIUM CHLORIDE, SODIUM LACTATE, POTASSIUM CHLORIDE, CALCIUM CHLORIDE 600; 310; 30; 20 MG/100ML; MG/100ML; MG/100ML; MG/100ML
100 INJECTION, SOLUTION INTRAVENOUS CONTINUOUS
Status: DISCONTINUED | OUTPATIENT
Start: 2024-06-21 | End: 2024-06-22 | Stop reason: HOSPADM

## 2024-06-21 RX ORDER — LIDOCAINE HYDROCHLORIDE 10 MG/ML
0.1 INJECTION INFILTRATION; PERINEURAL ONCE
Status: DISCONTINUED | OUTPATIENT
Start: 2024-06-21 | End: 2024-06-22 | Stop reason: HOSPADM

## 2024-06-21 RX ORDER — CEFAZOLIN 1 G/1
INJECTION, POWDER, FOR SOLUTION INTRAVENOUS AS NEEDED
Status: DISCONTINUED | OUTPATIENT
Start: 2024-06-21 | End: 2024-06-21

## 2024-06-21 RX ORDER — SODIUM CHLORIDE 0.9 G/100ML
IRRIGANT IRRIGATION AS NEEDED
Status: DISCONTINUED | OUTPATIENT
Start: 2024-06-21 | End: 2024-06-21 | Stop reason: HOSPADM

## 2024-06-21 RX ORDER — ALBUTEROL SULFATE 0.83 MG/ML
2.5 SOLUTION RESPIRATORY (INHALATION) ONCE AS NEEDED
Status: DISCONTINUED | OUTPATIENT
Start: 2024-06-21 | End: 2024-06-22 | Stop reason: HOSPADM

## 2024-06-21 RX ORDER — LABETALOL HYDROCHLORIDE 5 MG/ML
5 INJECTION, SOLUTION INTRAVENOUS ONCE AS NEEDED
Status: DISCONTINUED | OUTPATIENT
Start: 2024-06-21 | End: 2024-06-22 | Stop reason: HOSPADM

## 2024-06-21 RX ORDER — MIDAZOLAM HYDROCHLORIDE 1 MG/ML
INJECTION INTRAMUSCULAR; INTRAVENOUS AS NEEDED
Status: DISCONTINUED | OUTPATIENT
Start: 2024-06-21 | End: 2024-06-21

## 2024-06-21 RX ORDER — TRAMADOL HYDROCHLORIDE 50 MG/1
50 TABLET ORAL EVERY 6 HOURS PRN
Qty: 16 TABLET | Refills: 0 | Status: SHIPPED | OUTPATIENT
Start: 2024-06-21

## 2024-06-21 RX ORDER — APREPITANT 40 MG/1
CAPSULE ORAL AS NEEDED
Status: DISCONTINUED | OUTPATIENT
Start: 2024-06-21 | End: 2024-06-21

## 2024-06-21 RX ORDER — ONDANSETRON 4 MG/1
4 TABLET, FILM COATED ORAL EVERY 8 HOURS PRN
Qty: 6 TABLET | Refills: 0 | Status: SHIPPED | OUTPATIENT
Start: 2024-06-21 | End: 2024-06-23

## 2024-06-21 RX ORDER — SODIUM CHLORIDE, SODIUM LACTATE, POTASSIUM CHLORIDE, CALCIUM CHLORIDE 600; 310; 30; 20 MG/100ML; MG/100ML; MG/100ML; MG/100ML
INJECTION, SOLUTION INTRAVENOUS CONTINUOUS PRN
Status: DISCONTINUED | OUTPATIENT
Start: 2024-06-21 | End: 2024-06-21

## 2024-06-21 RX ORDER — OXYCODONE HYDROCHLORIDE 5 MG/1
5 TABLET ORAL EVERY 4 HOURS PRN
Status: DISCONTINUED | OUTPATIENT
Start: 2024-06-21 | End: 2024-06-22 | Stop reason: HOSPADM

## 2024-06-21 RX ORDER — PHENYLEPHRINE HCL IN 0.9% NACL 0.4MG/10ML
SYRINGE (ML) INTRAVENOUS AS NEEDED
Status: DISCONTINUED | OUTPATIENT
Start: 2024-06-21 | End: 2024-06-21

## 2024-06-21 RX ORDER — HYDROMORPHONE HYDROCHLORIDE 1 MG/ML
0.5 INJECTION, SOLUTION INTRAMUSCULAR; INTRAVENOUS; SUBCUTANEOUS EVERY 5 MIN PRN
Status: DISCONTINUED | OUTPATIENT
Start: 2024-06-21 | End: 2024-06-22 | Stop reason: HOSPADM

## 2024-06-21 RX ORDER — OXYCODONE HYDROCHLORIDE 5 MG/1
10 TABLET ORAL EVERY 4 HOURS PRN
Status: DISCONTINUED | OUTPATIENT
Start: 2024-06-21 | End: 2024-06-22 | Stop reason: HOSPADM

## 2024-06-21 RX ORDER — DROPERIDOL 2.5 MG/ML
0.62 INJECTION, SOLUTION INTRAMUSCULAR; INTRAVENOUS ONCE AS NEEDED
Status: DISCONTINUED | OUTPATIENT
Start: 2024-06-21 | End: 2024-06-22 | Stop reason: HOSPADM

## 2024-06-21 RX ORDER — AMOXICILLIN 250 MG
1 CAPSULE ORAL DAILY
Qty: 10 TABLET | Refills: 0 | Status: SHIPPED | OUTPATIENT
Start: 2024-06-21 | End: 2024-07-01

## 2024-06-21 RX ORDER — ONDANSETRON HYDROCHLORIDE 2 MG/ML
4 INJECTION, SOLUTION INTRAVENOUS ONCE AS NEEDED
Status: DISCONTINUED | OUTPATIENT
Start: 2024-06-21 | End: 2024-06-22 | Stop reason: HOSPADM

## 2024-06-21 RX ORDER — APREPITANT 40 MG/1
40 CAPSULE ORAL ONCE
Status: COMPLETED | OUTPATIENT
Start: 2024-06-21 | End: 2024-06-21

## 2024-06-21 RX ORDER — SCOLOPAMINE TRANSDERMAL SYSTEM 1 MG/1
1 PATCH, EXTENDED RELEASE TRANSDERMAL
Status: DISCONTINUED | OUTPATIENT
Start: 2024-06-21 | End: 2024-06-21 | Stop reason: HOSPADM

## 2024-06-21 RX ORDER — FENTANYL CITRATE 50 UG/ML
INJECTION, SOLUTION INTRAMUSCULAR; INTRAVENOUS CONTINUOUS PRN
Status: DISCONTINUED | OUTPATIENT
Start: 2024-06-21 | End: 2024-06-21

## 2024-06-21 RX ORDER — CIPROFLOXACIN AND DEXAMETHASONE 3; 1 MG/ML; MG/ML
SUSPENSION/ DROPS AURICULAR (OTIC) AS NEEDED
Status: DISCONTINUED | OUTPATIENT
Start: 2024-06-21 | End: 2024-06-21 | Stop reason: HOSPADM

## 2024-06-21 RX ORDER — PROPOFOL 10 MG/ML
INJECTION, EMULSION INTRAVENOUS AS NEEDED
Status: DISCONTINUED | OUTPATIENT
Start: 2024-06-21 | End: 2024-06-21

## 2024-06-21 RX ORDER — ONDANSETRON HYDROCHLORIDE 2 MG/ML
INJECTION, SOLUTION INTRAVENOUS AS NEEDED
Status: DISCONTINUED | OUTPATIENT
Start: 2024-06-21 | End: 2024-06-21

## 2024-06-21 RX ORDER — LIDOCAINE HYDROCHLORIDE 20 MG/ML
INJECTION, SOLUTION INFILTRATION; PERINEURAL AS NEEDED
Status: DISCONTINUED | OUTPATIENT
Start: 2024-06-21 | End: 2024-06-21

## 2024-06-21 RX ORDER — HYDROMORPHONE HYDROCHLORIDE 1 MG/ML
0.2 INJECTION, SOLUTION INTRAMUSCULAR; INTRAVENOUS; SUBCUTANEOUS EVERY 5 MIN PRN
Status: DISCONTINUED | OUTPATIENT
Start: 2024-06-21 | End: 2024-06-22 | Stop reason: HOSPADM

## 2024-06-21 RX ORDER — LIDOCAINE HYDROCHLORIDE AND EPINEPHRINE 10; 10 MG/ML; UG/ML
INJECTION, SOLUTION INFILTRATION; PERINEURAL AS NEEDED
Status: DISCONTINUED | OUTPATIENT
Start: 2024-06-21 | End: 2024-06-21 | Stop reason: HOSPADM

## 2024-06-21 RX ORDER — PHENYLEPHRINE 10 MG/250 ML(40 MCG/ML)IN 0.9 % SOD.CHLORIDE INTRAVENOUS
CONTINUOUS PRN
Status: DISCONTINUED | OUTPATIENT
Start: 2024-06-21 | End: 2024-06-21

## 2024-06-21 RX ORDER — CIPROFLOXACIN AND DEXAMETHASONE 3; 1 MG/ML; MG/ML
3 SUSPENSION/ DROPS AURICULAR (OTIC) 2 TIMES DAILY
Qty: 7.5 ML | Refills: 0 | Status: SHIPPED | OUTPATIENT
Start: 2024-06-21 | End: 2024-06-28

## 2024-06-21 RX ORDER — KETOROLAC TROMETHAMINE 30 MG/ML
INJECTION, SOLUTION INTRAMUSCULAR; INTRAVENOUS AS NEEDED
Status: DISCONTINUED | OUTPATIENT
Start: 2024-06-21 | End: 2024-06-21

## 2024-06-21 ASSESSMENT — PAIN SCALES - GENERAL
PAINLEVEL_OUTOF10: 0 - NO PAIN
PAINLEVEL_OUTOF10: 4
PAINLEVEL_OUTOF10: 0 - NO PAIN
PAINLEVEL_OUTOF10: 0 - NO PAIN

## 2024-06-21 ASSESSMENT — PAIN - FUNCTIONAL ASSESSMENT
PAIN_FUNCTIONAL_ASSESSMENT: 0-10

## 2024-06-21 NOTE — H&P
History Of Present Illness  Mau Cisse is a 54 y.o. male with a hx of left ear drainage and hearing loss. He has had several prior surgeries on the left ear. Has had many ear infections throughout his life, mostly on the left side. On exam there was concern for left attic cholesteatoma. No significant change in history or symptoms since last seen by ENT.     Past Medical History  He has a past medical history of Abnormal ECG (09/16/2022), Arthritis, Bicuspid aortic valve ([facility]), Cholesteatoma of middle ear and mastoid, CKD (chronic kidney disease), Dilated aortic root ([facility]), Diverticulosis, GERD (gastroesophageal reflux disease), Gout, Heart murmur, History of PFTs (06/16/2023), HL (hearing loss), Hyperlipidemia, Hypertension, Lung nodules, Mild aortic valve regurgitation, Obesity, Personal history of infections of the central nervous system (02/2018), PONV (postoperative nausea and vomiting), Reactive airway disease ([facility]), Renal cancer (Multi) (2003), Renal mass, left, Shortness of breath, Sinusitis, Sleep apnea, Thoracic ascending aortic aneurysm ([facility]) (02/21/2024), Ulcerative colitis (Multi), and Vision loss.    Surgical History  He has a past surgical history that includes Nephrectomy (Right, 2005); Other surgical history; Total hip arthroplasty (Bilateral, 05/2017); Other surgical history (07/09/2020); Other surgical history (07/09/2020); Other surgical history (07/09/2020); Rotator cuff repair (Right, 09/23/2022); Vasectomy; Colonoscopy; CT chest wo IV contrast (02/20/2024); US carotid doppler left (2018); and Hernia repair.     Social History  He reports that he has quit smoking. His smoking use included cigarettes. He has a 6 pack-year smoking history. He has never used smokeless tobacco. He reports current alcohol use of about 4.0 standard drinks of alcohol per week. He reports that he does not use drugs.    Family History  Family History   Problem Relation Name Age of Onset     Hypertension Mother      Hypertension Father      Stroke Father          Allergies  Adhesive tape-silicones, Codeine, Sulfa (sulfonamide antibiotics), Adhesive, Amlodipine besylate, Lisinopril, Sumatriptan, and Valsartan    Review of Systems   All other systems reviewed and are negative.       Physical Exam  Constitutional:       Appearance: Normal appearance.   HENT:      Head: Normocephalic and atraumatic.      Right Ear: Tympanic membrane, ear canal and external ear normal.      Left Ear: Ear canal and external ear normal.      Ears:      Comments: TM with superior retraction, debris     Nose: Nose normal.      Mouth/Throat:      Mouth: Mucous membranes are moist.      Pharynx: Oropharynx is clear.   Eyes:      Extraocular Movements: Extraocular movements intact.      Pupils: Pupils are equal, round, and reactive to light.   Cardiovascular:      Rate and Rhythm: Normal rate and regular rhythm.   Pulmonary:      Effort: Pulmonary effort is normal.      Breath sounds: Normal breath sounds.   Musculoskeletal:      Cervical back: Normal range of motion and neck supple.   Neurological:      Mental Status: He is alert.            Assessment/Plan   Principal Problem:    Cholesteatoma of left mastoid    Plan to proceed with surgery today as scheduled       Bert Amaya MD

## 2024-06-21 NOTE — ANESTHESIA PREPROCEDURE EVALUATION
"Patient: Jamil Cisse \"Mau\"    Procedure Information       Date/Time: 06/21/24 1330    Procedures:       Ossiculoplasty (Left)      Excision Cartilage Graft Donor Site Ear (Left)    Location: [facility] OR 04 / [facility] OR    Surgeons: Jamil Brizuela MD     ALLERGIES:  Allergies   Allergen Reactions    Adhesive Tape-Silicones Other and Unknown     skin pulled off    Codeine Unknown    Sulfa (Sulfonamide Antibiotics) Other    Adhesive Other and Unknown     Pulls skin off    Amlodipine Besylate Rash     Flush    Lisinopril Other     ineffective    Sumatriptan Other     ineffective    Valsartan Other     ineffective        MEDICAL HISTORY:  Past Medical History:   Diagnosis Date    Abnormal ECG 09/16/2022    Normal sinus rhythm Nonspecific T wave abnormality    Arthritis     Bicuspid aortic valve (HHS-HCC)     Cholesteatoma of middle ear and mastoid     CKD (chronic kidney disease)     stage 3, seen by Dr. Kemp (Nephology)    Dilated aortic root (CMS-HCC)     Minimally dilated aortic root 39 mm.    Diverticulosis     GERD (gastroesophageal reflux disease)     Gout     Heart murmur     Echo scheduled for 02/29/24    History of PFTs 06/16/2023    Dyspnea; Mild restriction on PFTs    HL (hearing loss)     Hyperlipidemia     Hypertension     Lung nodules     Nonspecific 1.9 cm right upper lobe part solid nodule with mediastinal lymphadenopathy    Mild aortic valve regurgitation     Obesity     Personal history of infections of the central nervous system 02/2018    History of Herpes Encephalitis    PONV (postoperative nausea and vomiting)     Reactive airway disease ([facility]-HCC)     Renal cancer (Multi) 2003    s/p right radical nephrectomy in 2003.    Renal mass, left     Inadequately characterized 2.6 cm left renal mass. Consider further evaluation with renal US    Shortness of breath     seen by JACK Baptiste-CNP (pulmonology)    Sinusitis     Sleep apnea     Uses CPAP    Thoracic ascending aortic " aneurysm (CMS-HCC) 02/21/2024    4.3cm per CT scan on 02/21/24    Ulcerative colitis (Multi)     Treated with Cimzia    Vision loss     Wears contacts        Relevant Problems   Anesthesia (within normal limits)      Cardiac   (+) Bicuspid aortic valve (HHS-HCC)   (+) HTN (hypertension)   (+) Heart murmur   (+) Hyperlipemia, mixed      Pulmonary   (+) Obstructive sleep apnea      Neuro   (+) Cervical radiculitis      GI   (+) Crohn's disease (Multi)   (+) GERD (gastroesophageal reflux disease)   (+) Ulcerative colitis (Multi)      Liver   (+) Elevated LFTs   (+) Fatty liver      Endocrine   (+) Class 1 obesity with serious comorbidity and body mass index (BMI) of 34.0 to 34.9 in adult      Hematology   (+) Erythrocytosis      Musculoskeletal   (+) Degenerative arthritis of hip   (+) Displacement of lumbar intervertebral disc without myelopathy   (+) Osteoarthritis of hip   (+) Osteoarthritis of spine with radiculopathy, cervical region   (+) Primary osteoarthritis of right hip      HEENT   (+) Maxillary sinusitis   (+) Mixed hearing loss, bilateral   (+) Seasonal allergies        SURGICAL HISTORY:  Past Surgical History:   Procedure Laterality Date    COLONOSCOPY      CT CHEST WO IV CONTRAST  02/20/2024    Nonspecific 1.9 cm right upper lobe part solid nodule with mediastinal lymphadenopathy.Ascending thoracic aortic aneurysm, 4.3 cm.    HERNIA REPAIR      NEPHRECTOMY Right 2005    Right total nephrectomy    OTHER SURGICAL HISTORY      Left Shoulder surgery    OTHER SURGICAL HISTORY  07/09/2020    Ear surgery x3    OTHER SURGICAL HISTORY  07/09/2020    Finger surgical procedure    OTHER SURGICAL HISTORY  07/09/2020    Throat surgery    ROTATOR CUFF REPAIR Right 09/23/2022    Right rotator cuff repair with subacromial decompression    TOTAL HIP ARTHROPLASTY Bilateral 05/2017    Bilateral Hip replacement    US CAROTID DOPPLER LEFT  2018    NO HEMODYNAMICALLY SIGNIFICANT VASCULAR STENOSIS IN THE CAROTID AND VERTEBRAL  "ARTERIES IN THE NECK    VASECTOMY          MEDS:  Current Outpatient Medications   Medication Instructions    albuterol (Ventolin HFA) 90 mcg/actuation inhaler 2 puffs, inhalation, Every 6 hours PRN    aspirin-acetaminophen-caffeine (Excedrin Extra Strength) 250-250-65 mg tablet 1 tablet, oral, Every 6 hours PRN    aspirin 81 mg, oral, Daily    atorvastatin (LIPITOR) 20 mg, oral, Daily    cetirizine (ZyrTEC) 10 mg tablet oral, Daily RT    Cimzia Powder for Reconst 200 mg, subcutaneous, Every 14 days    clindamycin (Cleocin) 300 mg capsule TAKE 2 CAPSULES BY MOUTH ONE HOUR BEFORE APPOINTMENT    colchicine 0.6 mg, oral, Daily    febuxostat (ULORIC) 40 mg, oral, Daily    fluticasone (Flonase) 50 mcg/actuation nasal spray 2 sprays, Each Nostril, Daily    Maxalt-MLT 10 mg, oral, Once as needed, May repeat in 2 hours if unresolved. Do not exceed 30 mg in 24 hours.    montelukast (SINGULAIR) 10 mg, oral, Nightly    omeprazole (PRILOSEC) 40 mg, oral, Daily    telmisartan-hydrochlorothiazid (MIcarDIS HCT) 80-25 mg tablet 1 tablet, oral, Daily    Xiidra 5 % dropperette INSTILL 1 DROP IN BOTH EYES TWICE A DAY        VITALS:      6/5/2024    10:46 AM 5/14/2024     3:29 PM 4/24/2024     3:20 PM   Vitals   Systolic 126 130 149   Diastolic 86 82 91   Heart Rate 100 87 103   Temp 37.1 °C (98.7 °F)  36.8 °C (98.2 °F)   Height (in) 1.753 m (5' 9\") 1.753 m (5' 9\")    Weight (lb) 226.9 232 234   BMI 33.51 kg/m2 34.26 kg/m2 34.56 kg/m2   BSA (m2) 2.24 m2 2.26 m2 2.27 m2   Visit Report  Report Report       LABS:   BMP   Lab Results   Component Value Date    GLUCOSE 90 06/05/2024    CALCIUM 10.6 06/05/2024     06/05/2024    K 4.6 06/05/2024    CO2 26 06/05/2024     06/05/2024    BUN 24 (H) 06/05/2024    CREATININE 1.59 (H) 06/05/2024   , LFT   Lab Results   Component Value Date    ALT 48 01/04/2024    AST 31 01/04/2024    ALKPHOS 60 01/04/2024    BILITOT 0.4 01/04/2024   , CBC  Lab Results   Component Value Date    WBC 4.8 " "06/05/2024    HGB 13.7 06/05/2024    HCT 44.4 06/05/2024    MCV 76 (L) 06/05/2024     06/05/2024          , Alexandro   Lab Results   Component Value Date/Time    PROTIME 10.9 09/16/2022 0926    INR 0.9 09/16/2022 0926    APTT 32 09/16/2022 0926      , A1C No results found for: \"HGBA1C\"    IMAGES:  EKG          Encounter Date: 03/08/24   ECG 12 Lead   Result Value    Ventricular Rate 80    Atrial Rate 80    NY Interval 154    QRS Duration 90    QT Interval 360    QTC Calculation(Bazett) 415    P Axis 26    R Axis -10    T Axis 34    QRS Count 13    Q Onset 223    P Onset 146    P Offset 199    T Offset 403    QTC Fredericia 396    Narrative    Normal sinus rhythm  Normal ECG  When compared with ECG of 16-SEP-2022 08:37,  No significant change was found  Confirmed by Meng Saleem ([id number]) on 3/10/2024 6:33:45 PM      , ECHO         Transthoracic Echo (TTE) Complete 02/29/2024    Narrative  [facility] [address], [place] [address]  Tel [phone] Fax [phone]    TRANSTHORACIC ECHOCARDIOGRAM REPORT      Patient Name:      MARSHALL Giron Physician:    [id number] Andre Bowman MD, [facility]  Study Date:        2/29/2024             Ordering Provider:    [id number] JOSUE RUBIO  MRN/PID:           [id number]              Fellow:  Accession#:        [id number]          Nurse:  Date of Birth/Age: 1970 / 54 years   Sonographer:          Linh Nguyen  RDCS, RT(R),  RDMS, RVT  Gender:            M                     Additional Staff:  Height:            175.26 cm             Admit Date:  Weight:            101.15 kg             Admission Status:  BSA / BMI:         2.16 m2 / 32.93 kg/m2 Department Location:  [facility]  Blood Pressure: 140 /88 mmHg    Study Type:    TRANSTHORACIC ECHO (TTE) COMPLETE  Diagnosis/ICD: Shortness of breath-R06.02    Patient History:  Pertinent History: HTN, Hyperlipidemia, Murmur, TIA and Past " history of renal  cell carcinoma.    Study Detail: The following Echo studies were performed: 2D, M-Mode, Doppler and  color flow.      PHYSICIAN INTERPRETATION:  Left Ventricle: Left ventricular systolic function is normal, with an estimated ejection fraction of 55-60%. There are no regional wall motion abnormalities. The left ventricular cavity size is normal. There is moderate concentric left ventricular hypertrophy. Spectral Doppler shows a normal pattern of left ventricular diastolic filling.  Left Atrium: The left atrium is normal in size.  Right Ventricle: The right ventricle is normal in size. There is normal right ventricular global systolic function. Normal RV size and function  RVSP could not be calculated as no significant TR Doppler envelope.  Right Atrium: The right atrium is normal in size.  Aortic Valve: The aortic valve appears abnormal. There is mild aortic valve regurgitation. The peak instantaneous gradient of the aortic valve is 26.2 mmHg. The mean gradient of the aortic valve is 18.0 mmHg. Probable bicuspid aortic valve which is densely calcified. The right coronary cusp leaflet is essentially immobile. There is mild aortic stenosis V-max 2.6 m/s mean gradient 18 mmHg valve area ~1.3 cm sq. 1+ eccentric AI.  Mitral Valve: The mitral valve is normal in structure. There is no evidence of mitral valve regurgitation. Normal mitral valve.  Tricuspid Valve: The tricuspid valve is structurally normal. No evidence of tricuspid regurgitation.  Pulmonic Valve: The pulmonic valve is structurally normal. There is no indication of pulmonic valve regurgitation.  Pericardium: There is no pericardial effusion noted.  Aorta: The aortic root is abnormal. There is mild dilatation of the ascending aorta. There is mild dilatation of the aortic root. Minimally dilated aortic root 39 mm. Ascending aorta mildly dilated 42 mm.      CONCLUSIONS:  1. Left ventricular systolic function is normal with a 55-60% estimated  ejection fraction.  2. There is moderate concentric left ventricular hypertrophy.  3. Normal RV size and function  RVSP could not be calculated as no significant TR Doppler envelope.  4. Normal mitral valve.  5. Aortic valve appears abnormal.  6. Probable bicuspid aortic valve which is densely calcified. The right coronary cusp leaflet is essentially immobile. There is mild aortic stenosis V-max 2.6 m/s mean gradient 18 mmHg valve area ~1.3 cm sq. 1+ eccentric AI.  7. Mild aortic valve regurgitation.  8. Minimally dilated aortic root 39 mm. Ascending aorta mildly dilated 42 mm.    QUANTITATIVE DATA SUMMARY:  2D MEASUREMENTS:  Normal Ranges:  Ao Root d:     3.90 cm    (2.0-3.7cm)  LAs:           4.00 cm    (2.7-4.0cm)  RVIDd:         2.74 cm    (0.9-3.6cm)  IVSd:          1.55 cm    (0.6-1.1cm)  LVPWd:         1.47 cm    (0.6-1.1cm)  LVIDd:         5.00 cm    (3.9-5.9cm)  LVIDs:         3.50 cm  LV Mass Index: 150.6 g/m2  LV % FS        30.0 %    LA VOLUME:  Normal Ranges:  LA Volume Index: 13.0 ml/m2    AORTA MEASUREMENTS:  Normal Ranges:  Asc Ao, d: 4.20 cm (2.1-3.4cm)    LV SYSTOLIC FUNCTION BY 2D PLANIMETRY (MOD):  Normal Ranges:  EF-A4C View: 51.3 % (>=55%)  EF-A2C View: 58.3 %  EF-Biplane:  53.2 %    LV DIASTOLIC FUNCTION:  Normal Ranges:  MV Peak E:        0.80 m/s    (0.7-1.2 m/s)  MV Peak A:        0.85 m/s    (0.42-0.7 m/s)  E/A Ratio:        0.94        (1.0-2.2)  MV lateral e'     0.10 m/s  MV medial e'      0.08 m/s  E/e' Ratio:       8.40        (<8.0)  PulmV Sys Jose Francisco:    54.10 cm/s  PulmV Ferrer Jose Francisco:   45.50 cm/s  PulmV S/D Jose Francisco:    1.20  PulmV A Revs Jose Francisco: 26.00 cm/s  PulmV A Revs Dur: 102.00 msec    MITRAL VALVE:  Normal Ranges:  MV Vmax:    0.92 m/s (<=1.3m/s)  MV peak PG: 3.4 mmHg (<5mmHg)  MV mean P.0 mmHg (<48mmHg)  MV DT:      180 msec (150-240msec)    AORTIC VALVE:  Normal Ranges:  AoV Vmax:                2.56 m/s  (<=1.7m/s)  AoV Peak P.2 mmHg (<20mmHg)  AoV Mean PG:            "  18.0 mmHg (1.7-11.5mmHg)  LVOT Max Jose Francisco:            1.03 m/s  (<=1.1m/s)  AoV VTI:                 50.60 cm  (18-25cm)  LVOT VTI:                22.90 cm  LVOT Diameter:           2.00 cm   (1.8-2.4cm)  AoV Area, VTI:           1.42 cm2  (2.5-5.5cm2)  AoV Area,Vmax:           1.26 cm2  (2.5-4.5cm2)  AoV Dimensionless Index: 0.45    AORTIC INSUFFICIENCY:  AI Vmax:       4.06 m/s  AI Half-time:  304 msec  AI Decel Rate: 411.00 cm/s2    PULMONIC VALVE:  Normal Ranges:  PV Max Jose Francisco: 0.8 m/s  (0.6-0.9m/s)  PV Max P.9 mmHg    Pulmonary Veins:  PulmV A Revs Dur: 102.00 msec  PulmV A Revs Jose Francisco: 26.00 cm/s  PulmV Ferrer Jose Francisco:   45.50 cm/s  PulmV S/D Jose Francisco:    1.20  PulmV Sys Jose Francisco:    54.10 cm/s      [id number] Andre Bowman MD, [facility]  Electronically signed on 2024 at 6:12:21 PM        ** Final (Updated) **    , [facility]ARDIAC CATH      [facility], [facility]     No results found for this or any previous visit from the past 730 days.    , CT Head/Neck    [facility], CT Chest        CT angio chest w and wo IV contrast 2024    Narrative  * * *Final Report* * *    DATE OF EXAM: 2024  8:36AM    JQC   0125  -  CTA CHEST (GATED) W IVCON  / ACCESSION #  [id number]    PROCEDURE REASON: multiple diagnoses    * * * * Physician Interpretation * * * *    CTA Aorta chest  Direct Image Comparison: Outside noncontrast CT chest 2024    HISTORY: 54 year old Male with h/o suspected aortic disease  Evaluation for diagnostic clarification and further treatment options.  There is request to define thoracic and aortic anatomy    TECHNIQUE: SCANNER: Siemens Somatom Force Dual source 2x192-slice scanner  PROTOCOL: Prospectively triggered helical high-pitch acquisitions  (\"triggered Flash-mode\") was performed  following the intravenous  administration of contrast material.  Scan Range: thoracic inlet to the diaphragm  CT Dose-Length Product (DLP): 226 mGy*cm  CT Dose Reduction Employed: Automated exposure control(AEC) and iterative  recon  CONTRAST: " IV administration of 80 ml Omnipaque 350  Scan acquisition: uncomplicated  Macro Version: [id number]:CCTW_[id number]  For optimization of anatomic evaluation, advanced 3-D off-line  postprocessing was performed on a dedicated workstation by the  interpreting physician.  STUDY LIMITATIONS: None..    RESULT:  LINES, TUBES and DEVICES: None  CHEST: Chest wall anatomy: unremarkable. Small partially calcified nodule  in the right thyroid lobe.  LUNGS: Subpleural reticular opacities with superimposed groundglass  predominantly involving the left lower lobe.  Clinical correlation is  recommended.  Previously mentioned large part solid right upper lobe  nodule is no longer seen.  MEDIASTINUM: mediastinal and hilar lymphadenopathy. Clinical correlation  is recommended.  PERICARDIUM: unremarkable  CENTRAL PULMONARY ARTERY: normal dimensions. Assessment is limited due to  limited contrast enhancement.    CARDIAC CHAMBERS:  LEFT VENTRICLE: concentric hypertrophy.  RIGHT VENTRICLE: normal size  Left atrium: normal size.  IVETH: normal  Right atrium: normal size  CENTRAL VENOUS and PULMONARY VENOUS RETURN: normal.  Coronary Sinus: normal size    MITRAL VALVE: assessment is limited in the current study  - no leaflet calcification. No annular calcification  TRICUSPID and PULMONIC VALVE: appear unremarkable.    CORONARY ANATOMY: normal origin of the coronary arteries.  No definitive evidence of calcified atherosclerotic changes of the  coronary arteries.    AORTIC VALVE: appears bicuspid, with calcified raphe at the RCC/LCC.  Mild-moderate leaflet calcification.  Incomplete diastolic coaptation,  suggesting aortic insufficiency.  AORTA:  Aortic Size: Dilation aortic root and ascending aorta.  STJ: partially effaced. No calcification.  Wall Changes: no evidence of wall changes.  Arch Branch Vessels: Unremarkable proximal segments of the arch branch  vessels. Normal origin of the arch branch vessels. Mild partially  calcified wall changes.  AORTIC  DIMENSIONS:  AORTIC ROOT: 4.2 cm measured sinus-to-sinus  mid ASCENDING THORACIC AORTA: 4.4 cm  mid AORTIC ARCH: 3.0 cm  mid DESCENDING THORACIC AORTA: 2.5 cm    limited upper ABDOMEN: unremarkable  BONES and SOFT TISSUES: degenerative changes of the thoracic spine.   (topogram) images: No additional findings.    Impression  IMPRESSION:    1.  Dilation of the aortic root (4.2 cm), and ascending thoracic aorta  (4.4 cm).  The remainder of the thoracic aorta is normal in course,  contour, and caliber, without acute pathology.  Likely stable dimensions  as compared to outside CT chest without contrast 02/20/2024.    2.  Likely bicuspid aortic valve with calcified raphe at the RCC/LCC.  Small diastolic coaptation gap suggestive of aortic regurgitation.    3. Stable to slightly improved subpleural reticular opacities with   superimposed groundglass predominantly involving the left lower lobe.  Clinical correlation is recommended.  Previously mentioned large part  solid right upper lobe nodule is no longer seen.    3. There are prominent mediastinal and hilar lymph nodes, clinical  correlation is recommended.    4. Small thyroid nodule Incidental Finding:  No follow-up imaging  recommended for this small < 1.0cm incidentally detected thyroid  nodule(s) in a patient with no known thyroid disease.  American [employer] Association 2009            : PSCB  Transcribe Date/Time: Jun 17 2024  8:43A    Dictated by : ROBERTO BROWN MD    This examination was interpreted and the report reviewed and  electronically signed by:  KOURTNEY NAIK MD on Jun 17 2024  4:31PM  EST   , CT Abdomin     No results found for this or any previous visit from the past 730 days.    SOCIAL:  Social History     Tobacco Use   Smoking Status Former    Current packs/day: 1.00    Average packs/day: 1 pack/day for 6.0 years (6.0 ttl pk-yrs)    Types: Cigarettes   Smokeless Tobacco Never      Social History     Substance and Sexual Activity    Alcohol Use Yes    Alcohol/week: 4.0 standard drinks of alcohol    Types: 4 Cans of beer per week    Comment: SOCIAL      Social History     Substance and Sexual Activity   Drug Use Never        NPO STATUS:  No data recorded    Clinical Areas Reviewed:                 PHYSICAL EXAM    Anesthesia Plan    History of general anesthesia?: yes  History of complications of general anesthesia?: no    ASA 3     general     intravenous induction   Anesthetic plan and risks discussed with patient.

## 2024-06-21 NOTE — ANESTHESIA PROCEDURE NOTES
Peripheral IV  Date/Time: 6/21/2024 3:42 PM      Placement  Needle size: 20 G  Laterality: right  Location: hand  Local anesthetic: none  Site prep: alcohol  Technique: anatomical landmarks  Attempts: 1

## 2024-06-21 NOTE — ANESTHESIA PROCEDURE NOTES
Airway  Date/Time: 6/21/2024 3:35 PM  Urgency: elective    Airway not difficult    Staffing  Performed: resident   Authorized by: Andre Johnson MD    Performed by: Jose Lawrence MD  Patient location during procedure: OR    Indications and Patient Condition  Indications for airway management: anesthesia  Spontaneous Ventilation: absent  Sedation level: deep  Preoxygenated: yes  Patient position: sniffing  Mask difficulty assessment: 2 - vent by mask + OA or adjuvant +/- NMBA    Final Airway Details  Final airway type: endotracheal airway      Successful airway: ETT  Cuffed: yes   Successful intubation technique: direct laryngoscopy  Facilitating devices/methods: intubating stylet  Endotracheal tube insertion site: oral  Blade: Leni  Blade size: #4  ETT size (mm): 7.5  Cormack-Lehane Classification: grade IIa - partial view of glottis  Placement verified by: capnometry   Measured from: lips  Number of attempts at approach: 1  Number of other approaches attempted: 0

## 2024-06-22 NOTE — DISCHARGE INSTRUCTIONS
Most ear surgeries should have a 2-4 week postoperative appointment. Please be sure to call the doctor's office and make a follow-up appointment, if you don't already have it.  Dressing or Band-Aid can be removed the day after surgery.  Once removed, replace the cotton ball in the ear as needed.  Once the dressing is off, and if you have an incision behind your ear with stitches, clean the incision twice daily with soap and water and apply Vaseline or antibiotic ointment after cleaning. If you have paper strips or surgical glue over the incision, Do not apply anything behind the ear.  Start ciprodex ear drops 1 week before your scheduled postoperative visit with ENT provider.  Bloody drainage from the ear is common.  Call the office if discharge from the ear last longer than 21 days or develops an odor or color.  Water should be kept out of the ear until it is healed. You may shower the day after surgery, if you keep your head dry.  The hair may be shampooed 2 days following surgery, providing water is not allowed into the ear canal.  A cotton ball covered with Vaseline should be used in the ear whenever you are around water.    Bloody discharge from incision area may occur during the first 10 days following surgery.  If this persists or increases, please call the office.  A full sensation with popping sounds may be noticed during the healing process.  DO NOT BLOW YOUR NOSE FOR THREE WEEKS FOLLOWING SURGERY. If you sneeze, do so with your mouth open for three weeks following surgery. Do not use a straw to drink beverages for 3 weeks following surgery.  Do not use Q-Tips or put anything in the canal, until approved by your doctor.  Do not be concerned regarding your hearing for a period of six to eight weeks following surgery.  Your hearing will be evaluated at this time; until then, your hearing may sound muffled and your voice may echo in your ear during speech.  Minor swelling of the face on the same side  of the surgery is not uncommon.  Small bruising near the eye or mouth is not uncommon from the facial nerve monitor.  Dizziness, ringing in the ear, and taste disturbance after surgery are common. Call if severe.   You might notice pain when chewing, please use soft diet for 2 weeks if you experience this.  No lifting (more than 10 lbs) or straining until follow up.  You will be discharged on pain medications and usually antibiotics.  You may resume your routine medications as directed, unless you have been instructed otherwise by the prescribing healthcare provider.  Should you experience any difficulty upon returning home, or if you simply have questions, please contact us.  As your surgeons, we are most familiar with your operation and postoperative procedures.  We are accessible by telephone 24 hours a day, 7 days a week.  Once we have assessed your situation, we will be prepared to make specific suggestions for your care.

## 2024-06-22 NOTE — OP NOTE
"Tympanoplasty; Mastoidectomy with Ossiculoplasty; Cartilage Graft (L), Excision Cartilage Graft Donor Site Ear (L) Operative Note     Date: 2024  OR Location: [facility] OR    Name: Jamil Cisse \"Mau\", : 1970, Age: 54 y.o., MRN: [id number], Sex: male    Diagnosis  Pre-op Diagnosis     * Cholesteatoma of left mastoid [H71.22] Post-op Diagnosis     * Cholesteatoma of left mastoid [H71.22]     Procedures  Left canal wall up tympanoplasty and mastoidectomy with ossicular chain reconstruction  Left tragal cartilage graft harvest    Surgeons      * Jamil Brizuela - Primary    Resident/Fellow/Other Assistant:  Surgeons and Role:     * Anai Fox MD - Assisting     * Bert Amaya MD - Resident - Assisting    Procedure Summary  Anesthesia: General  ASA: ASA status not filed in the log.  Anesthesia Staff: Anesthesiologist: Antonio Tillman MD; Andre Johnson MD  C-AA: LAQUITA Torres  Anesthesia Resident: Allan Bolivar MD; Jose Lawrence MD  Estimated Blood Loss: 20 mL  Intra-op Medications:   Administrations occurring from 1330 to 1610 on 24:   Medication Name Total Dose   sodium chloride 0.9 % irrigation solution 1,000 mL   lidocaine-epinephrine (Xylocaine W/EPI) 1 %-1:100,000 injection 10 mL   scopolamine (Transderm-Scop) patch 1 patch 1 patch              Anesthesia Record               Intraprocedure I/O Totals          Intake    Remifentanil Drip 0.00 mL    The total shown is the total volume documented since Anesthesia Start was filed.    Propofol Drip 0.00 mL    The total shown is the total volume documented since Anesthesia Start was filed.    Phenylephrine Drip 0.00 mL    The total shown is the total volume documented since Anesthesia Start was filed.    LR infusion 800.00 mL    Total Intake 800 mL       Output    Urine 170 mL    Est. Blood Loss 15 mL    Total Output 185 mL       Net    Net Volume 615 mL          Specimen:   ID Type Source Tests Collected by Time   1 : " LEFT MIDDLE EAR AND MASTOID CONTENTS Tissue EAR, MIDDLE EAR CONTENTS LEFT SURGICAL PATHOLOGY EXAM Jamil Brizuela MD 6/21/2024 4153        Staff:   Scrub Person: Cee Rabagoub Person: Lindsey  Circulator: Nataliia  Circulator: Karrie Carranza Circulator: Radu Carranza Circulator: Sophie  Relief Scrub: Sophie         Drains and/or Catheters:   [REMOVED] Urethral Catheter Non-latex 16 Fr. (Removed)       Tourniquet Times:         Implants:  Implants       Type Name Action Serial No.      Cochlear Implant PROSTHESIS, EAR, OSSICULAR TOTAL, 0.2MM SD X 4.5MM L, PRECISE CENTERED, TI - [id number] Implanted               Findings:   Scutum: Eroded  Facial recess: Involved with cholesteatoma  Middle ear/mastoid mucosa: Minimal inflammation  Facial nerve: Dehiscence in the tympanic segment, intact  Chorda tympani nerve: Involved with cholesteatoma and scar, removed  Malleus: Involved with cholesteatoma and eroded, removed  Incus: Absent  Stapes: Absent  Eustachian tube orifice: Patent  Ossicular chain reconstruction: 4.5 mm [employer] medical TORP  Silastic: None  Cholesteatoma extent: Posterior mesotympanum, posterior epitympanum, mastoid antrum, solid angle, facial recess.  Graft material: Tragal perichondrium and pericranium, tragal cartilage  Graft position: Tragal perichondrium and paracranium medial to remnant tympanic membrane.  Tragal cartilage used to reconstruct scutum as well as tympanic membrane and a palisading fashion, medial to perichondrium and paracranial graft.  Perforation size and location: None at the beginning of the case, 50% after removal of the cholesteatoma  Tympanosclerosis: Attic  Myringitis: None    Indications: Mau Cisse is an 54 y.o. male who has a history of multiple surgeries on the left ear who presented with chronic left ear drainage.  Exam was concerning for significant mesotympanic retraction and attic retraction with debris, concerning for cholesteatoma.  CT scan was further concerning for  cholesteatoma, though CT scan did not demonstrate labyrinthine fistula or other serious sequela I of cholesteatoma.  Tympanoplasty and mastoidectomy with removal of cholesteatoma were recommended for treatment of the cholesteatoma.  The risk, benefits of the procedure were discussed in detail with the patient including, not limited to, bleeding, pain, infection, scarring, recurrence of cholesteatoma, hearing loss, dizziness, tympanic membrane perforation, facial weakness, change in taste, cerebrospinal fluid leak, need for further procedures.  The patient elected to proceed.    Procedure Details:   The patient was identified in the preoperative holding area and transported to the operating room.  Once in the operating room, a preinduction huddle was performed with all members of the operating room staff.  General anesthesia was then induced.  Facial nerve monitors were placed in the orbicularis oris and orbicularis oculi muscles.  The patient was turned 180 degrees.  The tragus, ear canal, and postauricular region were all anesthetized with 1% lidocaine with 1-100,000 epinephrine.  The patient was then prepped and draped in the usual, standard, sterile fashion.  A timeout was then performed.  The left ear canal was visualized under binocular microscopy, which revealed the presence of deep attic retraction with debris, concerning for attic cholesteatoma as well as deep mesotympanic retraction.  Attention was then turned to the postauricular region.  A postauricular incision was then performed and carried down sharply to the level of the temporalis muscle.  A 7 shaped periosteal flap was then incised with electrocautery.  All bleeding was controlled with electrocautery.  A palva flap was then elevated to the ear canal skin.  The ear canal skin was then elevated to the level of the tympanic membrane under binocular microscopy.  Vascular strip incisions were then made and the vascular strip was retracted.  At this  point, tympanomeatal flap was elevated and the middle ear was entered, which revealed a mucoid effusion.  Microscissors were used to separate the tympanomeatal meatal flap from the significant mesotympanic retraction pocket.  Microscissors were also used to separate the superior tympanomeatal flap from the cholesteatoma.  The cholesteatoma was then partially dissected out of the mesotympanum.  In order to improve visualization, a right atticotomy was performed using a curette.  The cholesteatoma was carefully dissected out of the mesotympanum.  During dissection, the chorda tympani nerve was identified and was densely adherent to scar tissue as well as being completely encased in skin.  For this reason, the chorda tympani nerve was sectioned both proximally and distally as it was heavily involved with the cholesteatoma sac.  There was a small area of either remnant cartilage in the scutum or tympanosclerosis.  This was carefully removed.  Cholesteatoma was then dissected further out of the posterior mesotympanum.  The oval window was identified.  Of note, there was no stapes superstructure or incus present.  The footplate appeared to be intact and mobile.  Cholesteatoma was t dissected off of the tympanic facial nerve, which was dehiscent.  He malleus was significantly involved with the cholesteatoma and the decision was made to remove the cholesteatoma.  The malleus was then  from the remnant tympanomeatal flaps.  The malleus was then removed.  The cholesteatoma was then dissected completely out of the mesotympanum.  The cholesteatoma was extending posteriorly and superiorly into the posterior epitympanum and antrum.  At this point, attention was turned to towards the mastoidectomy.  Using a combination of cutting and carroll burs, the ear canal, tegmen, and sigmoid sinus were all skeletonized and followed from lateral to medial until the mastoid antrum was entered.  The zygomatic root was then fully  opened.  Cholesteatoma was identified filling the antrum and extending into the solid angle.  The cholesteatoma was carefully dissected off of the lateral canal.  The air cells and the solid angle were partially opened in order to remove all of the skin.  The zygomatic root was also fully opened her to allow for further dissection of the cholesteatoma.  The cholesteatoma was then dissected out of the posterior epitympanum completely.  The facial recess was then opened through the incus buttress.  A small amount of cholesteatoma was then dissected out of the facial recess.  At this point, the cholesteatoma was removed and sent as specimen.  Inspection of the mastoid cavity as well as middle ear did not reveal any residual cholesteatoma.  Copious irrigation was used.  Umbilical tape was used to bluntly dissect along the undersurface of the scutum, and no skin was visualized.    At this point, an incision was made on the posterior tragus.  This was then carried to the level of the tragal perichondrium.  An posterior skin flap was then elevated.  An incision was then made in the cartilage and a anterior pocket was elevated around the tragal cartilage.  The tragal cartilage was then harvested.  Of note, the cartilage was fragile and the perichondrium was of poor quality.  The perichondrium was then stripped of the cartilage and saved for later use.  The cartilage was fashioned into a superior half shield and several palisading pieces.  The tragal cartilage donor site was closed with 5-0 fast gut suture.  Because the perichondrium of the tragus was small, a pericranial flap was then harvested from the postauricular incision.  At this point, pieces of perichondrium and.  Pericranium were placed medial to the remnant tympanum meatal flap.  The eustachian tube and middle ear was packed with Ciprodex soaked Gelfoam.  Cartilage was then placed anteriorly inferiorly medial to the perichondrium and paracranial grafts.  The  distance from the oval window to the tympanic membrane was sized at 4.5 mm.  At this point, a 4.5 centered TORP was placed on the oval window.  Gelfoam was then packed around the prosthesis to ensure that it stayed in position.  A superior half shield was then placed over the prosthesis with complete coverage of the prosthesis.  The attic was then packed with Ciprodex soaked Gelfoam.  Several pieces of palisading cartilage were then used to reconstruct the scutum.  At this point, tympanomeatal flap was replaced with good placement of the grafts and complete coverage of the defect.  The medial ear canal was then packed with Gelfoam.  The vascular strip was then replaced.  The palva flap was then closed with 3-0 Vicryl.  Looking through the meatus, under binocular microscopy, the vascular strip was unfurled and the lateral ear canal was packed with Ciprodex soaked Gelfoam.  The subcutaneous tissue was closed with 4-0 Monocryl.  The skin was closed with Dermabond.  A Eugenie dressing was then applied.  Patient was then turned over to anesthesia, where he awoke without complication.    Complications:  None; patient tolerated the procedure well.    Disposition: PACU - hemodynamically stable.  Condition: stable       Attending Attestation: I was present and scrubbed for the key portions of the procedure.    Jamil Brizuela  Phone Number: [phone]

## 2024-06-22 NOTE — ANESTHESIA POSTPROCEDURE EVALUATION
"Patient: Jamil Cisse \"Mau\"    Procedure Summary       Date: 06/21/24 Room / Location: [facility] OR 04 / [facility] OR    Anesthesia Start: 1520 Anesthesia Stop: 2054    Procedures:       Tympanoplasty; Mastoidectomy with Ossiculoplasty; Cartilage Graft (Left: Ear)      Excision Cartilage Graft Donor Site Ear (Left: Ear) Diagnosis:       Cholesteatoma of left mastoid      (Cholesteatoma of left mastoid [H71.22])    Surgeons: Jamil Brizuela MD Responsible Provider: Antonio Tillman MD    Anesthesia Type: general ASA Status: 3            Anesthesia Type: general    Vitals Value Taken Time   /81 06/21/24 2057   Temp 36.2 06/21/24 2057   Pulse 97 06/21/24 2052   Resp 27 06/21/24 2052   SpO2 96 % 06/21/24 2052   Vitals shown include unfiled device data.    Anesthesia Post Evaluation    Patient location during evaluation: PACU  Patient participation: complete - patient participated  Level of consciousness: awake and alert  Pain management: adequate  Airway patency: patent  Cardiovascular status: acceptable  Respiratory status: acceptable  Hydration status: acceptable  Postoperative Nausea and Vomiting: none        No notable events documented.    "

## 2024-06-22 NOTE — BRIEF OP NOTE
"Date: 2024  OR Location: [facility] OR    Name: Jamil Cisse \"Mau\", : 1970, Age: 54 y.o., MRN: [id number], Sex: male    Diagnosis  Pre-op Diagnosis     * Cholesteatoma of left mastoid [H71.22] Post-op Diagnosis     * Cholesteatoma of left mastoid [H71.22]     Procedures  Tympanoplasty; Mastoidectomy with Ossiculoplasty; Cartilage Graft  36626 - SD TYMPP ANTRT/MASTOID W/OSSICULAR CHAIN RECNSTJ    Excision Cartilage Graft Donor Site Ear  96580 - SD GRAFT EAR CRTLG AUTOGENOUS NOSE/EAR      Surgeons      * Jamil Brizuela - Primary    Resident/Fellow/Other Assistant:  Surgeons and Role:     * Anai Fox MD - Assisting     * Bert Amaya MD - Resident - Assisting    Procedure Summary  Anesthesia: General  ASA: ASA status not filed in the log.  Anesthesia Staff: Anesthesiologist: Antonio Tillman MD; Andre Johnson MD  C-AA: LAQUITA Torres  Anesthesia Resident: Allan Bolivar MD; Jose Lawrence MD  Estimated Blood Loss: 10mL  Intra-op Medications:   Administrations occurring from 1330 to 1610 on 24:   Medication Name Total Dose   sodium chloride 0.9 % irrigation solution 1,000 mL   lidocaine-epinephrine (Xylocaine W/EPI) 1 %-1:100,000 injection 10 mL   scopolamine (Transderm-Scop) patch 1 patch 1 patch              Anesthesia Record               Intraprocedure I/O Totals          Intake    Remifentanil Drip 0.00 mL    The total shown is the total volume documented since Anesthesia Start was filed.    Propofol Drip 0.00 mL    The total shown is the total volume documented since Anesthesia Start was filed.    Phenylephrine Drip 0.00 mL    The total shown is the total volume documented since Anesthesia Start was filed.    Total Intake 0 mL          Specimen:   ID Type Source Tests Collected by Time   1 : LEFT MIDDLE EAR AND MASTOID CONTENTS Tissue EAR, MIDDLE EAR CONTENTS LEFT SURGICAL PATHOLOGY EXAM Jamil Brizuela MD 2024 [id number]        Staff:   Scrub Person: " Cee Rabagoub Person: Lindsey  Circulator: Nataliia  Circulator: Karrie Carranza Circulator: Radu Carranza Circulator: Sophie  Relief Scrub: Sophie    Findings:   - Deep retraction pocket of superior mesotympanum, cholesteatoma sac extended into mastoid antrum past the dome of the LSCC and just superficially into perilabyrinthine air cell tract. Complete resection achieved  - Incus eroded with no obvious remnant; stapes suprastructure absent. Footplate intact and mobile. Malleus head partially eroded, resected along with handle  - Small defect of tegmen tympani without CSF leak, remainder of tegmen intact  - Tympanic facial nerve dehiscent; mastoid facial nerve intact  - Mastoidectomy with large posterior atticotomy and facial recess  - OCR with Precise Centered TORP, measuring 4.5mm, from footplate.  - TM reconstructed with cartilage, perichondrium, periosteum.    Complications:  None; patient tolerated the procedure well.     Disposition: PACU - hemodynamically stable.  Condition: stable  Specimens Collected:   ID Type Source Tests Collected by Time   1 : LEFT MIDDLE EAR AND MASTOID CONTENTS Tissue EAR, MIDDLE EAR CONTENTS LEFT SURGICAL PATHOLOGY EXAM Jamil Brizuela MD 6/21/2024 5421     Attending Attestation:     Jamil Brizuela  Phone Number: [phone]

## 2024-06-24 DIAGNOSIS — J30.1 HAY FEVER: Primary | ICD-10-CM

## 2024-06-24 DIAGNOSIS — J30.89 PERENNIAL ALLERGIC RHINITIS: ICD-10-CM

## 2024-06-24 PROCEDURE — 95165 ANTIGEN THERAPY SERVICES: CPT | Performed by: ALLERGY & IMMUNOLOGY

## 2024-06-25 ENCOUNTER — APPOINTMENT (OUTPATIENT)
Dept: ALLERGY | Facility: CLINIC | Age: 54
End: 2024-06-25
Payer: COMMERCIAL

## 2024-06-28 ENCOUNTER — TELEPHONE (OUTPATIENT)
Dept: OTOLARYNGOLOGY | Facility: CLINIC | Age: 54
End: 2024-06-28
Payer: COMMERCIAL

## 2024-06-28 NOTE — TELEPHONE ENCOUNTER
Left confidential voice message to follow up post operatively. Contact information provided for call back.

## 2024-07-01 ENCOUNTER — APPOINTMENT (OUTPATIENT)
Dept: ALLERGY | Facility: CLINIC | Age: 54
End: 2024-07-01
Payer: COMMERCIAL

## 2024-07-02 PROBLEM — I71.21 ANEURYSM OF ASCENDING AORTA WITHOUT RUPTURE (CMS-HCC): Status: ACTIVE | Noted: 2024-06-17

## 2024-07-02 PROBLEM — I35.1 NONRHEUMATIC AORTIC VALVE INSUFFICIENCY: Status: ACTIVE | Noted: 2024-06-17

## 2024-07-03 NOTE — PROGRESS NOTES
Pulmonary ILD and sarcoidosis:    Request of Pulmonary Consult by Trish Ruiz to evaluate Jamil Cisse for dyspnea.   I have independently interviewed and examined the patient in the office and reviewed available records.    Physician HPI (7/8/2024):  A 54 y.o. male with a history of renal cell carcinoma in 2003 status post resection and nephrectomy, Ulcerative colitis on Cimzia (previously was on Humira), aortic aneurysm bicuspid aortic valve GERD, Gout, Hypertension, Mixed hyperlipidemia, Migraines, CKD stage III, WILFRID and Encephalitis (unknown cause), who is a former smoker (26 pack years),   He was referred by Trish Ruiz CNP for worsening shortness of breath.  He was diagnosed with ulcerative colitis 14 years ago by biopsy shows colitis,  diagnosis was reversed by serological markers to combined ulcerative colitis and Crohn's disease he was initially maintained on infliximab and Humira which was not responsive against his colitis also was on steroids on and off.   Infliximab was switched to certolizumab (Cimzia) for which he is maintained on it for the last 6 years frequency of exacerbation of colitis has decreased,  currently his disease is under remission.    He is referred for pulmonary clinic for worsening shortness of breath for the last year and a half he was able to exercise daily for 20 minutes before his complaint of shortness of breath now after few minutes of exercise his shortness of breath and cannot complete his workup.  He has mild infrequent cough sometimes associated with scanty phlegm he denies any fever or chest pain or hemoptysis he has been investigated for respiratory tract infection including TB spot test that was negative.  He had history of COVID infection 3 times in 2020 2021 2022 all of COVID infections where minimal with no associated respiratory failure is also vaccinated against COVID he denies any chest wheezes he has some environmental allergy and is maintained on  montelukast  He was maintained on prednisone from 2000 4/12/2014 on various doses his latest exacerbation was in 2016 requiring steroid and since then he was switched to Cimzia    He also has history of WILFRID severe WILFRID with AHI score of 91 he is maintained on CPAP for the last few years he is using CPAP daily more than 7 hours with no daytime symptoms  He has multiple CT chest reviewing of CT images in February 2024 shows mediastinal lymphadenopathy with left lower lobe infiltrate and bronchiectasis and right upper lobe consolidative mass.  He received short course of steroid in March 2024 repeated CT showed resolution of the right upper lobe infiltrate but persistent of the lower lobe consolidation he had a recent ear surgery.    PFT in June 2023 shows restrictive function repeated PFT in February 2024 shows preserved FEV1 FVC and lung volumes with no affection of DLCO      Past medical history:  Renal cell carcinoma 2003 status post nephrectomy  Ulcerative colitis and Crohn's disease   WILFRID  Bicuspid aortic valve  Aortic aneurysm  Hyperlipidemia  CKD stage III  hypertension      Past surgical history:  Nephrectomy rotator cuff surgery  Hip replacement surgery following crush injury   ear surgery  Hernia repair    Immunization History:  Immunization History   Administered Date(s) Administered    Flu vaccine (IIV4), preservative free *Check age/dose* 10/01/2018, 10/01/2019, 09/30/2020, 11/01/2022, 11/20/2023    Influenza, Unspecified 11/09/2010, 11/01/2022    Influenza, injectable, quadrivalent 09/15/2017    Influenza, seasonal, injectable 10/05/2021    Influenza, seasonal, injectable, preservative free 10/08/2016    PPD Test 11/20/2015    Pfizer Gray Cap SARS-CoV-2 08/08/2022    Pfizer Purple Cap SARS-CoV-2 03/07/2021, 12/23/2021    Zoster vaccine, recombinant, adult (SHINGRIX) 08/01/2023, 11/20/2023       Family History:  Family History   Problem Relation Name Age of Onset    Hypertension Mother      Hypertension  Father      Stroke Father         Social History:  Social History     Socioeconomic History    Marital status:      Spouse name: None    Number of children: None    Years of education: None    Highest education level: None   Occupational History    None   Tobacco Use    Smoking status: Former     Current packs/day: 1.00     Average packs/day: 1 pack/day for 6.0 years (6.0 ttl pk-yrs)     Types: Cigarettes    Smokeless tobacco: Never   Vaping Use    Vaping status: Never Used   Substance and Sexual Activity    Alcohol use: Yes     Alcohol/week: 4.0 standard drinks of alcohol     Types: 4 Cans of beer per week     Comment: SOCIAL    Drug use: Never    Sexual activity: Defer   Other Topics Concern    None   Social History Narrative    None     Social Determinants of Health     Financial Resource Strain: Not on file   Food Insecurity: No Food Insecurity (2/9/2024)    Hunger Vital Sign     Worried About Running Out of Food in the Last Year: Never true     Ran Out of Food in the Last Year: Never true   Transportation Needs: Not on file   Physical Activity: Not on file   Stress: Not on file   Social Connections: Not on file   Intimate Partner Violence: Not on file   Housing Stability: Not on file       Current Medications:  Current Outpatient Medications   Medication Instructions    aspirin-acetaminophen-caffeine (Excedrin Extra Strength) 250-250-65 mg tablet 1 tablet, oral, Every 6 hours PRN    aspirin 81 mg, oral, Daily    atorvastatin (LIPITOR) 20 mg, oral, Daily    cetirizine (ZyrTEC) 10 mg tablet oral, Daily RT    Cimzia Powder for Reconst 200 mg, subcutaneous, Every 14 days    clindamycin (Cleocin) 300 mg capsule TAKE 2 CAPSULES BY MOUTH ONE HOUR BEFORE APPOINTMENT    colchicine 0.6 mg, oral, Daily    febuxostat (ULORIC) 40 mg, oral, Daily    fluticasone (Flonase) 50 mcg/actuation nasal spray 2 sprays, Each Nostril, Daily    Maxalt-MLT 10 mg, oral, Once as needed, May repeat in 2 hours if unresolved. Do not  "exceed 30 mg in 24 hours.    montelukast (SINGULAIR) 10 mg, oral, Nightly    omeprazole (PRILOSEC) 40 mg, oral, Daily    predniSONE (Deltasone) 20 mg tablet Take 1.5 tablets (30 mg) by mouth once daily for 10 days, THEN 1 tablet (20 mg) once daily for 10 days, THEN 0.5 tablets (10 mg) once daily for 10 days.    telmisartan-hydrochlorothiazid (MIcarDIS HCT) 80-25 mg tablet 1 tablet, oral, Daily    traMADol (ULTRAM) 50 mg, oral, Every 6 hours PRN    Xiidra 5 % dropperette INSTILL 1 DROP IN BOTH EYES TWICE A DAY        Drug Allergies/Intolerances:  Allergies   Allergen Reactions    Adhesive Tape-Silicones Rash and Other     skin pulled off    Codeine Other     \"Skin got so hot\"    Sulfa (Sulfonamide Antibiotics) Other    Adhesive Other and Unknown     Pulls skin off    Amlodipine Besylate Rash     Flush    Lisinopril Other     ineffective    Sumatriptan Other     ineffective    Valsartan Other     ineffective        Review of Systems:  Review of Systems     All other review of systems are negative and/or non-contributory.    Physical Examination:  /83 (BP Location: Left arm, Patient Position: Sitting, BP Cuff Size: Large adult)   Pulse 102   Temp 36.9 °C (98.4 °F) (Temporal)   Resp 16   Ht 1.753 m (5' 9\")   Wt 101 kg (222 lb)   SpO2 94%   BMI 32.78 kg/m²      General: ambulated independently; no acute distress; well-nourished; work of breathing was not increased; normal vocal character  HEENT: Scar of left ear surgery   neck: supple; no lymphadenopathy or thyromegaly.  Chest: clear to auscultation bilaterally; no chest wall deformity.  Cardiac: Systolic murmur on aortic area   abdomen: soft; non-tender; non-distended; no hepatosplenomegaly.  Extremities: no leg edema; no digital clubbing; 2+ pulses  Psychiatric: did not appear depressed or anxious.    Pulmonary Function Test Results     Study Result - 2/29/24    Narrative & Impression   The FEV1/FVC is normal. The FEV1 is normal. The FVC is normal. " "Following administration of bronchodilators, there is no significant response. The TLC by body plethysmography is normal. The DLCO is normal; however, the diffusing capacity was not corrected for the patient's hemoglobin. The airways resistance is normal. Conclusion: Normal spirometry. Normal lung volumes by plethysmography. The DLCO is normal.  Normal PFT.Recommendation:  MCCT recommended for Asthma for patient's symptoms of dyspnea.         Chest Radiograph     No Testing Done      Echocardiogram     No results found for this or any previous visit from the past 365 days.       Chest CT Scan     CT angio chest w and wo IV contrast 06/17/2024    Narrative  * * *Final Report* * *    DATE OF EXAM: Jun 17 2024  8:36AM    [facility]   0125  -  CTA CHEST (GATED) W IVCON  / ACCESSION #  [id number]    PROCEDURE REASON: multiple diagnoses    * * * * Physician Interpretation * * * *    CTA Aorta chest  Direct Image Comparison: Outside noncontrast CT chest 02/20/2024    HISTORY: 54 year old Male with h/o suspected aortic disease  Evaluation for diagnostic clarification and further treatment options.  There is request to define thoracic and aortic anatomy    TECHNIQUE: SCANNER: Siemens Somatom Force Dual source 2x192-slice scanner  PROTOCOL: Prospectively triggered helical high-pitch acquisitions  (\"triggered Flash-mode\") was performed  following the intravenous  administration of contrast material.  Scan Range: thoracic inlet to the diaphragm  CT Dose-Length Product (DLP): 226 mGy*cm  CT Dose Reduction Employed: Automated exposure control(AEC) and iterative  recon  CONTRAST: IV administration of 80 ml Omnipaque 350  Scan acquisition: uncomplicated  Macro Version: [id number]  For optimization of anatomic evaluation, advanced 3-D off-line  postprocessing was performed on a dedicated workstation by the  interpreting physician.  STUDY LIMITATIONS: None..    RESULT:  LINES, TUBES and DEVICES: None  CHEST: Chest wall anatomy: unremarkable. " Small partially calcified nodule  in the right thyroid lobe.  LUNGS: Subpleural reticular opacities with superimposed groundglass  predominantly involving the left lower lobe.  Clinical correlation is  recommended.  Previously mentioned large part solid right upper lobe  nodule is no longer seen.  MEDIASTINUM: mediastinal and hilar lymphadenopathy. Clinical correlation  is recommended.  PERICARDIUM: unremarkable  CENTRAL PULMONARY ARTERY: normal dimensions. Assessment is limited due to  limited contrast enhancement.    CARDIAC CHAMBERS:  LEFT VENTRICLE: concentric hypertrophy.  RIGHT VENTRICLE: normal size  Left atrium: normal size.  IVETH: normal  Right atrium: normal size  CENTRAL VENOUS and PULMONARY VENOUS RETURN: normal.  Coronary Sinus: normal size    MITRAL VALVE: assessment is limited in the current study  - no leaflet calcification. No annular calcification  TRICUSPID and PULMONIC VALVE: appear unremarkable.    CORONARY ANATOMY: normal origin of the coronary arteries.  No definitive evidence of calcified atherosclerotic changes of the  coronary arteries.    AORTIC VALVE: appears bicuspid, with calcified raphe at the RCC/LCC.  Mild-moderate leaflet calcification.  Incomplete diastolic coaptation,  suggesting aortic insufficiency.  AORTA:  Aortic Size: Dilation aortic root and ascending aorta.  STJ: partially effaced. No calcification.  Wall Changes: no evidence of wall changes.  Arch Branch Vessels: Unremarkable proximal segments of the arch branch  vessels. Normal origin of the arch branch vessels. Mild partially  calcified wall changes.  AORTIC DIMENSIONS:  AORTIC ROOT: 4.2 cm measured sinus-to-sinus  mid ASCENDING THORACIC AORTA: 4.4 cm  mid AORTIC ARCH: 3.0 cm  mid DESCENDING THORACIC AORTA: 2.5 cm    limited upper ABDOMEN: unremarkable  BONES and SOFT TISSUES: degenerative changes of the thoracic spine.   (topogram) images: No additional findings.    Impression  IMPRESSION:    1.  Dilation of the  aortic root (4.2 cm), and ascending thoracic aorta  (4.4 cm).  The remainder of the thoracic aorta is normal in course,  contour, and caliber, without acute pathology.  Likely stable dimensions  as compared to outside CT chest without contrast 02/20/2024.    2.  Likely bicuspid aortic valve with calcified raphe at the RCC/LCC.  Small diastolic coaptation gap suggestive of aortic regurgitation.    3. Stable to slightly improved subpleural reticular opacities with   superimposed groundglass predominantly involving the left lower lobe.  Clinical correlation is recommended.  Previously mentioned large part  solid right upper lobe nodule is no longer seen.    3. There are prominent mediastinal and hilar lymph nodes, clinical  correlation is recommended.    4. Small thyroid nodule Incidental Finding:  No follow-up imaging  recommended for this small < 1.0cm incidentally detected thyroid  nodule(s) in a patient with no known thyroid disease.  American Thy[employer] Association 2009            : MIROSLAVA  Transcribe Date/Time: Jun 17 2024  8:43A    Dictated by : ROBERTO BROWN MD    This examination was interpreted and the report reviewed and  electronically signed by:  KOURTNEY NAIK MD on Jun 17 2024  4:31PM  EST       Co-morbidities Problem List    Assessment and Plan / Recommendations:  Problem List Items Addressed This Visit       Bicuspid aortic valve (HHS-HCC)    Overview     Noted on 2/29/2024 ECHO.         Relevant Orders    Transthoracic Echo Complete     Other Visit Diagnoses       ILD (interstitial lung disease) (Multi)    -  Primary    Relevant Medications    predniSONE (Deltasone) 20 mg tablet    Other Relevant Orders    Complete Pulmonary Function Test Pre/Post Bronchodialator (Spirometry Pre/Post/DLCO/Lung Volumes)    Combined Full (Complete) PFT and 6 Min Walk    C-reactive protein    Sedimentation rate, automated    Angiotensin Converting Enzyme    CBC and Auto Differential    Comprehensive Metabolic  Panel    Calprotectin Stool    CT chest high resolution           1-Left lower lobe infiltrate:  Appears on CT chest done in June 2024, previous CT in February 2024 shows similar infiltrate  Right upper lobe nodule cleared in the new CT as compared to the previous one.  Differential diagnoses include drug-induced reaction to Cimzia, versus infectious  No signs of active infection negative TB spot test  - Bronchoscopy with transbronchial biopsy of left lower lobe given undiagnosed nature of the left lower lobe consolidation  -Order high-resolution CT  Less likely possibility pulmonary complication of Crohn's disease and ulcerative colitis less likely possibility given the disease remission for the last few years  -Will check disease activity by ESR C-reactive protein fecal calprotectin   -Check CBC with differential  patient is scheduled for colonoscopy to assess colitis    2-Mediastinal lymphadenopathy:  differential diagnoses include, infection versus neoplasm (lymphoma association between TNF alpha and lymphoma) versus sarcoid (less likely given the small size enlargement of the lymph node:  Referral for EBUS with lymph node biopsy    3-Remarkable shortness of breath:  PFT in February 2024 within normal limits  6-minute walking test was tested no evidence of desaturation but remarkable shortness of breath after 3 minutes  Shortness of breath is out of proportion of lung infection  (Although the positive finding in CT chest the degree of shortness of breath not corresponding to the degree of lung infiltrate)  Given underlying history of bicuspid aortic valve and aortic aneurysm, will check transechocardiography, for cardiac evaluation     4-WILFRID:  Compliant with CPAP use  Minimal residual AHI    5-Bicuspid aortic valve, combined aortic valve stenosis and regurge: moderate degree:  Aortic valve area is 0.53 cm  Aortic aneurysm: Scheduled for transesophageal echo  Aortic stenosis Can be the the underlying etiology of  remarkable SOB       I have independently interviewed and examined the patient in the office and reviewed available record  I personally reviewed CT images echo reports and lab results  Professional time of this encounter 70 minutes    Follow-up in 2 months        Jacques Amado MD  07/08/2024

## 2024-07-08 ENCOUNTER — APPOINTMENT (OUTPATIENT)
Dept: PULMONOLOGY | Facility: CLINIC | Age: 54
End: 2024-07-08
Payer: COMMERCIAL

## 2024-07-08 ENCOUNTER — OFFICE VISIT (OUTPATIENT)
Dept: ALLERGY | Facility: CLINIC | Age: 54
End: 2024-07-08
Payer: COMMERCIAL

## 2024-07-08 VITALS
HEIGHT: 69 IN | TEMPERATURE: 98.4 F | WEIGHT: 222 LBS | BODY MASS INDEX: 32.88 KG/M2 | RESPIRATION RATE: 16 BRPM | HEART RATE: 102 BPM | DIASTOLIC BLOOD PRESSURE: 83 MMHG | SYSTOLIC BLOOD PRESSURE: 119 MMHG | OXYGEN SATURATION: 94 %

## 2024-07-08 VITALS — HEIGHT: 69 IN | WEIGHT: 220 LBS | BODY MASS INDEX: 32.58 KG/M2

## 2024-07-08 DIAGNOSIS — J30.9 ALLERGIC RHINITIS, UNSPECIFIED SEASONALITY, UNSPECIFIED TRIGGER: Primary | ICD-10-CM

## 2024-07-08 DIAGNOSIS — R59.0 MEDIASTINAL LYMPHADENOPATHY: ICD-10-CM

## 2024-07-08 DIAGNOSIS — R59.1 LYMPHADENOPATHY: ICD-10-CM

## 2024-07-08 DIAGNOSIS — J84.9 ILD (INTERSTITIAL LUNG DISEASE) (MULTI): Primary | ICD-10-CM

## 2024-07-08 DIAGNOSIS — Q23.1 BICUSPID AORTIC VALVE (HHS-HCC): ICD-10-CM

## 2024-07-08 PROCEDURE — 95117 IMMUNOTHERAPY INJECTIONS: CPT | Performed by: ALLERGY & IMMUNOLOGY

## 2024-07-08 PROCEDURE — 3079F DIAST BP 80-89 MM HG: CPT | Performed by: INTERNAL MEDICINE

## 2024-07-08 PROCEDURE — 1036F TOBACCO NON-USER: CPT | Performed by: ALLERGY & IMMUNOLOGY

## 2024-07-08 PROCEDURE — 3008F BODY MASS INDEX DOCD: CPT | Performed by: INTERNAL MEDICINE

## 2024-07-08 PROCEDURE — 99417 PROLNG OP E/M EACH 15 MIN: CPT | Performed by: INTERNAL MEDICINE

## 2024-07-08 PROCEDURE — 99205 OFFICE O/P NEW HI 60 MIN: CPT | Performed by: INTERNAL MEDICINE

## 2024-07-08 PROCEDURE — 3074F SYST BP LT 130 MM HG: CPT | Performed by: INTERNAL MEDICINE

## 2024-07-08 PROCEDURE — 3008F BODY MASS INDEX DOCD: CPT | Performed by: ALLERGY & IMMUNOLOGY

## 2024-07-08 PROCEDURE — 99213 OFFICE O/P EST LOW 20 MIN: CPT | Performed by: ALLERGY & IMMUNOLOGY

## 2024-07-08 PROCEDURE — 1036F TOBACCO NON-USER: CPT | Performed by: INTERNAL MEDICINE

## 2024-07-08 RX ORDER — PREDNISONE 20 MG/1
TABLET ORAL DAILY
Qty: 30 TABLET | Refills: 0 | Status: SHIPPED | OUTPATIENT
Start: 2024-07-08 | End: 2024-08-07

## 2024-07-08 ASSESSMENT — ENCOUNTER SYMPTOMS: SHORTNESS OF BREATH: 1

## 2024-07-08 NOTE — PROGRESS NOTES
"  Subjective   Patient ID:   [id number]   Jamil Cisse \"Mau\" is a 54 y.o. male who presents for Follow-up.    Chief Complaint   Patient presents with    Follow-up      Started IT July 2023    Since last visit, 1-16-24, patient reports is S/P cholesteatoma surgery by and reconstruction 6-21-24 by Dr. Brizuela.  He feels much improved currently.      Patient did see Pulmonology who ordered a CT scan that showed something in his lungs and kidney.  As a result, he is seeing 5 more specialists including a nephrologist.  He was found to have a bicuspid valve and aneurysm and is scheduled for a TTE next week.  He was found to have nodules in his lungs and will be started on steroids again.  She could not tell him why he is experiencing SOB but he was referred to Dr. Felipe.  His inhaler is not helping.  He is not sure if the steroids are helping or not.  He continues montelukast  but reports it is ineffective so he is asking if should discontinue it.    Shots are going well and he denies local reactions.  He states his allergies are improved and he is taking less medication compared to prior.    Patient is off work throughout the summer.        Review of Systems   Respiratory:  Positive for shortness of breath.      Objective     Ht 1.753 m (5' 9\")   Wt 99.8 kg (220 lb)   BMI 32.49 kg/m²      Physical Exam  Constitutional:       Appearance: Normal appearance.   HENT:      Head: Normocephalic and atraumatic.      Right Ear: External ear normal. There is no impacted cerumen.      Left Ear: External ear normal. There is no impacted cerumen.      Nose: No congestion or rhinorrhea.   Eyes:      Extraocular Movements: Extraocular movements intact.      Conjunctiva/sclera: Conjunctivae normal.      Pupils: Pupils are equal, round, and reactive to light.   Cardiovascular:      Rate and Rhythm: Normal rate and regular rhythm.      Heart sounds: No murmur heard.     No friction rub. No gallop.   Pulmonary:      Effort: No " respiratory distress.      Breath sounds: No wheezing, rhonchi or rales.   Skin:     General: Skin is warm and dry.   Neurological:      Mental Status: He is alert.   Psychiatric:         Mood and Affect: Mood normal.         Behavior: Behavior normal.         Assessment/Plan     Allergic rhinitis  Shots are going well, no reactions.  He will stop montelukast because it does not work for him.    Shot was administered today.    He will continue Flonase PRN.      By signing my name below, I, Hima Tsangibgaurang, attest that this documentation has been prepared under the direction and in the presence of Donita De La Cruz MD.  All medical record entries made by the Scribe were at my direction and personally dictated by me. I have reviewed the chart and agree that the record accurately reflects my personal performance of the history, physical exam, discussion and plan.

## 2024-07-08 NOTE — PATIENT INSTRUCTIONS
Shot was administered today.    Stop montelukast.    Continue Flonase as needed.    Follow up for shots as scheduled.  Otherwise, follow up in 1 year, unless symptoms arise sooner.

## 2024-07-08 NOTE — ASSESSMENT & PLAN NOTE
Shots are going well, no reactions.  He will stop montelukast because it does not work for him.    Shot was administered today.    He will continue Flonase PRN.

## 2024-07-09 ENCOUNTER — APPOINTMENT (OUTPATIENT)
Dept: PRIMARY CARE | Facility: CLINIC | Age: 54
End: 2024-07-09
Payer: COMMERCIAL

## 2024-07-09 ENCOUNTER — LAB (OUTPATIENT)
Dept: LAB | Facility: LAB | Age: 54
End: 2024-07-09
Payer: COMMERCIAL

## 2024-07-09 VITALS
OXYGEN SATURATION: 94 % | SYSTOLIC BLOOD PRESSURE: 122 MMHG | BODY MASS INDEX: 32.85 KG/M2 | DIASTOLIC BLOOD PRESSURE: 74 MMHG | HEART RATE: 90 BPM | WEIGHT: 221.8 LBS | HEIGHT: 69 IN | TEMPERATURE: 97.9 F

## 2024-07-09 DIAGNOSIS — I71.21 ANEURYSM OF ASCENDING AORTA WITHOUT RUPTURE (CMS-HCC): ICD-10-CM

## 2024-07-09 DIAGNOSIS — I10 PRIMARY HYPERTENSION: Primary | ICD-10-CM

## 2024-07-09 DIAGNOSIS — E78.2 HYPERLIPEMIA, MIXED: ICD-10-CM

## 2024-07-09 DIAGNOSIS — R93.89 ABNORMAL CT OF THE CHEST: Primary | ICD-10-CM

## 2024-07-09 DIAGNOSIS — R06.02 SOB (SHORTNESS OF BREATH): ICD-10-CM

## 2024-07-09 DIAGNOSIS — M1A.9XX0 CHRONIC GOUT WITHOUT TOPHUS, UNSPECIFIED CAUSE, UNSPECIFIED SITE: ICD-10-CM

## 2024-07-09 DIAGNOSIS — K21.9 GASTROESOPHAGEAL REFLUX DISEASE WITHOUT ESOPHAGITIS: ICD-10-CM

## 2024-07-09 DIAGNOSIS — K50.919 CROHN'S DISEASE WITH COMPLICATION, UNSPECIFIED GASTROINTESTINAL TRACT LOCATION (MULTI): ICD-10-CM

## 2024-07-09 DIAGNOSIS — G43.009 MIGRAINE WITHOUT AURA AND WITHOUT STATUS MIGRAINOSUS, NOT INTRACTABLE: ICD-10-CM

## 2024-07-09 DIAGNOSIS — Q23.1 BICUSPID AORTIC VALVE (HHS-HCC): ICD-10-CM

## 2024-07-09 DIAGNOSIS — G47.33 OBSTRUCTIVE SLEEP APNEA: ICD-10-CM

## 2024-07-09 DIAGNOSIS — N18.31 STAGE 3A CHRONIC KIDNEY DISEASE (MULTI): ICD-10-CM

## 2024-07-09 DIAGNOSIS — J84.9 ILD (INTERSTITIAL LUNG DISEASE) (MULTI): ICD-10-CM

## 2024-07-09 LAB
ALBUMIN SERPL BCP-MCNC: 4.2 G/DL (ref 3.4–5)
ALP SERPL-CCNC: 66 U/L (ref 33–120)
ALT SERPL W P-5'-P-CCNC: 56 U/L (ref 10–52)
ANION GAP SERPL CALC-SCNC: 12 MMOL/L (ref 10–20)
AST SERPL W P-5'-P-CCNC: 37 U/L (ref 9–39)
BASOPHILS # BLD AUTO: 0.06 X10*3/UL (ref 0–0.1)
BASOPHILS NFR BLD AUTO: 1 %
BILIRUB SERPL-MCNC: 0.5 MG/DL (ref 0–1.2)
BUN SERPL-MCNC: 21 MG/DL (ref 6–23)
CALCIUM SERPL-MCNC: 9.9 MG/DL (ref 8.6–10.3)
CHLORIDE SERPL-SCNC: 104 MMOL/L (ref 98–107)
CHOLEST SERPL-MCNC: 154 MG/DL (ref 0–199)
CHOLESTEROL/HDL RATIO: 5
CO2 SERPL-SCNC: 28 MMOL/L (ref 21–32)
CREAT SERPL-MCNC: 1.38 MG/DL (ref 0.5–1.3)
CRP SERPL-MCNC: 0.84 MG/DL
EGFRCR SERPLBLD CKD-EPI 2021: 61 ML/MIN/1.73M*2
EOSINOPHIL # BLD AUTO: 0.27 X10*3/UL (ref 0–0.7)
EOSINOPHIL NFR BLD AUTO: 4.3 %
ERYTHROCYTE [DISTWIDTH] IN BLOOD BY AUTOMATED COUNT: 19.1 % (ref 11.5–14.5)
ERYTHROCYTE [SEDIMENTATION RATE] IN BLOOD BY WESTERGREN METHOD: 13 MM/H (ref 0–20)
GLUCOSE SERPL-MCNC: 92 MG/DL (ref 74–99)
HCT VFR BLD AUTO: 46.6 % (ref 41–52)
HDLC SERPL-MCNC: 30.5 MG/DL
HGB BLD-MCNC: 14.4 G/DL (ref 13.5–17.5)
IMM GRANULOCYTES # BLD AUTO: 0.01 X10*3/UL (ref 0–0.7)
IMM GRANULOCYTES NFR BLD AUTO: 0.2 % (ref 0–0.9)
LDLC SERPL CALC-MCNC: 88 MG/DL
LYMPHOCYTES # BLD AUTO: 2.16 X10*3/UL (ref 1.2–4.8)
LYMPHOCYTES NFR BLD AUTO: 34.4 %
MCH RBC QN AUTO: 23.5 PG (ref 26–34)
MCHC RBC AUTO-ENTMCNC: 30.9 G/DL (ref 32–36)
MCV RBC AUTO: 76 FL (ref 80–100)
MONOCYTES # BLD AUTO: 0.55 X10*3/UL (ref 0.1–1)
MONOCYTES NFR BLD AUTO: 8.8 %
NEUTROPHILS # BLD AUTO: 3.23 X10*3/UL (ref 1.2–7.7)
NEUTROPHILS NFR BLD AUTO: 51.3 %
NON HDL CHOLESTEROL: 124 MG/DL (ref 0–149)
NRBC BLD-RTO: 0 /100 WBCS (ref 0–0)
PLATELET # BLD AUTO: 260 X10*3/UL (ref 150–450)
POTASSIUM SERPL-SCNC: 4 MMOL/L (ref 3.5–5.3)
PROT SERPL-MCNC: 7.7 G/DL (ref 6.4–8.2)
RBC # BLD AUTO: 6.13 X10*6/UL (ref 4.5–5.9)
SODIUM SERPL-SCNC: 140 MMOL/L (ref 136–145)
TRIGL SERPL-MCNC: 180 MG/DL (ref 0–149)
URATE SERPL-MCNC: 5.6 MG/DL (ref 4–7.5)
VLDL: 36 MG/DL (ref 0–40)
WBC # BLD AUTO: 6.3 X10*3/UL (ref 4.4–11.3)

## 2024-07-09 PROCEDURE — 3078F DIAST BP <80 MM HG: CPT | Performed by: FAMILY MEDICINE

## 2024-07-09 PROCEDURE — 84550 ASSAY OF BLOOD/URIC ACID: CPT

## 2024-07-09 PROCEDURE — 80061 LIPID PANEL: CPT

## 2024-07-09 PROCEDURE — 36415 COLL VENOUS BLD VENIPUNCTURE: CPT

## 2024-07-09 PROCEDURE — 3074F SYST BP LT 130 MM HG: CPT | Performed by: FAMILY MEDICINE

## 2024-07-09 PROCEDURE — 99214 OFFICE O/P EST MOD 30 MIN: CPT | Performed by: FAMILY MEDICINE

## 2024-07-09 PROCEDURE — 85025 COMPLETE CBC W/AUTO DIFF WBC: CPT

## 2024-07-09 PROCEDURE — 3008F BODY MASS INDEX DOCD: CPT | Performed by: FAMILY MEDICINE

## 2024-07-09 PROCEDURE — 85652 RBC SED RATE AUTOMATED: CPT

## 2024-07-09 PROCEDURE — 80053 COMPREHEN METABOLIC PANEL: CPT

## 2024-07-09 PROCEDURE — 86140 C-REACTIVE PROTEIN: CPT

## 2024-07-09 PROCEDURE — 82164 ANGIOTENSIN I ENZYME TEST: CPT

## 2024-07-09 RX ORDER — COLCHICINE 0.6 MG/1
0.6 TABLET ORAL DAILY
Qty: 90 TABLET | Refills: 0 | Status: SHIPPED | OUTPATIENT
Start: 2024-07-09

## 2024-07-09 RX ORDER — FEBUXOSTAT 40 MG/1
40 TABLET, FILM COATED ORAL DAILY
Qty: 90 TABLET | Refills: 0 | Status: SHIPPED | OUTPATIENT
Start: 2024-07-09

## 2024-07-09 ASSESSMENT — PATIENT HEALTH QUESTIONNAIRE - PHQ9
1. LITTLE INTEREST OR PLEASURE IN DOING THINGS: NOT AT ALL
2. FEELING DOWN, DEPRESSED OR HOPELESS: NOT AT ALL
SUM OF ALL RESPONSES TO PHQ9 QUESTIONS 1 AND 2: 0

## 2024-07-09 NOTE — PATIENT INSTRUCTIONS
Labs today.  We may be able to stop the colchicine if uric acid is at goal.    Follow up in 3 months.    It was a pleasure to see you today. Thank you for choosing us for your health care needs.    If you have lab or other testing completed and have not been informed of results within one week, please call the office for your results.    If you haven't done so, consider signing up for [facility], the [facility] personal health record. Ask the staff how you can get started.

## 2024-07-09 NOTE — PROGRESS NOTES
Bronchoscopy Scheduling Request    Pre-bronchoscopy visit: Follow-up visit with Bronchoscopy group provider  Please schedule procedure: Next available    Cytology on-site:  Yes  Location:  Either location  Performing physician:  Advanced diagnostic bronchoscopist  Referring physician:  Jacques Amado MD, Leo Flores,   Indication:  LAD, LLL infiltrate  Sedation / Anesthesia:  GA  Procedure:  BAL, TBBx, Dx EBUS  Time:  Tier 2  Fluorscopy:   Yes  Imaging needed:  None  Labs:  CBC, BMP- done and Ok 7/9/24  Meds:  None  Special Considerations:  Would like ok to hold Cimzia for biopsies (anti TNF- risk of bleeding, infections).  Hx of UC on immunosuppressants, consideration for infection, lymphoma, ILD, Sarcoid type process; Request is for EBUS of at least right sided LAD, and TBBx, also consider BAL and testing for infectious etiology; PAT and Cardiology clearance- AC stenosis, abnormal cardiac testing in past   Reviewed by:  Chichi Espinoza MD

## 2024-07-09 NOTE — PROGRESS NOTES
Subjective   Patient ID: Mau Cisse is a 54 y.o. male who presents for Follow-up.    HPI     Patient stopped Nebivolol after 2 days as he was experiencing side effects of headaches, nausea, and fatigue.  Symptoms were better off the medication.    Has continued to have some SOB.  Has seen pulm.  Planning a bronchoscopy with transbronchial biopsy of left lower lobe given undiagnosed nature of the left lower lobe consolidation.  Also referred for EBUS with lymph node bx for his noted mediastinal lymphadenopathy.  He is to undergo another PFT.     He has a bicuspid aortic valve along with some dilation of the ascending aorta and aortic root.  Pt states he was told that he will need surgery at some point in the future and would plan to repair the aorta and bicuspid valve all at once.  Considering the aortic stenosis as underlying cause for the SOB as this was noted on ECHO.     He states that at this point pulmonology does not think the SOB is lung related and cardiology does not thing the SOB id cardiac related    Still having SOB with exertional activities.    Patient has hypertension.  He does not monitor BP at home.   Denies CP, SOB, dizziness, and LE edema.   Patient is compliant with his antihypertensive therapy and denies any noted side effects.     He has hyperlipidemia.  Patient has been compliant with his statin therapy and denies any noted side effects.  Pt does try to reduce the amount of cholesterol and fatty foods he consumes.     Pt has GERD.  GERD is stable with the current dose of omeprazole.   He denies any breakthrough reflux symptoms.     Patient has allergic rhinitis  His allergies are well controlled with a combination of Zyrtec and fluticasone nasal spray.     Pt has gout.  He takes Colchicine prn.   He denies any flare-ups since his last visit here.     He has a history of osteoarthritis of both hips.  Has undergone bilat hip replacements.     Pt has Crohn's disease.  He sees GI ( Dr. Richard)  "on a regular basis.   He receives Cimzia injections TWICE a month of Cimzia.  He currently denies any abdominal pain, nausea, vomiting, diarrhea, melena, or hematochezia.     Patient has migraine headaches.   He takes Maxalt as needed for his migraines.  Patient has been unable to take the generic version of Maxalt (rizatriptan) as it gives him dizziness, upset stomach, nausea, and lethargy.   He tolerates the brand-name medication without noted side effects.            Review of Systems  Constitutional: Patient denies any fever, chills, loss of appetite, or unexplained weight loss.    Cardiovascular: Patient denies any chest pain, tachycardia, palpitations, orthopnea, or paroxysmal nocturnal dyspnea.  (+) shortness of breath with exertion that is unchanged for the most part.    Respiratory: Patient denies any cough, shortness breath as rest.  No noted wheezing.  Gastrointestinal: Patient denies any nausea, vomiting, diarrhea, constipation, melena, hematochezia, or reflux symptoms  Skin: Denies any rashes or skin lesions  Neurology: Patient denies any new motor or sensory losses. Denies any numbness, tingling, weakness, and incoordination of the extremities. Patient also denies any tremor, seizures, or gait instability.  Endocrinology: Denies any polyuria, polydipsia, polyphagia, or heat/cold intolerance.      Objective   /87   Pulse 90   Temp 36.6 °C (97.9 °F) (Temporal)   Ht 1.753 m (5' 9\")   Wt 101 kg (221 lb 12.8 oz)   SpO2 94%   BMI 32.75 kg/m²     Physical Exam  General Appearance: Alert and cooperative, in no acute distress, well-developed/well-nourished, obese male.     Neck: Supple and without adenopathy or rigidity. There is no JVD at 90° and no carotid bruits are noted. There is no thyromegaly, thyroid tenderness, or palpable thyroid nodules.    Heart: RRR with grade 1/6 murmur @ RSB and no noted ectopy.    Lungs: Clear to auscultation bilaterally with good air exchange.  Skin: Good turgor, " moist, warm and without rashes or lesions.  Neurological exam: Alert and oriented ×3, no tremor, normal gait.  Extremities: No clubbing, cyanosis, or edema      Assessment/Plan   HTN:   7/9/2024: He discontinued Nebivolol due to side effects.   We will continue with current medications.    HLD: Stable based on recent labs.  Risk factor reduction for CAD was discussed at length.  He has tolerated the statin therapy without side effects.  3/28/2024: Stress test normal.     GERD: Stable based on symptoms.  He will continue the current dosing of omeprazole and appropriate dietary changes.     Migraine headaches: Stable on the current treatment plan.  Patient will continue to use the Maxalt as needed. The brand-name Maxalt has continued to work well.  HE CAN NOT TOLERATE THE GENERIC FORM AS HE EXPERIENCES SIDE EFFECTS OF DIZZINESS, UPSET STOMACH, NAUSEA, AND LETHARGY.      Crohn's Disease: Stable based on symptoms.  We will continue the current medication and continue to follow with his GI specialist.     Gout: Stable based on symptoms as has not had a recent flare.  We will continue to monitor.  We discussed goal of getting his uric acid level below 6.  At that point we can consider stopping the Colchicine.      Stage 3a CKD: Stable based on labs.  He was encouraged to avoid NSAIDs.   Pt will be establishing with a nephrologist, Dr. Kmep on 5/14/2024.  He is currently stable per nephrology and will be seen once a year.      Obstructive sleep apnea:  He was started on CPAP but could not tolerate the pressure at 16 cm H2O.   Was changed to Auto pap and referred to sleep medicine for evaluation and suggestions on best course of action.  He will discuss with his pulmonologist as his nurse practitioner is referring him to a pulmonologist.     SOB with exertion:  Symptoms have persisted.  Has seen  pulmonology and cardiology at this point.  No underlying cause positively identified yet.  evaluation.  Undergoing further  work up.    Aneurysm of the ascending aorta / Bicuspid aortic valve:   Pt has seen cardiology and has follow up scheduled.  He has seen cardiac surgeon at the [facility] for an opinion as well.  Will need surgical repair at some point in the future.    Follow up in 3 months.       Scribe Attestation  By signing my name below, I, Alhaji Martinez   attest that this documentation has been prepared under the direction and in the presence of Leo Flores DO.    Orders Placed This Encounter   Procedures    Uric acid    Lipid Panel     Requested Prescriptions     Signed Prescriptions Disp Refills    febuxostat (Uloric) 40 mg tablet 90 tablet 0     Sig: Take 1 tablet (40 mg) by mouth once daily.    colchicine 0.6 mg tablet 90 tablet 0     Sig: Take 1 tablet (0.6 mg) by mouth once daily.

## 2024-07-12 ENCOUNTER — OFFICE VISIT (OUTPATIENT)
Dept: OTOLARYNGOLOGY | Facility: CLINIC | Age: 54
End: 2024-07-12
Payer: COMMERCIAL

## 2024-07-12 VITALS
WEIGHT: 223 LBS | HEART RATE: 106 BPM | HEIGHT: 69 IN | TEMPERATURE: 98.3 F | SYSTOLIC BLOOD PRESSURE: 129 MMHG | DIASTOLIC BLOOD PRESSURE: 84 MMHG | BODY MASS INDEX: 33.03 KG/M2

## 2024-07-12 DIAGNOSIS — H60.393 OTHER INFECTIVE CHRONIC OTITIS EXTERNA OF BOTH EARS: ICD-10-CM

## 2024-07-12 DIAGNOSIS — H90.6 MIXED HEARING LOSS, BILATERAL: ICD-10-CM

## 2024-07-12 DIAGNOSIS — H72.91 TYMPANIC MEMBRANE PERFORATION, RIGHT: ICD-10-CM

## 2024-07-12 DIAGNOSIS — H71.02 CHOLESTEATOMA OF ATTIC OF LEFT EAR: Primary | ICD-10-CM

## 2024-07-12 LAB — ACE SERPL-CCNC: 99 U/L (ref 16–85)

## 2024-07-12 PROCEDURE — 99211 OFF/OP EST MAY X REQ PHY/QHP: CPT | Performed by: STUDENT IN AN ORGANIZED HEALTH CARE EDUCATION/TRAINING PROGRAM

## 2024-07-12 PROCEDURE — 3074F SYST BP LT 130 MM HG: CPT | Performed by: STUDENT IN AN ORGANIZED HEALTH CARE EDUCATION/TRAINING PROGRAM

## 2024-07-12 PROCEDURE — 3079F DIAST BP 80-89 MM HG: CPT | Performed by: STUDENT IN AN ORGANIZED HEALTH CARE EDUCATION/TRAINING PROGRAM

## 2024-07-12 PROCEDURE — 1036F TOBACCO NON-USER: CPT | Performed by: STUDENT IN AN ORGANIZED HEALTH CARE EDUCATION/TRAINING PROGRAM

## 2024-07-12 PROCEDURE — 3008F BODY MASS INDEX DOCD: CPT | Performed by: STUDENT IN AN ORGANIZED HEALTH CARE EDUCATION/TRAINING PROGRAM

## 2024-07-12 RX ORDER — CIPROFLOXACIN AND DEXAMETHASONE 3; 1 MG/ML; MG/ML
SUSPENSION/ DROPS AURICULAR (OTIC)
Qty: 7.5 ML | Refills: 0 | Status: SHIPPED | OUTPATIENT
Start: 2024-07-12

## 2024-07-12 ASSESSMENT — PAIN SCALES - GENERAL: PAINLEVEL: 2

## 2024-07-12 ASSESSMENT — ENCOUNTER SYMPTOMS
OCCASIONAL FEELINGS OF UNSTEADINESS: 0
DEPRESSION: 0
LOSS OF SENSATION IN FEET: 0

## 2024-07-12 NOTE — PROGRESS NOTES
"Date of surgery: 6/21/2024      CC: Left cholesteatoma    SUBJECTIVE:  Patient underwent a Left tympanoplasty, canal wall up tympanoplasty with 4.5 mm TORP OCR for left cholesteatoma.  No complaints and specifically denies vertigo, dizziness, otorrhea, or otalgia.    ROS:  No fevers    OBJECTIVE:    The patient is well-developed, well-nourished in no acute distress with a good ability to communicate without hearing aids.  He has normal facial strength and symmetry and is alert and oriented to time, person, and place with appropriate mood and affect.     Vitals:    07/12/24 0805   BP: 129/84   BP Location: Left arm   Patient Position: Sitting   BP Cuff Size: Adult long   Pulse: 106   Temp: 36.8 °C (98.3 °F)   TempSrc: Temporal   Weight: 101 kg (223 lb)   Height: 1.753 m (5' 9\")        Incision healing well.  Packing removed.  The tympanic membrane appears intact, though there is an area of inflammation where there may be a pinpoint perforation,though it appears fully intact..  Mirza exam lateralizes to the operated ear.    Operative findings:   - Deep retraction pocket of superior mesotympanum, cholesteatoma sac extended into mastoid antrum past the dome of the LSCC and just superficially into perilabyrinthine air cell tract. Complete resection achieved  - Incus eroded with no obvious remnant; stapes suprastructure absent. Footplate intact and mobile. Malleus head partially eroded, resected along with handle  - Small defect of tegmen tympani without CSF leak, remainder of tegmen intact  - Tympanic facial nerve dehiscent; mastoid facial nerve intact  - Mastoidectomy with large posterior atticotomy and facial recess  - OCR with Precise Centered TORP, measuring 4.5mm, from footplate.  - TM reconstructed with cartilage, perichondrium, periosteum.    ASSESSMENT and PLAN:  Left cholesteatoma s/p canal wall up tympanomastoidectomy with OCR.  Operative findings were discussed with the patient.  He willnot require a planned " second look operation.      1.  Continue Ciprodex 4 drops twice daily for two weeks  2.  Follow-up in 2-3 months with audiogram with Dr. Bautista  3.  I would recommend MRI with non-echoplanar diffusion weighted imaging in 1.5 - 2 years to evaluate for residual disease if a second look surgery is not pursued    Jamil Brizuela MD

## 2024-07-15 NOTE — H&P (VIEW-ONLY)
" Patient: Jamil Cisse    [id number]  : 1970 -- AGE 54 y.o.    Provider: LJ Broussard     Location [facility]   Service Date: 2024         Department of Medicine  Division of Pulmonary, Critical Care, and Sleep Medicine         [facility]  Follow Up Visit Note      Virtual or Telephone Consent  A telephone visit (audio only) between the patient (at the originating site) and the provider (at the distant site) was utilized to provide this telehealth service.   Verbal consent was requested and obtained from Jamil Cisse on this date, 24 for a telehealth visit.       HISTORY OF PRESENT ILLNESS     HISTORY OF PRESENT ILLNESS   Jamil Cisse \"Mau\" is a 54 y.o. male who presents to a [facility] for a follow up visit with concerns of pre-bronch. I have independently interviewed and examined the patient in the office and reviewed available records.    DATE OF LAST VISIT: 2024    Current History    On today's visit, the patient reports getting short of breath with exertion with elliptical, started at 18 min in now 6 min in, worse over the past year. Denies chest pain, GERD, cough, wheeze  SOB at rest. chest pain, allergies.  Night time - on CPAP wearing nightly      ---------------------------------------------------------  Previous visit  Physician HPI (2024):  A 54 y.o. male with a history of renal cell carcinoma in 2003 status post resection and nephrectomy, Ulcerative colitis on Cimzia (previously was on Humira), aortic aneurysm bicuspid aortic valve GERD, Gout, Hypertension, Mixed hyperlipidemia, Migraines, CKD stage III, WILFRID and Encephalitis (unknown cause), who is a former smoker (26 pack years),   He was referred by Trish Ruiz CNP for worsening shortness of breath.  He was diagnosed with ulcerative colitis 14 years ago by biopsy shows colitis,  diagnosis was reversed by " serological markers to combined ulcerative colitis and Crohn's disease he was initially maintained on infliximab and Humira which was not responsive against his colitis also was on steroids on and off.   Infliximab was switched to certolizumab (Cimzia) for which he is maintained on it for the last 6 years frequency of exacerbation of colitis has decreased,  currently his disease is under remission.     He is referred for pulmonary clinic for worsening shortness of breath for the last year and a half he was able to exercise daily for 20 minutes before his complaint of shortness of breath now after few minutes of exercise his shortness of breath and cannot complete his workup.  He has mild infrequent cough sometimes associated with scanty phlegm he denies any fever or chest pain or hemoptysis he has been investigated for respiratory tract infection including TB spot test that was negative.  He had history of COVID infection 3 times in 2020 2021 2022 all of COVID infections where minimal with no associated respiratory failure is also vaccinated against COVID he denies any chest wheezes he has some environmental allergy and is maintained on montelukast  He was maintained on prednisone from 2000 4/12/2014 on various doses his latest exacerbation was in 2016 requiring steroid and since then he was switched to Cimzia     He also has history of WILFRID severe WILFRID with AHI score of 91 he is maintained on CPAP for the last few years he is using CPAP daily more than 7 hours with no daytime symptoms  He has multiple CT chest reviewing of CT images in February 2024 shows mediastinal lymphadenopathy with left lower lobe infiltrate and bronchiectasis and right upper lobe consolidative mass.  He received short course of steroid in March 2024 repeated CT showed resolution of the right upper lobe infiltrate but persistent of the lower lobe consolidation he had a recent ear surgery.     PFT in June 2023 shows restrictive function repeated  "PFT in February 2024 shows preserved FEV1 FVC and lung volumes with no affection of DLCO        Past medical history:  Renal cell carcinoma 2003 status post nephrectomy  Ulcerative colitis and Crohn's disease   WILFRID  Bicuspid aortic valve  Aortic aneurysm  Hyperlipidemia  CKD stage III  hypertension      REVIEW OF SYSTEMS     REVIEW OF SYSTEMS  Review of Systems   Respiratory:  Positive for shortness of breath.    All other systems reviewed and are negative.        ALLERGIES AND MEDICATIONS     ALLERGIES  Allergies   Allergen Reactions    Adhesive Tape-Silicones Rash and Other     skin pulled off    Codeine Other     \"Skin got so hot\"    Sulfa (Sulfonamide Antibiotics) Other    Adhesive Other and Unknown     Pulls skin off    Amlodipine Besylate Rash     Flush    Lisinopril Other     ineffective    Sumatriptan Other     ineffective    Valsartan Other     ineffective       MEDICATIONS  Current Outpatient Medications   Medication Sig Dispense Refill    aspirin 81 mg EC tablet Take 1 tablet (81 mg) by mouth once daily. 90 tablet 0    aspirin-acetaminophen-caffeine (Excedrin Extra Strength) 250-250-65 mg tablet Take 1 tablet by mouth every 6 hours if needed.      atorvastatin (Lipitor) 20 mg tablet Take 1 tablet (20 mg) by mouth once daily. 90 tablet 0    certolizumab pegol (Cimzia Powder for Reconst) injection Inject 1 mL (200 mg) under the skin every 14 (fourteen) days.      cetirizine (ZyrTEC) 10 mg tablet Take by mouth once daily.      ciprofloxacin-dexamethasone (CiproDEX) otic suspension Instill 4 drops to affected ear twice daily 7.5 mL 0    clindamycin (Cleocin) 300 mg capsule TAKE 2 CAPSULES BY MOUTH ONE HOUR BEFORE APPOINTMENT      colchicine 0.6 mg tablet Take 1 tablet (0.6 mg) by mouth once daily. 90 tablet 0    febuxostat (Uloric) 40 mg tablet Take 1 tablet (40 mg) by mouth once daily. 90 tablet 0    fluticasone (Flonase) 50 mcg/actuation nasal spray Administer 2 sprays into each nostril once daily. 48 g 0 "    montelukast (Singulair) 10 mg tablet Take 1 tablet (10 mg) by mouth once daily at bedtime. 90 tablet 1    omeprazole (PriLOSEC) 40 mg DR capsule TAKE 1 CAPSULE(40 MG) BY MOUTH DAILY 90 capsule 0    telmisartan-hydrochlorothiazid (MIcarDIS HCT) 80-25 mg tablet Take 1 tablet by mouth once daily. 90 tablet 0    Xiidra 5 % dropperette INSTILL 1 DROP IN BOTH EYES TWICE A DAY      Maxalt-MLT 10 mg disintegrating tablet Take 1 tablet (10 mg) by mouth 1 time if needed for migraine. May repeat in 2 hours if unresolved. Do not exceed 30 mg in 24 hours. 30 tablet 0    predniSONE (Deltasone) 20 mg tablet Take 1.5 tablets (30 mg) by mouth once daily for 10 days, THEN 1 tablet (20 mg) once daily for 10 days, THEN 0.5 tablets (10 mg) once daily for 10 days. (Patient not taking: Reported on 7/25/2024) 30 tablet 0    traMADol (Ultram) 50 mg tablet Take 1 tablet (50 mg) by mouth every 6 hours if needed for severe pain (7 - 10). (Patient not taking: Reported on 7/12/2024) 16 tablet 0     No current facility-administered medications for this visit.         PAST HISTORY     PAST MEDICAL HISTORY  He  has a past medical history of Abnormal ECG (09/16/2022), Arthritis, Bicuspid aortic valve ([facility]), Cholesteatoma of middle ear and mastoid, CKD (chronic kidney disease), Dilated aortic root (CMS-HCC), Diverticulosis, GERD (gastroesophageal reflux disease), Gout, Heart murmur, History of PFTs (06/16/2023), HL (hearing loss), Hyperlipidemia, Hypertension, Lung nodules, Mild aortic valve regurgitation, Obesity, Personal history of infections of the central nervous system (02/2018), PONV (postoperative nausea and vomiting), Reactive airway disease ([facility]), Renal cancer (Multi) (2003), Renal mass, left, Shortness of breath, Sinusitis, Sleep apnea, Thoracic ascending aortic aneurysm (CMS-HCC) (02/21/2024), Ulcerative colitis (Multi), and Vision loss.      PAST SURGICAL HISTORY  Past Surgical History:   Procedure Laterality Date     COLONOSCOPY      CT CHEST WO IV CONTRAST  2024    Nonspecific 1.9 cm right upper lobe part solid nodule with mediastinal lymphadenopathy.Ascending thoracic aortic aneurysm, 4.3 cm.    HERNIA REPAIR      NEPHRECTOMY Right 2005    Right total nephrectomy    OTHER SURGICAL HISTORY      Left Shoulder surgery    OTHER SURGICAL HISTORY  2020    Ear surgery x3    OTHER SURGICAL HISTORY  2020    Finger surgical procedure    OTHER SURGICAL HISTORY  2020    Throat surgery    ROTATOR CUFF REPAIR Right 2022    Right rotator cuff repair with subacromial decompression    TOTAL HIP ARTHROPLASTY Bilateral 2017    Bilateral Hip replacement    US CAROTID DOPPLER LEFT  2018    NO HEMODYNAMICALLY SIGNIFICANT VASCULAR STENOSIS IN THE CAROTID AND VERTEBRAL ARTERIES IN THE NECK    VASECTOMY         IMMUNIZATION HISTORY  Immunization History   Administered Date(s) Administered    Flu vaccine (IIV4), preservative free *Check age/dose* 10/01/2018, 10/01/2019, 2020, 2022, 2023    Influenza, Unspecified 2010, 2022    Influenza, injectable, quadrivalent 09/15/2017    Influenza, seasonal, injectable 10/05/2021    Influenza, seasonal, injectable, preservative free 10/08/2016    PPD Test 2015    Pfizer Gray Cap SARS-CoV-2 2022    Pfizer Purple Cap SARS-CoV-2 2021, 2021    Zoster vaccine, recombinant, adult (SHINGRIX) 2023, 2023       SOCIAL HISTORY  Tobacco Smokin- 47 y/o- 1 pack/day - 26 pack years  Illicit drugs: none  Alcohol consumption: socially  Pets: no    OCCUPATIONAL/ENVIRONMENTAL HISTORY  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  Unknown exposure to asbestos, silica, beryllium or inhaled metals.    FAMILY HISTORY  Family History   Problem Relation Name Age of Onset    Hypertension Mother      Hypertension Father      Stroke Father       No family history of pulmonary disease.  No family history of cancer.  Mother and  "maternal grandparents -rheumatoid arthritis    PHYSICAL EXAM     VITAL SIGNS: Ht 1.753 m (5' 9\")   Wt 102 kg (225 lb)   BMI 33.23 kg/m²      PREVIOUS WEIGHTS:  Wt Readings from Last 3 Encounters:   07/25/24 102 kg (225 lb)   07/12/24 101 kg (223 lb)   07/09/24 101 kg (221 lb 12.8 oz)       Physical Exam  Constitutional:       Appearance: Normal appearance.   Pulmonary:      Effort: Pulmonary effort is normal.   Neurological:      General: No focal deficit present.      Mental Status: He is alert and oriented to person, place, and time.   Psychiatric:         Mood and Affect: Mood normal.         Behavior: Behavior normal.         Thought Content: Thought content normal.         Judgment: Judgment normal.           RESULTS/DATA     Pulmonary Function Test Results     Scheduled 7/24/24      Chest Radiograph     No testing done       Chest CT Scan     CT angio chest w and wo IV contrast 06/17/2024    Narrative  * * *Final Report* * *    DATE OF EXAM: Jun 17 2024  8:36AM    JQC   0125  -  CTA CHEST (GATED) W IVCON  / ACCESSION #  [id number]    PROCEDURE REASON: multiple diagnoses    * * * * Physician Interpretation * * * *    CTA Aorta chest  Direct Image Comparison: Outside noncontrast CT chest 02/20/2024    HISTORY: 54 year old Male with h/o suspected aortic disease  Evaluation for diagnostic clarification and further treatment options.  There is request to define thoracic and aortic anatomy    TECHNIQUE: SCANNER: Siemens Somatom Force Dual source 2x192-slice scanner  PROTOCOL: Prospectively triggered helical high-pitch acquisitions  (\"triggered Flash-mode\") was performed  following the intravenous  administration of contrast material.  Scan Range: thoracic inlet to the diaphragm  CT Dose-Length Product (DLP): 226 mGy*cm  CT Dose Reduction Employed: Automated exposure control(AEC) and iterative  recon  CONTRAST: IV administration of 80 ml Omnipaque 350  Scan acquisition: uncomplicated  Macro Version: [id number]:CCTW_[id number]  For " optimization of anatomic evaluation, advanced 3-D off-line  postprocessing was performed on a dedicated workstation by the  interpreting physician.  STUDY LIMITATIONS: None..    RESULT:  LINES, TUBES and DEVICES: None  CHEST: Chest wall anatomy: unremarkable. Small partially calcified nodule  in the right thyroid lobe.  LUNGS: Subpleural reticular opacities with superimposed groundglass  predominantly involving the left lower lobe.  Clinical correlation is  recommended.  Previously mentioned large part solid right upper lobe  nodule is no longer seen.  MEDIASTINUM: mediastinal and hilar lymphadenopathy. Clinical correlation  is recommended.  PERICARDIUM: unremarkable  CENTRAL PULMONARY ARTERY: normal dimensions. Assessment is limited due to  limited contrast enhancement.    CARDIAC CHAMBERS:  LEFT VENTRICLE: concentric hypertrophy.  RIGHT VENTRICLE: normal size  Left atrium: normal size.  IVETH: normal  Right atrium: normal size  CENTRAL VENOUS and PULMONARY VENOUS RETURN: normal.  Coronary Sinus: normal size    MITRAL VALVE: assessment is limited in the current study  - no leaflet calcification. No annular calcification  TRICUSPID and PULMONIC VALVE: appear unremarkable.    CORONARY ANATOMY: normal origin of the coronary arteries.  No definitive evidence of calcified atherosclerotic changes of the  coronary arteries.    AORTIC VALVE: appears bicuspid, with calcified raphe at the RCC/LCC.  Mild-moderate leaflet calcification.  Incomplete diastolic coaptation,  suggesting aortic insufficiency.  AORTA:  Aortic Size: Dilation aortic root and ascending aorta.  STJ: partially effaced. No calcification.  Wall Changes: no evidence of wall changes.  Arch Branch Vessels: Unremarkable proximal segments of the arch branch  vessels. Normal origin of the arch branch vessels. Mild partially  calcified wall changes.  AORTIC DIMENSIONS:  AORTIC ROOT: 4.2 cm measured sinus-to-sinus  mid ASCENDING THORACIC AORTA: 4.4 cm  mid AORTIC  ARCH: 3.0 cm  mid DESCENDING THORACIC AORTA: 2.5 cm    limited upper ABDOMEN: unremarkable  BONES and SOFT TISSUES: degenerative changes of the thoracic spine.   (topogram) images: No additional findings.    Impression  IMPRESSION:    1.  Dilation of the aortic root (4.2 cm), and ascending thoracic aorta  (4.4 cm).  The remainder of the thoracic aorta is normal in course,  contour, and caliber, without acute pathology.  Likely stable dimensions  as compared to outside CT chest without contrast 02/20/2024.    2.  Likely bicuspid aortic valve with calcified raphe at the RCC/LCC.  Small diastolic coaptation gap suggestive of aortic regurgitation.    3. Stable to slightly improved subpleural reticular opacities with   superimposed groundglass predominantly involving the left lower lobe.  Clinical correlation is recommended.  Previously mentioned large part  solid right upper lobe nodule is no longer seen.    3. There are prominent mediastinal and hilar lymph nodes, clinical  correlation is recommended.    4. Small thyroid nodule Incidental Finding:  No follow-up imaging  recommended for this small < 1.0cm incidentally detected thyroid  nodule(s) in a patient with no known thyroid disease.  American Thyroid Association 2009          Echocardiogram     ECHO 6/17/2024    Impression  [facility]formed by [facility]  CONCLUSIONS:  - Exam indication: Aortic Aneurysm, suspected BAV  - The left ventricle is normal in size. Left ventricular systolic function is  normal. EF = 58 ± 5% (2D biplane) Normal left ventricular diastolic function.  - The right ventricle is normal in size. Right ventricular systolic function is  normal.  - The visualized aorta is dilated with a maximal dimension of 4.3 cm.  - There is moderate (2+) aortic valve regurgitation. There is moderate aortic  valve stenosis caused by calcified valve and restricted opening. AV area is 1.21  cm² (0.54 cm²/m²) by continuity, VTI. The peak gradient is  29 mmHg, the mean  gradient is 18 mmHg and the dimensionless valve index is 0.35. AV morphology  difficult to assess on today's exam due to calcification.  - The patient has not had a prior CC echocardiographic exam for comparison.      Electronically signed by Ta Knowles MD on 2024 at 12:26:08 PM      * * * Final * * *  Narrative  Performed by [facility]        Echocardiography Report: Transthoracic Echo  Main [place] J1-5  Date of service: 2024 11:22:02 AM  Accession #: [id number]    Ordering physician: JASON FLORES  Indication: Aortic Aneurysm, suspected BAV    Technologist: Jocelyne Macias  Interpreting physician: Ta Knowles MD    PATIENT:  Name: MS. MARSHALL GUZMAN  MRN: [id number]  : 1970  Age: 54 years  Gender: M    History of hypertension and dyslipidemia.  Primary rhythm: sinus.  Height: 175.30 cm BSA: 2.26 m²  Weight: 105.00 kg BMI: 34.2 kg/m²      Heart rate     91 bpm  Blood pressure 147/109 mmHg    Color Doppler was utilized to interrogate the cardiac valves assessed and spectral   Doppler was utilized to determine the flow velocities and pressure gradients  reported in this exam.    MEASUREMENTS:                           Value               Indexed    Normal  Max aortic dimension     4.3 cm                         Ao < 3.8  Left atrial volume       39 ml (biplane A-L) 17 ml/m²   Dario <= 34  LV ID (diastole)         5.2 cm (2D)         2.28 cm/m²  LV ID (systole)          3.5 cm (2D)         1.53 cm/m²  IVS, leaflet tips        1.3 cm (2D)  Posterior wall thickness 0.9 cm (2D)  Left ventricular mass    217 g (2D)          96 g/m²  LV stroke volume         77 ml (2D biplane)  LVOT stroke volume       66 ml               30 ml/m²  LV end diastolic volume  132 ml (2D biplane) 58.3 ml/m² 34<=EDVi<75  LV end systolic volume   55 ml (2D biplane)  24.3 ml/m²  Ejection Fraction        58 % (2D biplane)              EF > 52      FINDINGS:    LEFT VENTRICLE  The left  ventricle is normal in size.  Left ventricular systolic function is normal.  Normal left ventricular diastolic function.  There is a single false tendon at the mid ventricle.  Mitral annular lateral E/e': 11.2. Mitral annular septal E/e': 11.2.  Wall Motion:  All scored segments are normal.        RIGHT VENTRICLE  The right ventricle is normal in size.  Right ventricular systolic function is normal. RV systolic tissue Doppler velocity   is 11.0 cm/s. Tricuspid annular displacement is 1.7 cm.  Estimated right ventricular systolic pressure is not reported due to an  insufficient tricuspid regurgitation signal. Estimated right atrial pressure is 3  mmHg based on IVC assessment.    LEFT ATRIUM  The left atrial cavity is normal in size.  Pulmonary Veins:  The pulmonary venous pattern showed normal systolic flow.    RIGHT ATRIUM  The right atrial cavity is normal in size.  Inferior Vena Cava:  The inferior vena cava appears normal measuring 1.9 cm. The vessel decreases   greater than 50 percent with inspiration.    MITRAL VALVE  There is trace mitral valve regurgitation. There is mild thickening. The peak  mitral E/A ratio is 0.63. The average mitral E/e' ratio is 11.2.    TRICUSPID VALVE  There is trace tricuspid valve regurgitation. There is no thickening. The hepatic  venous pattern showed normal systolic flow.    AORTIC VALVE  There is moderate aortic valve stenosis caused by calcified valve and restricted  opening. There is moderate (2+) aortic valve regurgitation. There is mild  thickening. There is mild calcification. The peak gradient is 29 mmHg (peak  velocity = 271.0 cm/s). The mean gradient is 18 mmHg. The LVOT diameter is 2.1 cm.   The aortic VTI is 54.2 cm. The dimensionless valve index is 0.35. AV area is 1.21   cm² (0.54 cm²/m²) by continuity, VTI. The LVOT stroke volume index is 30 ml/m².    PULMONIC VALVE  There is trace pulmonic valve regurgitation. There is no thickening.    AORTA  The visualized aorta  "is dilated.  Measurements - Sinus: 4.1 cm. Mid ascending aorta 4.3 cm. Distal ascending aorta  4.3 cm.    INTERATRIAL SEPTUM  There is no evidence of intracardiac shunting as detected by Doppler.    INTERVENTRICULAR SEPTUM  There is no flow through the interventricular septum as detected by Doppler.    PERICARDIUM  There is no pericardial effusion.  Specimen Collected: 06/17/24 11:22    [facility]formed by: [facility] Last Resulted: 06/17/24 12:26   Received From: [facility]  Result Received: 06/21/24 11:14           Labwork   Complete Blood Count  Lab Results   Component Value Date    WBC 6.3 07/09/2024    HGB 14.4 07/09/2024    HCT 46.6 07/09/2024    MCV 76 (L) 07/09/2024     07/09/2024       Peripheral Eosinophil Count/Percentage:   Eosinophils Absolute (x10*3/uL)   Date Value   07/09/2024 0.27     Eosinophils % (%)   Date Value   07/09/2024 4.3       Serum Immunoglobulin E:    No results found for: \"IGE\"       ASSESSMENT/PLAN     Mr. Cisse is a 54 y.o. male and  has a past medical history of Abnormal ECG (09/16/2022), Arthritis, Bicuspid aortic valve ([facility]), Cholesteatoma of middle ear and mastoid, CKD (chronic kidney disease), Dilated aortic root ([facility]), Diverticulosis, GERD (gastroesophageal reflux disease), Gout, Heart murmur, History of PFTs (06/16/2023), HL (hearing loss), Hyperlipidemia, Hypertension, Lung nodules, Mild aortic valve regurgitation, Obesity, Personal history of infections of the central nervous system (02/2018), PONV (postoperative nausea and vomiting), Reactive airway disease ([facility]), Renal cancer (Multi) (2003), Renal mass, left, Shortness of breath, Sinusitis, Sleep apnea, Thoracic ascending aortic aneurysm ([facility]) (02/21/2024), Ulcerative colitis (Multi), and Vision loss. He presents to the [facility] for follow up of dyspnea and mediastinal LAD.     Problem List and Orders      Assessment and Plan / " Recommendations:    Patient's visit was converted to a virtual visit.    1. {Mediastinal LAD  differential diagnoses include, infection versus neoplasm (lymphoma association between TNF alpha and lymphoma) versus sarcoid (less likely given the small size enlargement of the lymph node:   - Plan for bronchoscopy with biopsy   - Lab work prior to procedure - 7/9 hgb 14.4, plt 260, bicarb 28 mmol, creatinine 1.38  - Not on any anticoagulation - hold aspirin before the bronchoscopy    I explained the procedure to the patient. We discussed that the bronchoscopy will be performed by a member of the Interventional Pulmonary Team; depending on scheduling and provider availability. We also discussed that the IP providers function as a team and not infrequently may have to fill in for one another if there are emergent issues that need attention at the same time. The patient / family expressed understanding and agreed to proceed. All questions were answered.     Patient's visit was converted to a virtual visit. Spoke with the patient via phone for 13 minutes.    Prep: 10 minutes  Phone: 13 minutes  Total: 23 minutes    Thank you for visiting the Pulmonary clinic today!     Alee Bartholomew CNP  My office number is [phone] -     Best way to get a hold of me is to call my office --> Please do not send me follow my health messages  Any test results will be discussed at next visit -- please make sure to make a follow up appt after testing.

## 2024-07-15 NOTE — PROGRESS NOTES
" Patient: Jamil Cisse    [id number]  : 1970 -- AGE 54 y.o.    Provider: LJ Broussard     Location [facility]   Service Date: 2024         Department of Medicine  Division of Pulmonary, Critical Care, and Sleep Medicine         [facility]  Follow Up Visit Note      Virtual or Telephone Consent  A telephone visit (audio only) between the patient (at the originating site) and the provider (at the distant site) was utilized to provide this telehealth service.   Verbal consent was requested and obtained from Jamil Cisse on this date, 24 for a telehealth visit.       HISTORY OF PRESENT ILLNESS     HISTORY OF PRESENT ILLNESS   Jamil Cisse \"Mau\" is a 54 y.o. male who presents to a [facility] for a follow up visit with concerns of pre-bronch. I have independently interviewed and examined the patient in the office and reviewed available records.    DATE OF LAST VISIT: 2024    Current History    On today's visit, the patient reports getting short of breath with exertion with elliptical, started at 18 min in now 6 min in, worse over the past year. Denies chest pain, GERD, cough, wheeze  SOB at rest. chest pain, allergies.  Night time - on CPAP wearing nightly      ---------------------------------------------------------  Previous visit  Physician HPI (2024):  A 54 y.o. male with a history of renal cell carcinoma in 2003 status post resection and nephrectomy, Ulcerative colitis on Cimzia (previously was on Humira), aortic aneurysm bicuspid aortic valve GERD, Gout, Hypertension, Mixed hyperlipidemia, Migraines, CKD stage III, WILFRID and Encephalitis (unknown cause), who is a former smoker (26 pack years),   He was referred by Trish Ruiz CNP for worsening shortness of breath.  He was diagnosed with ulcerative colitis 14 years ago by biopsy shows colitis,  diagnosis was reversed by " serological markers to combined ulcerative colitis and Crohn's disease he was initially maintained on infliximab and Humira which was not responsive against his colitis also was on steroids on and off.   Infliximab was switched to certolizumab (Cimzia) for which he is maintained on it for the last 6 years frequency of exacerbation of colitis has decreased,  currently his disease is under remission.     He is referred for pulmonary clinic for worsening shortness of breath for the last year and a half he was able to exercise daily for 20 minutes before his complaint of shortness of breath now after few minutes of exercise his shortness of breath and cannot complete his workup.  He has mild infrequent cough sometimes associated with scanty phlegm he denies any fever or chest pain or hemoptysis he has been investigated for respiratory tract infection including TB spot test that was negative.  He had history of COVID infection 3 times in 2020 2021 2022 all of COVID infections where minimal with no associated respiratory failure is also vaccinated against COVID he denies any chest wheezes he has some environmental allergy and is maintained on montelukast  He was maintained on prednisone from 2000 4/12/2014 on various doses his latest exacerbation was in 2016 requiring steroid and since then he was switched to Cimzia     He also has history of WILFRID severe WILFRID with AHI score of 91 he is maintained on CPAP for the last few years he is using CPAP daily more than 7 hours with no daytime symptoms  He has multiple CT chest reviewing of CT images in February 2024 shows mediastinal lymphadenopathy with left lower lobe infiltrate and bronchiectasis and right upper lobe consolidative mass.  He received short course of steroid in March 2024 repeated CT showed resolution of the right upper lobe infiltrate but persistent of the lower lobe consolidation he had a recent ear surgery.     PFT in June 2023 shows restrictive function repeated  "PFT in February 2024 shows preserved FEV1 FVC and lung volumes with no affection of DLCO        Past medical history:  Renal cell carcinoma 2003 status post nephrectomy  Ulcerative colitis and Crohn's disease   WILFRID  Bicuspid aortic valve  Aortic aneurysm  Hyperlipidemia  CKD stage III  hypertension      REVIEW OF SYSTEMS     REVIEW OF SYSTEMS  Review of Systems   Respiratory:  Positive for shortness of breath.    All other systems reviewed and are negative.        ALLERGIES AND MEDICATIONS     ALLERGIES  Allergies   Allergen Reactions    Adhesive Tape-Silicones Rash and Other     skin pulled off    Codeine Other     \"Skin got so hot\"    Sulfa (Sulfonamide Antibiotics) Other    Adhesive Other and Unknown     Pulls skin off    Amlodipine Besylate Rash     Flush    Lisinopril Other     ineffective    Sumatriptan Other     ineffective    Valsartan Other     ineffective       MEDICATIONS  Current Outpatient Medications   Medication Sig Dispense Refill    aspirin 81 mg EC tablet Take 1 tablet (81 mg) by mouth once daily. 90 tablet 0    aspirin-acetaminophen-caffeine (Excedrin Extra Strength) 250-250-65 mg tablet Take 1 tablet by mouth every 6 hours if needed.      atorvastatin (Lipitor) 20 mg tablet Take 1 tablet (20 mg) by mouth once daily. 90 tablet 0    certolizumab pegol (Cimzia Powder for Reconst) injection Inject 1 mL (200 mg) under the skin every 14 (fourteen) days.      cetirizine (ZyrTEC) 10 mg tablet Take by mouth once daily.      ciprofloxacin-dexamethasone (CiproDEX) otic suspension Instill 4 drops to affected ear twice daily 7.5 mL 0    clindamycin (Cleocin) 300 mg capsule TAKE 2 CAPSULES BY MOUTH ONE HOUR BEFORE APPOINTMENT      colchicine 0.6 mg tablet Take 1 tablet (0.6 mg) by mouth once daily. 90 tablet 0    febuxostat (Uloric) 40 mg tablet Take 1 tablet (40 mg) by mouth once daily. 90 tablet 0    fluticasone (Flonase) 50 mcg/actuation nasal spray Administer 2 sprays into each nostril once daily. 48 g 0 "    montelukast (Singulair) 10 mg tablet Take 1 tablet (10 mg) by mouth once daily at bedtime. 90 tablet 1    omeprazole (PriLOSEC) 40 mg DR capsule TAKE 1 CAPSULE(40 MG) BY MOUTH DAILY 90 capsule 0    telmisartan-hydrochlorothiazid (MIcarDIS HCT) 80-25 mg tablet Take 1 tablet by mouth once daily. 90 tablet 0    Xiidra 5 % dropperette INSTILL 1 DROP IN BOTH EYES TWICE A DAY      Maxalt-MLT 10 mg disintegrating tablet Take 1 tablet (10 mg) by mouth 1 time if needed for migraine. May repeat in 2 hours if unresolved. Do not exceed 30 mg in 24 hours. 30 tablet 0    predniSONE (Deltasone) 20 mg tablet Take 1.5 tablets (30 mg) by mouth once daily for 10 days, THEN 1 tablet (20 mg) once daily for 10 days, THEN 0.5 tablets (10 mg) once daily for 10 days. (Patient not taking: Reported on 7/25/2024) 30 tablet 0    traMADol (Ultram) 50 mg tablet Take 1 tablet (50 mg) by mouth every 6 hours if needed for severe pain (7 - 10). (Patient not taking: Reported on 7/12/2024) 16 tablet 0     No current facility-administered medications for this visit.         PAST HISTORY     PAST MEDICAL HISTORY  He  has a past medical history of Abnormal ECG (09/16/2022), Arthritis, Bicuspid aortic valve ([facility]), Cholesteatoma of middle ear and mastoid, CKD (chronic kidney disease), Dilated aortic root (CMS-HCC), Diverticulosis, GERD (gastroesophageal reflux disease), Gout, Heart murmur, History of PFTs (06/16/2023), HL (hearing loss), Hyperlipidemia, Hypertension, Lung nodules, Mild aortic valve regurgitation, Obesity, Personal history of infections of the central nervous system (02/2018), PONV (postoperative nausea and vomiting), Reactive airway disease ([facility]), Renal cancer (Multi) (2003), Renal mass, left, Shortness of breath, Sinusitis, Sleep apnea, Thoracic ascending aortic aneurysm (CMS-HCC) (02/21/2024), Ulcerative colitis (Multi), and Vision loss.      PAST SURGICAL HISTORY  Past Surgical History:   Procedure Laterality Date     COLONOSCOPY      CT CHEST WO IV CONTRAST  2024    Nonspecific 1.9 cm right upper lobe part solid nodule with mediastinal lymphadenopathy.Ascending thoracic aortic aneurysm, 4.3 cm.    HERNIA REPAIR      NEPHRECTOMY Right 2005    Right total nephrectomy    OTHER SURGICAL HISTORY      Left Shoulder surgery    OTHER SURGICAL HISTORY  2020    Ear surgery x3    OTHER SURGICAL HISTORY  2020    Finger surgical procedure    OTHER SURGICAL HISTORY  2020    Throat surgery    ROTATOR CUFF REPAIR Right 2022    Right rotator cuff repair with subacromial decompression    TOTAL HIP ARTHROPLASTY Bilateral 2017    Bilateral Hip replacement    US CAROTID DOPPLER LEFT  2018    NO HEMODYNAMICALLY SIGNIFICANT VASCULAR STENOSIS IN THE CAROTID AND VERTEBRAL ARTERIES IN THE NECK    VASECTOMY         IMMUNIZATION HISTORY  Immunization History   Administered Date(s) Administered    Flu vaccine (IIV4), preservative free *Check age/dose* 10/01/2018, 10/01/2019, 2020, 2022, 2023    Influenza, Unspecified 2010, 2022    Influenza, injectable, quadrivalent 09/15/2017    Influenza, seasonal, injectable 10/05/2021    Influenza, seasonal, injectable, preservative free 10/08/2016    PPD Test 2015    Pfizer Gray Cap SARS-CoV-2 2022    Pfizer Purple Cap SARS-CoV-2 2021, 2021    Zoster vaccine, recombinant, adult (SHINGRIX) 2023, 2023       SOCIAL HISTORY  Tobacco Smokin- 47 y/o- 1 pack/day - 26 pack years  Illicit drugs: none  Alcohol consumption: socially  Pets: no    OCCUPATIONAL/ENVIRONMENTAL HISTORY  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  Unknown exposure to asbestos, silica, beryllium or inhaled metals.    FAMILY HISTORY  Family History   Problem Relation Name Age of Onset    Hypertension Mother      Hypertension Father      Stroke Father       No family history of pulmonary disease.  No family history of cancer.  Mother and  "maternal grandparents -rheumatoid arthritis    PHYSICAL EXAM     VITAL SIGNS: Ht 1.753 m (5' 9\")   Wt 102 kg (225 lb)   BMI 33.23 kg/m²      PREVIOUS WEIGHTS:  Wt Readings from Last 3 Encounters:   07/25/24 102 kg (225 lb)   07/12/24 101 kg (223 lb)   07/09/24 101 kg (221 lb 12.8 oz)       Physical Exam  Constitutional:       Appearance: Normal appearance.   Pulmonary:      Effort: Pulmonary effort is normal.   Neurological:      General: No focal deficit present.      Mental Status: He is alert and oriented to person, place, and time.   Psychiatric:         Mood and Affect: Mood normal.         Behavior: Behavior normal.         Thought Content: Thought content normal.         Judgment: Judgment normal.           RESULTS/DATA     Pulmonary Function Test Results     Scheduled 7/24/24      Chest Radiograph     No testing done       Chest CT Scan     CT angio chest w and wo IV contrast 06/17/2024    Narrative  * * *Final Report* * *    DATE OF EXAM: Jun 17 2024  8:36AM    JQC   0125  -  CTA CHEST (GATED) W IVCON  / ACCESSION #  [id number]    PROCEDURE REASON: multiple diagnoses    * * * * Physician Interpretation * * * *    CTA Aorta chest  Direct Image Comparison: Outside noncontrast CT chest 02/20/2024    HISTORY: 54 year old Male with h/o suspected aortic disease  Evaluation for diagnostic clarification and further treatment options.  There is request to define thoracic and aortic anatomy    TECHNIQUE: SCANNER: Siemens Somatom Force Dual source 2x192-slice scanner  PROTOCOL: Prospectively triggered helical high-pitch acquisitions  (\"triggered Flash-mode\") was performed  following the intravenous  administration of contrast material.  Scan Range: thoracic inlet to the diaphragm  CT Dose-Length Product (DLP): 226 mGy*cm  CT Dose Reduction Employed: Automated exposure control(AEC) and iterative  recon  CONTRAST: IV administration of 80 ml Omnipaque 350  Scan acquisition: uncomplicated  Macro Version: [id number]:CCTW_[id number]  For " optimization of anatomic evaluation, advanced 3-D off-line  postprocessing was performed on a dedicated workstation by the  interpreting physician.  STUDY LIMITATIONS: None..    RESULT:  LINES, TUBES and DEVICES: None  CHEST: Chest wall anatomy: unremarkable. Small partially calcified nodule  in the right thyroid lobe.  LUNGS: Subpleural reticular opacities with superimposed groundglass  predominantly involving the left lower lobe.  Clinical correlation is  recommended.  Previously mentioned large part solid right upper lobe  nodule is no longer seen.  MEDIASTINUM: mediastinal and hilar lymphadenopathy. Clinical correlation  is recommended.  PERICARDIUM: unremarkable  CENTRAL PULMONARY ARTERY: normal dimensions. Assessment is limited due to  limited contrast enhancement.    CARDIAC CHAMBERS:  LEFT VENTRICLE: concentric hypertrophy.  RIGHT VENTRICLE: normal size  Left atrium: normal size.  IVETH: normal  Right atrium: normal size  CENTRAL VENOUS and PULMONARY VENOUS RETURN: normal.  Coronary Sinus: normal size    MITRAL VALVE: assessment is limited in the current study  - no leaflet calcification. No annular calcification  TRICUSPID and PULMONIC VALVE: appear unremarkable.    CORONARY ANATOMY: normal origin of the coronary arteries.  No definitive evidence of calcified atherosclerotic changes of the  coronary arteries.    AORTIC VALVE: appears bicuspid, with calcified raphe at the RCC/LCC.  Mild-moderate leaflet calcification.  Incomplete diastolic coaptation,  suggesting aortic insufficiency.  AORTA:  Aortic Size: Dilation aortic root and ascending aorta.  STJ: partially effaced. No calcification.  Wall Changes: no evidence of wall changes.  Arch Branch Vessels: Unremarkable proximal segments of the arch branch  vessels. Normal origin of the arch branch vessels. Mild partially  calcified wall changes.  AORTIC DIMENSIONS:  AORTIC ROOT: 4.2 cm measured sinus-to-sinus  mid ASCENDING THORACIC AORTA: 4.4 cm  mid AORTIC  ARCH: 3.0 cm  mid DESCENDING THORACIC AORTA: 2.5 cm    limited upper ABDOMEN: unremarkable  BONES and SOFT TISSUES: degenerative changes of the thoracic spine.   (topogram) images: No additional findings.    Impression  IMPRESSION:    1.  Dilation of the aortic root (4.2 cm), and ascending thoracic aorta  (4.4 cm).  The remainder of the thoracic aorta is normal in course,  contour, and caliber, without acute pathology.  Likely stable dimensions  as compared to outside CT chest without contrast 02/20/2024.    2.  Likely bicuspid aortic valve with calcified raphe at the RCC/LCC.  Small diastolic coaptation gap suggestive of aortic regurgitation.    3. Stable to slightly improved subpleural reticular opacities with   superimposed groundglass predominantly involving the left lower lobe.  Clinical correlation is recommended.  Previously mentioned large part  solid right upper lobe nodule is no longer seen.    3. There are prominent mediastinal and hilar lymph nodes, clinical  correlation is recommended.    4. Small thyroid nodule Incidental Finding:  No follow-up imaging  recommended for this small < 1.0cm incidentally detected thyroid  nodule(s) in a patient with no known thyroid disease.  American Thyroid Association 2009          Echocardiogram     ECHO 6/17/2024    Impression  Performed by [facility]  CONCLUSIONS:  - Exam indication: Aortic Aneurysm, suspected BAV  - The left ventricle is normal in size. Left ventricular systolic function is  normal. EF = 58 ± 5% (2D biplane) Normal left ventricular diastolic function.  - The right ventricle is normal in size. Right ventricular systolic function is  normal.  - The visualized aorta is dilated with a maximal dimension of 4.3 cm.  - There is moderate (2+) aortic valve regurgitation. There is moderate aortic  valve stenosis caused by calcified valve and restricted opening. AV area is 1.21  cm² (0.54 cm²/m²) by continuity, VTI. The peak gradient is  29 mmHg, the mean  gradient is 18 mmHg and the dimensionless valve index is 0.35. AV morphology  difficult to assess on today's exam due to calcification.  - The patient has not had a prior CC echocardiographic exam for comparison.      Electronically signed by Ta Knowles MD on 2024 at 12:26:08 PM      * * * Final * * *  Narrative  Performed by [facility]        Echocardiography Report: Transthoracic Echo  [facility] [place] [id number]  Date of service: 2024 11:22:02 AM  Accession #: [id number]    Ordering physician: JASON FLORES  Indication: Aortic Aneurysm, suspected BAV    Technologist: Jocelyne Macias  Interpreting physician: Ta Knowles MD    PATIENT:  Name: MS. MARSHALL GUZMAN  MRN: [id number]  : 1970  Age: 54 years  Gender: M    History of hypertension and dyslipidemia.  Primary rhythm: sinus.  Height: 175.30 cm BSA: 2.26 m²  Weight: 105.00 kg BMI: 34.2 kg/m²      Heart rate     91 bpm  Blood pressure 147/109 mmHg    Color Doppler was utilized to interrogate the cardiac valves assessed and spectral   Doppler was utilized to determine the flow velocities and pressure gradients  reported in this exam.    MEASUREMENTS:                           Value               Indexed    Normal  Max aortic dimension     4.3 cm                         Ao < 3.8  Left atrial volume       39 ml (biplane A-L) 17 ml/m²   Dario <= 34  LV ID (diastole)         5.2 cm (2D)         2.28 cm/m²  LV ID (systole)          3.5 cm (2D)         1.53 cm/m²  IVS, leaflet tips        1.3 cm (2D)  Posterior wall thickness 0.9 cm (2D)  Left ventricular mass    217 g (2D)          96 g/m²  LV stroke volume         77 ml (2D biplane)  LVOT stroke volume       66 ml               30 ml/m²  LV end diastolic volume  132 ml (2D biplane) 58.3 ml/m² 34<=EDVi<75  LV end systolic volume   55 ml (2D biplane)  24.3 ml/m²  Ejection Fraction        58 % (2D biplane)              EF > 52      FINDINGS:    LEFT VENTRICLE  The left  ventricle is normal in size.  Left ventricular systolic function is normal.  Normal left ventricular diastolic function.  There is a single false tendon at the mid ventricle.  Mitral annular lateral E/e': 11.2. Mitral annular septal E/e': 11.2.  Wall Motion:  All scored segments are normal.        RIGHT VENTRICLE  The right ventricle is normal in size.  Right ventricular systolic function is normal. RV systolic tissue Doppler velocity   is 11.0 cm/s. Tricuspid annular displacement is 1.7 cm.  Estimated right ventricular systolic pressure is not reported due to an  insufficient tricuspid regurgitation signal. Estimated right atrial pressure is 3  mmHg based on IVC assessment.    LEFT ATRIUM  The left atrial cavity is normal in size.  Pulmonary Veins:  The pulmonary venous pattern showed normal systolic flow.    RIGHT ATRIUM  The right atrial cavity is normal in size.  Inferior Vena Cava:  The inferior vena cava appears normal measuring 1.9 cm. The vessel decreases   greater than 50 percent with inspiration.    MITRAL VALVE  There is trace mitral valve regurgitation. There is mild thickening. The peak  mitral E/A ratio is 0.63. The average mitral E/e' ratio is 11.2.    TRICUSPID VALVE  There is trace tricuspid valve regurgitation. There is no thickening. The hepatic  venous pattern showed normal systolic flow.    AORTIC VALVE  There is moderate aortic valve stenosis caused by calcified valve and restricted  opening. There is moderate (2+) aortic valve regurgitation. There is mild  thickening. There is mild calcification. The peak gradient is 29 mmHg (peak  velocity = 271.0 cm/s). The mean gradient is 18 mmHg. The LVOT diameter is 2.1 cm.   The aortic VTI is 54.2 cm. The dimensionless valve index is 0.35. AV area is 1.21   cm² (0.54 cm²/m²) by continuity, VTI. The LVOT stroke volume index is 30 ml/m².    PULMONIC VALVE  There is trace pulmonic valve regurgitation. There is no thickening.    AORTA  The visualized aorta  "is dilated.  Measurements - Sinus: 4.1 cm. Mid ascending aorta 4.3 cm. Distal ascending aorta  4.3 cm.    INTERATRIAL SEPTUM  There is no evidence of intracardiac shunting as detected by Doppler.    INTERVENTRICULAR SEPTUM  There is no flow through the interventricular septum as detected by Doppler.    PERICARDIUM  There is no pericardial effusion.  Specimen Collected: 06/17/24 11:22    [facility]formed by: [facility] Last Resulted: 06/17/24 12:26   Received From: [facility]  Result Received: 06/21/24 11:14           Labwork   Complete Blood Count  Lab Results   Component Value Date    WBC 6.3 07/09/2024    HGB 14.4 07/09/2024    HCT 46.6 07/09/2024    MCV 76 (L) 07/09/2024     07/09/2024       Peripheral Eosinophil Count/Percentage:   Eosinophils Absolute (x10*3/uL)   Date Value   07/09/2024 0.27     Eosinophils % (%)   Date Value   07/09/2024 4.3       Serum Immunoglobulin E:    No results found for: \"IGE\"       ASSESSMENT/PLAN     Mr. Cisse is a 54 y.o. male and  has a past medical history of Abnormal ECG (09/16/2022), Arthritis, Bicuspid aortic valve ([facility]), Cholesteatoma of middle ear and mastoid, CKD (chronic kidney disease), Dilated aortic root ([facility]), Diverticulosis, GERD (gastroesophageal reflux disease), Gout, Heart murmur, History of PFTs (06/16/2023), HL (hearing loss), Hyperlipidemia, Hypertension, Lung nodules, Mild aortic valve regurgitation, Obesity, Personal history of infections of the central nervous system (02/2018), PONV (postoperative nausea and vomiting), Reactive airway disease ([facility]), Renal cancer (Multi) (2003), Renal mass, left, Shortness of breath, Sinusitis, Sleep apnea, Thoracic ascending aortic aneurysm (CMS-[facility]) (02/21/2024), Ulcerative colitis (Multi), and Vision loss. He presents to the [facility] for follow up of dyspnea and mediastinal LAD.     Problem List and Orders      Assessment and Plan / " Recommendations:    Patient's visit was converted to a virtual visit.    1. {Mediastinal LAD  differential diagnoses include, infection versus neoplasm (lymphoma association between TNF alpha and lymphoma) versus sarcoid (less likely given the small size enlargement of the lymph node:   - Plan for bronchoscopy with biopsy   - Lab work prior to procedure - 7/9 hgb 14.4, plt 260, bicarb 28 mmol, creatinine 1.38  - Not on any anticoagulation - hold aspirin before the bronchoscopy    I explained the procedure to the patient. We discussed that the bronchoscopy will be performed by a member of the Interventional Pulmonary Team; depending on scheduling and provider availability. We also discussed that the IP providers function as a team and not infrequently may have to fill in for one another if there are emergent issues that need attention at the same time. The patient / family expressed understanding and agreed to proceed. All questions were answered.     Patient's visit was converted to a virtual visit. Spoke with the patient via phone for 13 minutes.    Prep: 10 minutes  Phone: 13 minutes  Total: 23 minutes    Thank you for visiting the Pulmonary clinic today!     Alee Bartholomew CNP  My office number is [phone] -     Best way to get a hold of me is to call my office --> Please do not send me follow my health messages  Any test results will be discussed at next visit -- please make sure to make a follow up appt after testing.

## 2024-07-23 LAB
LABORATORY COMMENT REPORT: NORMAL
PATH REPORT.FINAL DX SPEC: NORMAL
PATH REPORT.GROSS SPEC: NORMAL
PATH REPORT.RELEVANT HX SPEC: NORMAL
PATH REPORT.TOTAL CANCER: NORMAL

## 2024-07-24 ENCOUNTER — HOSPITAL ENCOUNTER (OUTPATIENT)
Dept: RESPIRATORY THERAPY | Facility: HOSPITAL | Age: 54
Discharge: HOME | End: 2024-07-24
Payer: COMMERCIAL

## 2024-07-24 ENCOUNTER — LAB (OUTPATIENT)
Dept: LAB | Facility: LAB | Age: 54
End: 2024-07-24
Payer: COMMERCIAL

## 2024-07-24 ENCOUNTER — APPOINTMENT (OUTPATIENT)
Dept: RESPIRATORY THERAPY | Facility: HOSPITAL | Age: 54
End: 2024-07-24
Payer: COMMERCIAL

## 2024-07-24 DIAGNOSIS — J84.9 ILD (INTERSTITIAL LUNG DISEASE) (MULTI): ICD-10-CM

## 2024-07-24 DIAGNOSIS — J84.9 INTERSTITIAL LUNG DISEASE (MULTI): ICD-10-CM

## 2024-07-24 LAB
MGC ASCENT PFT - FEV1 - POST: 3.47
MGC ASCENT PFT - FEV1 - POST: 3.47
MGC ASCENT PFT - FEV1 - PRE: 3.44
MGC ASCENT PFT - FEV1 - PRE: 3.44
MGC ASCENT PFT - FEV1 - PREDICTED: 3.45
MGC ASCENT PFT - FEV1 - PREDICTED: 3.45
MGC ASCENT PFT - FVC - POST: 4.25
MGC ASCENT PFT - FVC - POST: 4.25
MGC ASCENT PFT - FVC - PRE: 4.2
MGC ASCENT PFT - FVC - PRE: 4.2
MGC ASCENT PFT - FVC - PREDICTED: 4.35
MGC ASCENT PFT - FVC - PREDICTED: 4.35

## 2024-07-24 PROCEDURE — 94726 PLETHYSMOGRAPHY LUNG VOLUMES: CPT

## 2024-07-24 PROCEDURE — 83993 ASSAY FOR CALPROTECTIN FECAL: CPT

## 2024-07-24 PROCEDURE — 94618 PULMONARY STRESS TESTING: CPT

## 2024-07-25 ENCOUNTER — APPOINTMENT (OUTPATIENT)
Dept: PULMONOLOGY | Facility: CLINIC | Age: 54
End: 2024-07-25
Payer: COMMERCIAL

## 2024-07-25 VITALS — WEIGHT: 225 LBS | BODY MASS INDEX: 33.33 KG/M2 | HEIGHT: 69 IN

## 2024-07-25 DIAGNOSIS — R59.0 MEDIASTINAL LYMPHADENOPATHY: Primary | ICD-10-CM

## 2024-07-25 DIAGNOSIS — Z01.811 PREOP PULMONARY/RESPIRATORY EXAM: ICD-10-CM

## 2024-07-25 DIAGNOSIS — J84.9 ILD (INTERSTITIAL LUNG DISEASE) (MULTI): ICD-10-CM

## 2024-07-25 DIAGNOSIS — R91.1 LUNG NODULE: ICD-10-CM

## 2024-07-25 PROCEDURE — 1036F TOBACCO NON-USER: CPT | Performed by: NURSE PRACTITIONER

## 2024-07-25 PROCEDURE — 3008F BODY MASS INDEX DOCD: CPT | Performed by: NURSE PRACTITIONER

## 2024-07-25 PROCEDURE — 99213 OFFICE O/P EST LOW 20 MIN: CPT | Performed by: NURSE PRACTITIONER

## 2024-07-25 ASSESSMENT — ENCOUNTER SYMPTOMS
DEPRESSION: 0
LOSS OF SENSATION IN FEET: 0
OCCASIONAL FEELINGS OF UNSTEADINESS: 0
SHORTNESS OF BREATH: 1

## 2024-07-25 ASSESSMENT — PAIN SCALES - GENERAL: PAINLEVEL: 0-NO PAIN

## 2024-07-26 ENCOUNTER — APPOINTMENT (OUTPATIENT)
Dept: ALLERGY | Facility: CLINIC | Age: 54
End: 2024-07-26
Payer: COMMERCIAL

## 2024-07-26 ENCOUNTER — HOSPITAL ENCOUNTER (OUTPATIENT)
Dept: RADIOLOGY | Facility: CLINIC | Age: 54
Discharge: HOME | End: 2024-07-26
Payer: COMMERCIAL

## 2024-07-26 DIAGNOSIS — J84.9 ILD (INTERSTITIAL LUNG DISEASE) (MULTI): ICD-10-CM

## 2024-07-26 DIAGNOSIS — J30.2 SEASONAL ALLERGIES: ICD-10-CM

## 2024-07-26 PROCEDURE — 95117 IMMUNOTHERAPY INJECTIONS: CPT | Performed by: ALLERGY & IMMUNOLOGY

## 2024-07-26 PROCEDURE — 71250 CT THORAX DX C-: CPT | Performed by: STUDENT IN AN ORGANIZED HEALTH CARE EDUCATION/TRAINING PROGRAM

## 2024-07-26 PROCEDURE — 71250 CT THORAX DX C-: CPT

## 2024-07-28 ENCOUNTER — ANESTHESIA EVENT (OUTPATIENT)
Dept: GASTROENTEROLOGY | Facility: HOSPITAL | Age: 54
End: 2024-07-28
Payer: COMMERCIAL

## 2024-07-28 LAB — CALPROTECTIN STL-MCNT: 93 UG/G

## 2024-07-29 ENCOUNTER — HOSPITAL ENCOUNTER (OUTPATIENT)
Dept: GASTROENTEROLOGY | Facility: HOSPITAL | Age: 54
Setting detail: OUTPATIENT SURGERY
Discharge: HOME | End: 2024-07-29
Payer: COMMERCIAL

## 2024-07-29 ENCOUNTER — ANESTHESIA (OUTPATIENT)
Dept: GASTROENTEROLOGY | Facility: HOSPITAL | Age: 54
End: 2024-07-29
Payer: COMMERCIAL

## 2024-07-29 VITALS
HEIGHT: 69 IN | HEART RATE: 63 BPM | DIASTOLIC BLOOD PRESSURE: 78 MMHG | WEIGHT: 223 LBS | OXYGEN SATURATION: 96 % | SYSTOLIC BLOOD PRESSURE: 120 MMHG | TEMPERATURE: 96.6 F | RESPIRATION RATE: 20 BRPM | BODY MASS INDEX: 33.03 KG/M2

## 2024-07-29 DIAGNOSIS — R93.89 ABNORMAL CT OF THE CHEST: ICD-10-CM

## 2024-07-29 PROBLEM — Z98.890 PONV (POSTOPERATIVE NAUSEA AND VOMITING): Status: ACTIVE | Noted: 2024-07-29

## 2024-07-29 PROBLEM — R11.2 PONV (POSTOPERATIVE NAUSEA AND VOMITING): Status: ACTIVE | Noted: 2024-07-29

## 2024-07-29 LAB
BASOPHILS NFR FLD MANUAL: 0 %
BLASTS NFR FLD MANUAL: 0 %
CLARITY FLD: ABNORMAL
COLOR FLD: COLORLESS
EOSINOPHIL NFR FLD MANUAL: 1 %
IMMATURE GRANULOCYTES IN FLUID: 0 %
LYMPHOCYTES NFR FLD MANUAL: 86 %
MONOS+MACROS NFR FLD MANUAL: 13 %
NEUTROPHILS NFR FLD MANUAL: 0 %
OTHER CELLS NFR FLD MANUAL: 0 %
PLASMA CELLS NFR FLD MANUAL: 0 %
RBC # FLD AUTO: 1000 /UL
TOTAL CELLS COUNTED FLD: 100
WBC # FLD AUTO: 507 /UL

## 2024-07-29 PROCEDURE — 7100000010 HC PHASE TWO TIME - EACH INCREMENTAL 1 MINUTE

## 2024-07-29 PROCEDURE — 87116 MYCOBACTERIA CULTURE: CPT | Performed by: STUDENT IN AN ORGANIZED HEALTH CARE EDUCATION/TRAINING PROGRAM

## 2024-07-29 PROCEDURE — 87070 CULTURE OTHR SPECIMN AEROBIC: CPT | Performed by: STUDENT IN AN ORGANIZED HEALTH CARE EDUCATION/TRAINING PROGRAM

## 2024-07-29 PROCEDURE — 31652 BRONCH EBUS SAMPLNG 1/2 NODE: CPT | Performed by: STUDENT IN AN ORGANIZED HEALTH CARE EDUCATION/TRAINING PROGRAM

## 2024-07-29 PROCEDURE — 87799 DETECT AGENT NOS DNA QUANT: CPT | Performed by: STUDENT IN AN ORGANIZED HEALTH CARE EDUCATION/TRAINING PROGRAM

## 2024-07-29 PROCEDURE — 2720000007 HC OR 272 NO HCPCS

## 2024-07-29 PROCEDURE — 3700000002 HC GENERAL ANESTHESIA TIME - EACH INCREMENTAL 1 MINUTE

## 2024-07-29 PROCEDURE — 87486 CHLMYD PNEUM DNA AMP PROBE: CPT | Performed by: STUDENT IN AN ORGANIZED HEALTH CARE EDUCATION/TRAINING PROGRAM

## 2024-07-29 PROCEDURE — 87533 HHV-6 DNA QUANT: CPT | Performed by: STUDENT IN AN ORGANIZED HEALTH CARE EDUCATION/TRAINING PROGRAM

## 2024-07-29 PROCEDURE — 89051 BODY FLUID CELL COUNT: CPT | Performed by: STUDENT IN AN ORGANIZED HEALTH CARE EDUCATION/TRAINING PROGRAM

## 2024-07-29 PROCEDURE — 2500000004 HC RX 250 GENERAL PHARMACY W/ HCPCS (ALT 636 FOR OP/ED)

## 2024-07-29 PROCEDURE — 87305 ASPERGILLUS AG IA: CPT | Performed by: STUDENT IN AN ORGANIZED HEALTH CARE EDUCATION/TRAINING PROGRAM

## 2024-07-29 PROCEDURE — A31628 PR BRONCHOSCOPY,TRANSBRONCH BIOPSY: Performed by: ANESTHESIOLOGY

## 2024-07-29 PROCEDURE — 87205 SMEAR GRAM STAIN: CPT | Performed by: STUDENT IN AN ORGANIZED HEALTH CARE EDUCATION/TRAINING PROGRAM

## 2024-07-29 PROCEDURE — 87015 SPECIMEN INFECT AGNT CONCNTJ: CPT | Performed by: STUDENT IN AN ORGANIZED HEALTH CARE EDUCATION/TRAINING PROGRAM

## 2024-07-29 PROCEDURE — 31628 BRONCHOSCOPY/LUNG BX EACH: CPT | Performed by: STUDENT IN AN ORGANIZED HEALTH CARE EDUCATION/TRAINING PROGRAM

## 2024-07-29 PROCEDURE — 7100000001 HC RECOVERY ROOM TIME - INITIAL BASE CHARGE

## 2024-07-29 PROCEDURE — 7100000002 HC RECOVERY ROOM TIME - EACH INCREMENTAL 1 MINUTE

## 2024-07-29 PROCEDURE — 3700000001 HC GENERAL ANESTHESIA TIME - INITIAL BASE CHARGE

## 2024-07-29 PROCEDURE — 87449 NOS EACH ORGANISM AG IA: CPT | Performed by: STUDENT IN AN ORGANIZED HEALTH CARE EDUCATION/TRAINING PROGRAM

## 2024-07-29 PROCEDURE — 31624 DX BRONCHOSCOPE/LAVAGE: CPT | Performed by: STUDENT IN AN ORGANIZED HEALTH CARE EDUCATION/TRAINING PROGRAM

## 2024-07-29 PROCEDURE — 87632 RESP VIRUS 6-11 TARGETS: CPT | Performed by: STUDENT IN AN ORGANIZED HEALTH CARE EDUCATION/TRAINING PROGRAM

## 2024-07-29 PROCEDURE — 7100000009 HC PHASE TWO TIME - INITIAL BASE CHARGE

## 2024-07-29 PROCEDURE — A31628 PR BRONCHOSCOPY,TRANSBRONCH BIOPSY

## 2024-07-29 PROCEDURE — 87801 DETECT AGNT MULT DNA AMPLI: CPT | Performed by: STUDENT IN AN ORGANIZED HEALTH CARE EDUCATION/TRAINING PROGRAM

## 2024-07-29 PROCEDURE — 2500000005 HC RX 250 GENERAL PHARMACY W/O HCPCS

## 2024-07-29 PROCEDURE — 87102 FUNGUS ISOLATION CULTURE: CPT | Performed by: STUDENT IN AN ORGANIZED HEALTH CARE EDUCATION/TRAINING PROGRAM

## 2024-07-29 RX ORDER — PROPOFOL 10 MG/ML
INJECTION, EMULSION INTRAVENOUS AS NEEDED
Status: DISCONTINUED | OUTPATIENT
Start: 2024-07-29 | End: 2024-07-29

## 2024-07-29 RX ORDER — ESMOLOL HYDROCHLORIDE 10 MG/ML
INJECTION INTRAVENOUS AS NEEDED
Status: DISCONTINUED | OUTPATIENT
Start: 2024-07-29 | End: 2024-07-29

## 2024-07-29 RX ORDER — ACETAMINOPHEN 325 MG/1
650 TABLET ORAL EVERY 4 HOURS PRN
Status: DISCONTINUED | OUTPATIENT
Start: 2024-07-29 | End: 2024-07-30 | Stop reason: HOSPADM

## 2024-07-29 RX ORDER — LIDOCAINE HCL/PF 100 MG/5ML
SYRINGE (ML) INTRAVENOUS AS NEEDED
Status: DISCONTINUED | OUTPATIENT
Start: 2024-07-29 | End: 2024-07-29

## 2024-07-29 RX ORDER — ONDANSETRON HYDROCHLORIDE 2 MG/ML
4 INJECTION, SOLUTION INTRAVENOUS ONCE AS NEEDED
Status: DISCONTINUED | OUTPATIENT
Start: 2024-07-29 | End: 2024-07-30 | Stop reason: HOSPADM

## 2024-07-29 RX ORDER — GLYCOPYRROLATE 0.2 MG/ML
INJECTION INTRAMUSCULAR; INTRAVENOUS AS NEEDED
Status: DISCONTINUED | OUTPATIENT
Start: 2024-07-29 | End: 2024-07-29

## 2024-07-29 RX ORDER — ALBUTEROL SULFATE 0.83 MG/ML
2.5 SOLUTION RESPIRATORY (INHALATION) ONCE AS NEEDED
Status: DISCONTINUED | OUTPATIENT
Start: 2024-07-29 | End: 2024-07-30 | Stop reason: HOSPADM

## 2024-07-29 RX ORDER — PHENYLEPHRINE HCL IN 0.9% NACL 0.4MG/10ML
SYRINGE (ML) INTRAVENOUS AS NEEDED
Status: DISCONTINUED | OUTPATIENT
Start: 2024-07-29 | End: 2024-07-29

## 2024-07-29 RX ORDER — SCOLOPAMINE TRANSDERMAL SYSTEM 1 MG/1
PATCH, EXTENDED RELEASE TRANSDERMAL AS NEEDED
Status: DISCONTINUED | OUTPATIENT
Start: 2024-07-29 | End: 2024-07-29

## 2024-07-29 RX ORDER — ROCURONIUM BROMIDE 10 MG/ML
INJECTION, SOLUTION INTRAVENOUS AS NEEDED
Status: DISCONTINUED | OUTPATIENT
Start: 2024-07-29 | End: 2024-07-29

## 2024-07-29 RX ORDER — HYDROMORPHONE HYDROCHLORIDE 1 MG/ML
0.4 INJECTION, SOLUTION INTRAMUSCULAR; INTRAVENOUS; SUBCUTANEOUS EVERY 5 MIN PRN
Status: DISCONTINUED | OUTPATIENT
Start: 2024-07-29 | End: 2024-07-30 | Stop reason: HOSPADM

## 2024-07-29 RX ORDER — LIDOCAINE HYDROCHLORIDE 10 MG/ML
0.1 INJECTION, SOLUTION EPIDURAL; INFILTRATION; INTRACAUDAL; PERINEURAL ONCE
Status: DISCONTINUED | OUTPATIENT
Start: 2024-07-29 | End: 2024-07-30 | Stop reason: HOSPADM

## 2024-07-29 RX ORDER — ONDANSETRON HYDROCHLORIDE 2 MG/ML
INJECTION, SOLUTION INTRAVENOUS AS NEEDED
Status: DISCONTINUED | OUTPATIENT
Start: 2024-07-29 | End: 2024-07-29

## 2024-07-29 RX ORDER — SODIUM CHLORIDE, SODIUM LACTATE, POTASSIUM CHLORIDE, CALCIUM CHLORIDE 600; 310; 30; 20 MG/100ML; MG/100ML; MG/100ML; MG/100ML
100 INJECTION, SOLUTION INTRAVENOUS CONTINUOUS
Status: DISCONTINUED | OUTPATIENT
Start: 2024-07-29 | End: 2024-07-30 | Stop reason: HOSPADM

## 2024-07-29 RX ORDER — HYDROMORPHONE HYDROCHLORIDE 1 MG/ML
0.2 INJECTION, SOLUTION INTRAMUSCULAR; INTRAVENOUS; SUBCUTANEOUS EVERY 5 MIN PRN
Status: DISCONTINUED | OUTPATIENT
Start: 2024-07-29 | End: 2024-07-30 | Stop reason: HOSPADM

## 2024-07-29 ASSESSMENT — COLUMBIA-SUICIDE SEVERITY RATING SCALE - C-SSRS
1. IN THE PAST MONTH, HAVE YOU WISHED YOU WERE DEAD OR WISHED YOU COULD GO TO SLEEP AND NOT WAKE UP?: NO
2. HAVE YOU ACTUALLY HAD ANY THOUGHTS OF KILLING YOURSELF?: NO
6. HAVE YOU EVER DONE ANYTHING, STARTED TO DO ANYTHING, OR PREPARED TO DO ANYTHING TO END YOUR LIFE?: NO

## 2024-07-29 ASSESSMENT — PAIN SCALES - GENERAL
PAINLEVEL_OUTOF10: 0 - NO PAIN

## 2024-07-29 ASSESSMENT — PAIN - FUNCTIONAL ASSESSMENT
PAIN_FUNCTIONAL_ASSESSMENT: 0-10

## 2024-07-29 NOTE — ANESTHESIA PREPROCEDURE EVALUATION
"Patient: Jamil Cisse \"Mau\"    Procedure Information       Date/Time: 07/29/24 0730    Scheduled providers: Antonio Evans MD; Heaven Agosto MD    Procedure: BRONCHOSCOPY    Location: [facility]            Relevant Problems   Anesthesia  H/o vocal cord injury   (+) PONV (postoperative nausea and vomiting)      Cardiac  7/2024 Echo normal LV fxn EF 58%, mod aortic stenosis, mod AR, trace MR/TR/NM, Ao aneurysm 4.3cm    3/2024: Stress test negative for ischemia   (+) Aneurysm of ascending aorta without rupture (CMS-HCC)   (+) Bicuspid aortic valve (HHS-HCC)   (+) HTN (hypertension)   (+) Heart murmur   (+) Hyperlipemia, mixed   (+) Nonrheumatic aortic valve insufficiency      Pulmonary   (+) Obstructive sleep apnea      Neuro   (+) Cervical radiculitis      GI   (+) Crohn's disease (Multi)   (+) GERD (gastroesophageal reflux disease)   (+) Ulcerative colitis (Multi)      Liver   (+) Elevated LFTs   (+) Fatty liver      Endocrine   (+) Class 1 obesity with serious comorbidity and body mass index (BMI) of 34.0 to 34.9 in adult      Hematology   (+) Erythrocytosis      Musculoskeletal   (+) Degenerative arthritis of hip   (+) Displacement of lumbar intervertebral disc without myelopathy   (+) Osteoarthritis of hip   (+) Osteoarthritis of spine with radiculopathy, cervical region   (+) Primary osteoarthritis of right hip      HEENT   (+) Maxillary sinusitis   (+) Mixed hearing loss, bilateral   (+) Seasonal allergies      Respiratory   (+) ILD (interstitial lung disease) (Multi)      Circulatory   (+) Dilatation of thoracic aorta (CMS-HCC)   (+) Mediastinal lymphadenopathy   (+) Transient cerebral ischemia      Genitourinary   (+) Chronic kidney disease   (+) Stage 3a chronic kidney disease (Multi)      Symptoms and Signs   (+) Abnormal CT of the chest       Clinical information reviewed:   Tobacco  Allergies  Meds   Med Hx  Surg Hx   Fam Hx  Soc Hx        NPO Detail:  NPO/Void " Status  Date of Last Liquid: 07/28/24  Time of Last Liquid: 1800  Date of Last Solid: 07/28/24  Time of Last Solid: 1800  Last Intake Type: Clear fluids         Physical Exam    Airway  Mallampati: III  TM distance: >3 FB  Neck ROM: limited     Cardiovascular   Rhythm: regular  Rate: normal  (+) murmur     Dental    Pulmonary   Breath sounds clear to auscultation     Abdominal            Anesthesia Plan    History of general anesthesia?: yes  History of complications of general anesthesia?: no    ASA 3     general   (GA ETT)  intravenous induction   Anesthetic plan and risks discussed with patient.

## 2024-07-29 NOTE — ANESTHESIA POSTPROCEDURE EVALUATION
"Patient: Jamil Cisse \"Mau\"    Procedure Summary       Date: 07/29/24 Room / Location: [facility]    Anesthesia Start: 0759 Anesthesia Stop: 0948    Procedure: BRONCHOSCOPY Diagnosis: Abnormal CT of the chest    Scheduled Providers: Antonio Evans MD; Heaven Agosto MD Responsible Provider: Heaven Agosto MD    Anesthesia Type: general ASA Status: 3            Anesthesia Type: general    Vitals Value Taken Time   /71 07/29/24 1015   Temp 35.9 °C (96.6 °F) 07/29/24 0945   Pulse 62 07/29/24 1015   Resp 18 07/29/24 1015   SpO2 95 % 07/29/24 1015       Anesthesia Post Evaluation    Patient location during evaluation: PACU  Patient participation: complete - patient participated  Level of consciousness: awake and alert  Pain management: satisfactory to patient  Airway patency: patent  Cardiovascular status: acceptable  Respiratory status: acceptable  Hydration status: acceptable  Postoperative Nausea and Vomiting: none        No notable events documented.    "

## 2024-07-29 NOTE — ANESTHESIA PROCEDURE NOTES
Airway  Date/Time: 7/29/2024 8:16 AM  Urgency: elective    Airway not difficult    Staffing  Performed: LAQUITA   Authorized by: Heaven Agosto MD    Performed by: LAQUITA Nguyễn  Patient location during procedure: OR    Indications and Patient Condition  Indications for airway management: anesthesia and airway protection  Spontaneous Ventilation: absent  Sedation level: deep  Preoxygenated: yes  Patient position: sniffing  Mask difficulty assessment: 2 - vent by mask + OA or adjuvant +/- NMBA    Final Airway Details  Final airway type: endotracheal airway      Successful airway: ETT  Cuffed: yes   Successful intubation technique: direct laryngoscopy  Facilitating devices/methods: cricoid pressure  Endotracheal tube insertion site: oral  Blade: Leni  Blade size: #4  ETT size (mm): 8.5  Cormack-Lehane Classification: grade I - full view of glottis  Placement verified by: chest auscultation and capnometry   Measured from: lips  ETT to lips (cm): 23  Number of attempts at approach: 1

## 2024-07-29 NOTE — LETTER
Dear, Jamil Cisse      7/10/2024                                              INFORMATION FOR YOUR PROCEDURE     INSTRUCTIONS MUST BE FOLLOWED OR YOU RUN THE RISK OF YOUR CASE BEING CANCELED     Information is attached to this e-mail for your upcoming procedure. (Look for the paperclip in your email to access this information)  Lab requisitions are also included as well as procedural instructions.    You are scheduled for your Bronchoscopy on 7/29/24,     with Dr. Antonio Evans    Arrival is at  7:00 AM,  for Check In prior to your actual procedure time. This allows us to prepare you for your procedure.  Location:   [place] [address]   Bronchoscopy / Endoscopy Suite   [facility] Room 1300.     Located on the 1st Floor close to the Main entrance of the hospital.  Enter through the front doors, turn left and we are the 1st hallway on the left hand side.(Room 1300 [facility]).  If you park in the visitor's garage you will come in on the second floor and will need to make your way down to the 1st level.     Patient information is right there if assistance is needed.    NPO (No food or drink) after midnight the night before your procedure. This includes coffee, water, and soda, hard candy, gum or mints.  If taking your morning medications, a small sip of water is allowed to get the medication down.    For your safety, you must have a responsible, adult  accompany you to your procedure.  You will not be permitted to drive yourself home if you have received any type of anesthesia or sedation.    Automated calls about your upcoming scheduled appointments can be quite confusing for patients.    The times may vary depending on what you have scheduled for procedure day. Follow the time/s I have given you  so there is no confusion.    Blood work will need to be done prior to your procedure, preferably at a  Facility.  There  are no restrictions for testing.  Your blood work is current.  No need to repeat.    No need to do PAT ( Pre Admission Testing )    Our Nurse Practitioner,  Alee Bartholomew CNP, will call you on 7/25/24, @ 3:00 PM    This is a phone visit to go over your medical history prior to your procedure, and is a necessary part of your workup.  The patient must be present at this phone visit.    Please reach out to me with any questions you may have Lashanda @ [phone] or   Ceasar @ [phone]    Have a nice day!    Lashanda Campos    Bronchoscopy   Interventional Pulmonology    MD Robb Troy MD Sameer Avasarala, MD Catalina Teba, MD Andrew Dunatchik, MD        [facility]  Pulmonary, Critical Care and Sleep Medicine  [address] [place] [address]  P -[phone]  - [phone]  [email or web address]

## 2024-07-30 PROBLEM — G45.9 TRANSIENT CEREBRAL ISCHEMIA: Status: RESOLVED | Noted: 2018-02-10 | Resolved: 2024-07-30

## 2024-07-30 LAB
ADENOVIRUS QPCR, VIRC: NOT DETECTED COPIES/ML
ASPERGILLUS GALACTOMANNAN EIA (NON-BLOOD SPECIMEN): 0.03
CHLAMYDIA.PNEUMONIAE PCR, VIRC: NOT DETECTED
CYTOMEGALOVIRUS DNA PCR, (NON-BLOOD SPECI: NOT DETECTED IU/ML
FUNGITELL BETA-D GLUCAN PCR, QUANTITATIVE (NON-BLOOD SPECIMEN): <45 PG/ML
HUMAN HERPESVIRUS-6 DNA PCR, QUANTITATIVE (NON-BLOOD SPECIMEN): NOT DETECTED COPIES/ML
LEGIONELLA PNEUMO PCR, VIRC: NOT DETECTED
MYCOPLASMA.PNEUMONIAE PCR, VIRC: NOT DETECTED
PAN.LEGIONELLA PCR, VIRC: NOT DETECTED
PATH REVIEW-CELL CT,FLUID: NORMAL
PNEUMOCYSTIS PCR,QUANTITATIVE (NON-BLOOD SPECIMEN): NOT DETECTED COPIES/ML

## 2024-07-31 LAB
ACID FAST STN SPEC: NORMAL
ADENOVIRUS RVP, VIRC: NOT DETECTED
BACTERIA SPEC CULT: NORMAL
BACTERIA SPEC RESP CULT: NORMAL
ENTEROVIRUS/RHINOVIRUS RVP, VIRC: NOT DETECTED
GRAM STN SPEC: NORMAL
HUMAN BOCAVIRUS RVP, VIRC: NOT DETECTED
HUMAN CORONAVIRUS RVP, VIRC: NOT DETECTED
INFLUENZA A , VIRC: NOT DETECTED
INFLUENZA A H1N1-09 , VIRC: NOT DETECTED
INFLUENZA B PCR, VIRC: NOT DETECTED
METAPNEUMOVIRUS , VIRC: NOT DETECTED
MYCOBACTERIUM SPEC CULT: NORMAL
PARAINFLUENZA PCR, VIRC: NOT DETECTED
RSV PCR, RVP, VIRC: NOT DETECTED

## 2024-08-02 LAB
BACTERIA FLD CULT: NORMAL
FUNGUS SPEC CULT: NORMAL
FUNGUS SPEC FUNGUS STN: NORMAL
GRAM STN SPEC: NORMAL
GRAM STN SPEC: NORMAL
LABORATORY COMMENT REPORT: NORMAL
LABORATORY COMMENT REPORT: NORMAL
PATH REPORT.FINAL DX SPEC: NORMAL
PATH REPORT.GROSS SPEC: NORMAL
PATH REPORT.INTRAOP OBS SPEC DOC: NORMAL
PATH REPORT.RELEVANT HX SPEC: NORMAL
PATH REPORT.TOTAL CANCER: NORMAL

## 2024-08-05 LAB
FUNGUS SPEC CULT: NORMAL
FUNGUS SPEC FUNGUS STN: NORMAL

## 2024-08-06 LAB
BACTERIA FLD CULT: NORMAL
GRAM STN SPEC: NORMAL
GRAM STN SPEC: NORMAL

## 2024-08-07 LAB
ACID FAST STN SPEC: NORMAL
MYCOBACTERIUM SPEC CULT: NORMAL

## 2024-08-09 LAB
LABORATORY COMMENT REPORT: NORMAL
PATH REPORT.ADDENDUM SPEC: NORMAL
PATH REPORT.COMMENTS IMP SPEC: NORMAL
PATH REPORT.FINAL DX SPEC: NORMAL
PATH REPORT.GROSS SPEC: NORMAL
PATH REPORT.TOTAL CANCER: NORMAL

## 2024-08-12 LAB
FUNGUS SPEC CULT: NORMAL
FUNGUS SPEC FUNGUS STN: NORMAL

## 2024-08-13 ENCOUNTER — APPOINTMENT (OUTPATIENT)
Dept: ALLERGY | Facility: CLINIC | Age: 54
End: 2024-08-13
Payer: COMMERCIAL

## 2024-08-13 DIAGNOSIS — J30.2 SEASONAL ALLERGIES: ICD-10-CM

## 2024-08-13 PROCEDURE — 95117 IMMUNOTHERAPY INJECTIONS: CPT | Performed by: ALLERGY & IMMUNOLOGY

## 2024-08-14 LAB
ACID FAST STN SPEC: NORMAL
MYCOBACTERIUM SPEC CULT: NORMAL

## 2024-08-19 LAB
FUNGUS SPEC CULT: NORMAL
FUNGUS SPEC FUNGUS STN: NORMAL

## 2024-08-21 LAB
ACID FAST STN SPEC: NORMAL
MYCOBACTERIUM SPEC CULT: NORMAL

## 2024-08-28 LAB
ACID FAST STN SPEC: NORMAL
MYCOBACTERIUM SPEC CULT: NORMAL

## 2024-09-03 ENCOUNTER — APPOINTMENT (OUTPATIENT)
Dept: ALLERGY | Facility: CLINIC | Age: 54
End: 2024-09-03
Payer: COMMERCIAL

## 2024-09-03 DIAGNOSIS — H71.22 CHOLESTEATOMA OF LEFT MASTOID: ICD-10-CM

## 2024-09-03 DIAGNOSIS — J30.9 ALLERGIC RHINITIS, UNSPECIFIED SEASONALITY, UNSPECIFIED TRIGGER: ICD-10-CM

## 2024-09-03 DIAGNOSIS — J30.2 SEASONAL ALLERGIES: ICD-10-CM

## 2024-09-03 DIAGNOSIS — I10 HTN (HYPERTENSION), BENIGN: ICD-10-CM

## 2024-09-03 PROCEDURE — 95117 IMMUNOTHERAPY INJECTIONS: CPT | Performed by: ALLERGY & IMMUNOLOGY

## 2024-09-03 RX ORDER — TRAMADOL HYDROCHLORIDE 50 MG/1
50 TABLET ORAL EVERY 6 HOURS PRN
Qty: 16 TABLET | Refills: 0 | Status: CANCELLED | OUTPATIENT
Start: 2024-09-03

## 2024-09-03 RX ORDER — ACETAMINOPHEN, ASPIRIN (NSAID), AND CAFFEINE 250; 250; 65 MG/1; MG/1; MG/1
1 TABLET, FILM COATED ORAL EVERY 6 HOURS PRN
Qty: 30 TABLET | Status: CANCELLED | OUTPATIENT
Start: 2024-09-03

## 2024-09-04 LAB
ACID FAST STN SPEC: NORMAL
MYCOBACTERIUM SPEC CULT: NORMAL

## 2024-09-04 RX ORDER — ASPIRIN 81 MG/1
81 TABLET ORAL DAILY
Qty: 90 TABLET | Refills: 0 | Status: SHIPPED | OUTPATIENT
Start: 2024-09-04

## 2024-09-04 RX ORDER — TELMISARTAN AND HYDROCHLORTHIAZIDE 80; 25 MG/1; MG/1
1 TABLET ORAL DAILY
Qty: 90 TABLET | Refills: 0 | Status: SHIPPED | OUTPATIENT
Start: 2024-09-04

## 2024-09-04 RX ORDER — FLUTICASONE PROPIONATE 50 MCG
2 SPRAY, SUSPENSION (ML) NASAL DAILY
Qty: 48 G | Refills: 0 | Status: SHIPPED | OUTPATIENT
Start: 2024-09-04

## 2024-09-06 ENCOUNTER — APPOINTMENT (OUTPATIENT)
Dept: OTOLARYNGOLOGY | Facility: CLINIC | Age: 54
End: 2024-09-06
Payer: COMMERCIAL

## 2024-09-11 LAB
ACID FAST STN SPEC: NORMAL
MYCOBACTERIUM SPEC CULT: NORMAL

## 2024-09-16 DIAGNOSIS — E78.2 HYPERLIPEMIA, MIXED: ICD-10-CM

## 2024-09-16 DIAGNOSIS — K21.9 GASTROESOPHAGEAL REFLUX DISEASE WITHOUT ESOPHAGITIS: ICD-10-CM

## 2024-09-16 RX ORDER — ATORVASTATIN CALCIUM 20 MG/1
20 TABLET, FILM COATED ORAL DAILY
Qty: 90 TABLET | Refills: 0 | Status: SHIPPED | OUTPATIENT
Start: 2024-09-16

## 2024-09-16 RX ORDER — OMEPRAZOLE 40 MG/1
40 CAPSULE, DELAYED RELEASE ORAL DAILY
Qty: 90 CAPSULE | Refills: 0 | Status: SHIPPED | OUTPATIENT
Start: 2024-09-16

## 2024-09-18 LAB
ACID FAST STN SPEC: NORMAL
MYCOBACTERIUM SPEC CULT: NORMAL

## 2024-09-24 ENCOUNTER — APPOINTMENT (OUTPATIENT)
Dept: PULMONOLOGY | Facility: CLINIC | Age: 54
End: 2024-09-24
Payer: COMMERCIAL

## 2024-09-27 ENCOUNTER — APPOINTMENT (OUTPATIENT)
Dept: PULMONOLOGY | Facility: CLINIC | Age: 54
End: 2024-09-27
Payer: COMMERCIAL

## 2024-09-27 VITALS
HEIGHT: 69 IN | HEART RATE: 91 BPM | BODY MASS INDEX: 32.94 KG/M2 | DIASTOLIC BLOOD PRESSURE: 82 MMHG | SYSTOLIC BLOOD PRESSURE: 146 MMHG | WEIGHT: 222.4 LBS | RESPIRATION RATE: 18 BRPM

## 2024-09-27 DIAGNOSIS — J84.9 ILD (INTERSTITIAL LUNG DISEASE) (MULTI): Primary | ICD-10-CM

## 2024-09-27 DIAGNOSIS — G47.33 OSA (OBSTRUCTIVE SLEEP APNEA): ICD-10-CM

## 2024-09-27 DIAGNOSIS — R93.89 ABNORMAL CT OF THE CHEST: ICD-10-CM

## 2024-09-27 DIAGNOSIS — R59.0 MEDIASTINAL LYMPHADENOPATHY: ICD-10-CM

## 2024-09-27 DIAGNOSIS — Q23.81 BICUSPID AORTIC VALVE: ICD-10-CM

## 2024-09-27 PROCEDURE — 99215 OFFICE O/P EST HI 40 MIN: CPT | Performed by: INTERNAL MEDICINE

## 2024-09-27 PROCEDURE — 3077F SYST BP >= 140 MM HG: CPT | Performed by: INTERNAL MEDICINE

## 2024-09-27 PROCEDURE — 3079F DIAST BP 80-89 MM HG: CPT | Performed by: INTERNAL MEDICINE

## 2024-09-27 PROCEDURE — 3008F BODY MASS INDEX DOCD: CPT | Performed by: INTERNAL MEDICINE

## 2024-09-27 ASSESSMENT — ENCOUNTER SYMPTOMS
OCCASIONAL FEELINGS OF UNSTEADINESS: 0
DEPRESSION: 0
LOSS OF SENSATION IN FEET: 0

## 2024-09-27 ASSESSMENT — COLUMBIA-SUICIDE SEVERITY RATING SCALE - C-SSRS
6. HAVE YOU EVER DONE ANYTHING, STARTED TO DO ANYTHING, OR PREPARED TO DO ANYTHING TO END YOUR LIFE?: NO
1. IN THE PAST MONTH, HAVE YOU WISHED YOU WERE DEAD OR WISHED YOU COULD GO TO SLEEP AND NOT WAKE UP?: NO
2. HAVE YOU ACTUALLY HAD ANY THOUGHTS OF KILLING YOURSELF?: NO

## 2024-09-27 NOTE — PROGRESS NOTES
Pulmonary ILD and Sarciodosis clinic    Follow up for Lymphadenopathy,  I have independently interviewed and examined the patient in the office and reviewed available records.    Physician LEE (10/2/2024):  A 54-year-old male with past medical history of ulcerative colitis/Crohn's disease, WILFRID who was initially referred to ILD clinic for worsening shortness of breath.  He comes for follow-up visit after extensive workup including EBUS bronchoscopy colonoscopy and cardiac evaluation.  He still complains of shortness of breath states which  is less severe than before he is able to do all his activities of daily living he is physically very active exercising regularly 2-3 times per week.  He has mild occasional cough and no expectoration of phlegm  He denies any chest wheezes chest pain or hemoptysis  Since previous visit he underwent bronchoscopy with EBUS biopsy, lymph node biopsy was negative for malignancy transbronchial biopsy showed ill-defined granuloma.  He also underwent colonoscopy that showed no signs of activity of ulcerative colitis or the medication of ulcerative colitis he switched from Cimzia to Stelara once every 2 months.  He denies any skin rash or arthritis or fever.  He is following with cardiology for bicuspid aortic valve and aortic dilation/aneurysm  His initial CTA showed mediastinal lymphadenopathy and left lower lobe infiltration with bronchiectasis and right consolidative mass.  He had a recent CT follow-up that showed improvement of the consolidative mass but residual atelectasis reticulation on the basis.  His recent PFT shows improvement of FVC FEV1 and DLCO as compared to old PFT, with normalization of the values  He previously was prescribed inhaler and he stopped taking it as he states there is no difference with and without it    Initial documentation of latest visit in the epic  He was initially referred by Trish Ruiz CNP for worsening shortness of breath.  He was diagnosed with  ulcerative colitis 14 years ago by biopsy shows colitis,  diagnosis was reversed by serological markers to combined ulcerative colitis and Crohn's disease he was initially maintained on infliximab and Humira which was not responsive against his colitis also was on steroids on and off.   Infliximab was switched to certolizumab (Cimzia) for which he is maintained on it for the last 6 years frequency of exacerbation of colitis has decreased,  currently his disease is under remission.     He is referred for pulmonary clinic for worsening shortness of breath for the last year and a half he was able to exercise daily for 20 minutes before his complaint of shortness of breath now after few minutes of exercise his shortness of breath and cannot complete his workup.  He has mild infrequent cough sometimes associated with scanty phlegm he denies any fever or chest pain or hemoptysis he has been investigated for respiratory tract infection including TB spot test that was negative.  He had history of COVID infection 3 times in 2020 2021 2022 all of COVID infections where minimal with no associated respiratory failure is also vaccinated against COVID he denies any chest wheezes he has some environmental allergy and is maintained on montelukast  He was maintained on prednisone from 2000 4/12/2014 on various doses his latest exacerbation was in 2016 requiring steroid and since then he was switched to Cimzia     He also has history of WILFRID severe WILFRID with AHI score of 91 he is maintained on CPAP for the last few years he is using CPAP daily more than 7 hours with no daytime symptoms  He has multiple CT chest reviewing of CT images in February 2024 shows mediastinal lymphadenopathy with left lower lobe infiltrate and bronchiectasis and right upper lobe consolidative mass.  He received short course of steroid in March 2024 repeated CT showed resolution of the right upper lobe infiltrate but persistent of the lower lobe consolidation  he had a recent ear surgery.     PFT in June 2023 shows restrictive function repeated PFT in February 2024 shows preserved FEV1 FVC and lung volumes with no affection of DLCO  Immunization History:  Immunization History   Administered Date(s) Administered    Flu vaccine (IIV4), preservative free *Check age/dose* 10/01/2018, 10/01/2019, 09/30/2020, 11/01/2022, 11/20/2023    Flu vaccine, trivalent, preservative free, age 6 months and greater (Fluarix/Fluzone/Flulaval) 10/08/2016    Influenza, Unspecified 11/09/2010, 11/01/2022    Influenza, injectable, quadrivalent 09/15/2017    Influenza, seasonal, injectable 10/05/2021    PPD Test 11/20/2015    Pfizer Gray Cap SARS-CoV-2 08/08/2022    Pfizer Purple Cap SARS-CoV-2 03/07/2021, 12/23/2021    Zoster vaccine, recombinant, adult (SHINGRIX) 08/01/2023, 11/20/2023       Family History:  Family History   Problem Relation Name Age of Onset    Hypertension Mother      Hypertension Father      Stroke Father         Social History:  Social History     Socioeconomic History    Marital status:    Tobacco Use    Smoking status: Former     Current packs/day: 1.00     Average packs/day: 1 pack/day for 6.0 years (6.0 ttl pk-yrs)     Types: Cigarettes    Smokeless tobacco: Never   Vaping Use    Vaping status: Never Used   Substance and Sexual Activity    Alcohol use: Yes     Alcohol/week: 4.0 standard drinks of alcohol     Types: 4 Cans of beer per week     Comment: SOCIAL    Drug use: Never    Sexual activity: Defer     Social Determinants of Health     Food Insecurity: No Food Insecurity (2/9/2024)    Hunger Vital Sign     Worried About Running Out of Food in the Last Year: Never true     Ran Out of Food in the Last Year: Never true       Current Medications:  Current Outpatient Medications   Medication Instructions    aspirin-acetaminophen-caffeine (Excedrin Extra Strength) 250-250-65 mg tablet 1 tablet, oral, Every 6 hours PRN    aspirin 81 mg, oral, Daily    atorvastatin  "(LIPITOR) 20 mg, oral, Daily    cetirizine (ZyrTEC) 10 mg tablet oral, Daily RT    Cimzia Powder for Reconst 200 mg, subcutaneous, Every 14 days    ciprofloxacin-dexamethasone (CiproDEX) otic suspension Instill 4 drops to affected ear twice daily    clindamycin (Cleocin) 300 mg capsule TAKE 2 CAPSULES BY MOUTH ONE HOUR BEFORE APPOINTMENT    colchicine 0.6 mg, oral, Daily    febuxostat (ULORIC) 40 mg, oral, Daily    fluticasone (Flonase) 50 mcg/actuation nasal spray 2 sprays, Each Nostril, Daily    Maxalt-MLT 10 mg, oral, Once as needed, May repeat in 2 hours if unresolved. Do not exceed 30 mg in 24 hours.    montelukast (SINGULAIR) 10 mg, oral, Nightly    omeprazole (PRILOSEC) 40 mg, oral, Daily    telmisartan-hydrochlorothiazid (MIcarDIS HCT) 80-25 mg tablet 1 tablet, oral, Daily    traMADol (ULTRAM) 50 mg, oral, Every 6 hours PRN    Xiidra 5 % dropperette INSTILL 1 DROP IN BOTH EYES TWICE A DAY        Drug Allergies/Intolerances:  Allergies   Allergen Reactions    Adhesive Tape-Silicones Rash and Other     skin pulled off    Codeine Other     \"Skin got so hot\"    Sulfa (Sulfonamide Antibiotics) Other    Adhesive Other and Unknown     Pulls skin off    Amlodipine Besylate Rash     Flush    Lisinopril Other     ineffective    Sumatriptan Other     ineffective    Valsartan Other     ineffective        Review of Systems:  No fever  No arthritis  No skin rash  No GI symptoms    Physical Examination:  /82   Pulse 91   Resp 18   Ht 1.753 m (5' 9\")   Wt 101 kg (222 lb 6.4 oz)   BMI 32.84 kg/m²      General: ambulated independently; no acute distress; well-nourished; work of breathing was not increased; normal vocal character  HEENT: normocephalic; anicteric sclerae; conjunctivae not injected; nasal mucosa was unremarkable; oropharynx was clear without evidence of thrush; dentition was good.  Neck: supple; no lymphadenopathy or thyromegaly.  Chest: Fine inspiratory crepitation   cardiac: regular rhythm; no " gallop or murmur.  Abdomen: soft; non-tender; non-distended; no hepatosplenomegaly.  Extremities: no leg edema; no digital clubbing; 2+ pulses  Psychiatric: did not appear depressed or anxious.    Chest CT Scan     CT chest high resolution 07/26/2024    Narrative  Interpreted By:  Demarcus Galicia,  STUDY:  CT CHEST HIGH RESOLUTION;  7/26/2024 12:07 pm    INDICATION:  Signs/Symptoms:ILD.    COMPARISON:  Chest CT angiogram 06/17/2024, calcium scoring CT scan 02/20/2024 and  PET-CT 03/12/2024.    ACCESSION NUMBER(S):  [id number]    ORDERING CLINICIAN:  MICHELLE ROSE    TECHNIQUE:  Using helical multidetector technique, volumetric data acquisition of  the chest was obtained without intravenous administration of contrast  material under the high resolution chest CT protocol. Examination  includes contiguous slices through the chest in supine positioning  during inspiration, noncontiguous axial slices in supine positioning  during expiration and prone positioning during inspiration.    FINDINGS:  LUNGS AND AIRWAYS:  The trachea and central airways are patent. No endobronchial lesion  is seen.    Interval improvement in bilateral lower lobe interstitial prominence  as well as left lower lobe consolidative opacities seen on recent CT  scan 06/17/2024. Presently, there is residual mild interstitial  prominence and subtle ground-glass opacities within bilateral lower  lungs, particularly within left lower lobe. No new airspace  consolidation, pleural effusion or pneumothorax. No suspicious  pulmonary nodules or masses. No definite evidence of pulmonary  fibrosis, although the assessment is limited by above-mentioned acute  pulmonary parenchymal changes. Expiratory imaging is technically  limited and there is mild multifocal air trapping noted. Prone  imaging reveals improvement of bibasilar atelectasis and no evidence  of pulmonary fibrosis.    MEDIASTINUM AND MAYELIN, LOWER NECK AND AXILLA:  The visualized thyroid gland is  within normal limits.  Multiple prominent to mildly enlarged mediastinal lymph nodes  measuring up to 1.4 cm within right upper paratracheal location  (image 77 of 329), which are unchanged since prior comparative  examinations. Esophagus appears within normal limits as seen.    HEART AND VESSELS:  Stable fusiform dilatation ascending thoracic aorta measuring up to  4.2 cm in diameter. Rest of thoracic aorta is normal in course and  caliber. Main pulmonary artery and its branches are normal in caliber.  No coronary artery calcifications are seen. Please note, the study is  not optimized for evaluation of coronary arteries. The cardiac  chambers are not enlarged.There are prominent aortic valvular  calcifications seen. There is no pericardial effusion seen.    UPPER ABDOMEN:  There is a rounded hypodense lesion (average 40 HU) within left  kidney measuring 2.6 cm (image 319 of series 201), which is unchanged  since prior examinations up to 02/20/2024, although not optimally  characterized. This lesion was photopenic on recent PET-CT. The  patient is status post prior right nephrectomy. Rest of the  visualized subdiaphragmatic structures demonstrate no remarkable  findings.    CHEST WALL AND OSSEOUS STRUCTURES:  Chest wall is within normal limits.  No acute osseous pathology.There are no suspicious osseous lesions.    Impression  1. Improved but residual mild interstitial prominence and subtle  ground-glass opacities within bilateral lower lungs, particularly the  left lower lobe. No new airspace consolidation.  2. Given the above-mentioned acute pulmonary parenchymal changes, no  rachel fibrosis within lungs. If there is persistent concern for the  same, a follow-up high-resolution chest CT can be obtained after  subsidence of acute changes.  3. Suggestion of mild multifocal air trapping on technically limited  expiratory imaging, which may represent a component of underlying  small airway disease.  4. Multiple  prominent to mildly enlarged mediastinal lymph nodes,  which are similar to prior examinations. Continued attention on  follow-up CT scan within 3-6 months is recommended.  5. A 2.6 cm left renal focal lesion, which is stable since prior CT  scans up to 02/20/2024. Given the internal higher than fluid  attenuation, findings are indeterminate for malignancy. (**-YCF-**)  Further evaluation with outpatient MRI of kidneys without and with  contrast versus without and with contrast outpatient CT scan is  recommended.(German HENSON, Michael EDWARDS, Mirian KOENIG, et al. Management of  the Incidental Renal Mass on CT: A White Paper of the ACR Incidental  Findings Committee. J Am Reji Radiol. 2018;15(2):264-273.)  [email or web address]  6. Stable fusiform dilatation of ascending thoracic aorta measuring  up to 4.2 cm in diameter.  7. Redemonstrated prominent aortic valvular calcifications seen,  better assessed on echocardiogram 02/29/2024.      MACRO:  Incidental Finding:  Renal lesion showing homogenous attenuation and  thin wall with no septation/calcification/mural nodules. Given the  internal higher than fluid attenuation, findings are indeterminate  for malignancy. (**-YCF-**)    Instructions:  Further evaluation with outpatient MRI of kidneys  without and with contrast versus without and with contrast outpatient  CT scan is recommended. (German HENSON, Michael EDWARDS, Mirian KOENIG, et al.  Management of the Incidental Renal Mass on CT: A White Paper of the  ACR Incidental Findings Committee. J Am Reji Radiol.  2018;15(2):264-273.) [email or web address]    Signed by: Demarcus Galicia 7/26/2024 1:18 PM  Dictation workstation:   [id number]       Co-morbidities Problem List    Pathology results of EBUs:The biopsy shows fragments of lung parenchyma with scattered poorly-formed granulomata, which is focally admixed with cellular interstitial infiltrate, and contains giant cells with calcified anastasai bodies. These findings are not  specific, and differential diagnosis includes hypersensitivity pneumonia, infection, sarcoidosis, among others. Clinical and radiologic correlation is recommended.     Pathology    scattered poorly-formed granulomata, which is focally admixed with cellular interstitial infiltrate, and contains giant cells with calcified anastasia bodies. These findings are not specific, and differential diagnosis includes hypersensitivity pneumonia, infection, sarcoidosis, among others     Assessment and Plan / Recommendations:  Problem List Items Addressed This Visit       WILFRID (obstructive sleep apnea)    Overview     Severe based on 3/1/2023 Sleep Study         Bicuspid aortic valve    Overview     Noted on 2/29/2024 ECHO.         ILD (interstitial lung disease) (Multi) - Primary    Relevant Orders    CT chest wo IV contrast    Mediastinal lymphadenopathy    Abnormal CT of the chest       ILD:   Localized basal reticulation of indeterminate etiology,   left lower lobe infiltrate: Improved in recent CT done in June 2024, previous CT in February 2024   Right upper lobe nodule cleared in the new CT as compared to the previous one.  Differential diagnoses include drug-induced reaction to Cimzia, versus ulcerative colitis related ILD, infection  another differential diagnosis, less likely sarcoidosis (although ACE level is high)  BAL infection workup by bronchoscopy was negative  - Bronchoscopy with transbronchial biopsy negative for malignancy ill-defined granuloma  ESR C-reactive protein is negative, colonoscopy negative for active disease  Most likely underlying cause drug-induced pneumonitis secondary to Cimzia     2-Mediastinal lymphadenopathy:   EBUS with lymph node biopsy negative for malignancy  Recommend follow-up CT chest     3-Remarkable shortness of breath:  Recent PFT within normal limits  Shortness of breath is out of proportion of lung function  Given underlying history of bicuspid aortic valve and aortic aneurysm, can be  contributing with shortness of breath    4-WILFRID:  Compliant with CPAP use  Minimal residual AHI     5-Bicuspid aortic valve, combined aortic valve stenosis and regurge: moderate degree:  Aortic valve area is 0.53 cm  Aortic aneurysm:   Aortic stenosis Can be the the underlying etiology of remarkable SOB    Renal cyst : for follow up by PCP         I have independently interviewed and examined the patient in the office and reviewed available record  I personally reviewed CT images echo reports and lab results  Professional time of this encounter 70 minutes    Jacques Amado MD  09/27/2024

## 2024-09-30 DIAGNOSIS — M1A.9XX0 CHRONIC GOUT WITHOUT TOPHUS, UNSPECIFIED CAUSE, UNSPECIFIED SITE: ICD-10-CM

## 2024-09-30 RX ORDER — FEBUXOSTAT 40 MG/1
40 TABLET, FILM COATED ORAL DAILY
Qty: 90 TABLET | Refills: 0 | Status: SHIPPED | OUTPATIENT
Start: 2024-09-30

## 2024-10-01 ENCOUNTER — APPOINTMENT (OUTPATIENT)
Dept: ALLERGY | Facility: CLINIC | Age: 54
End: 2024-10-01
Payer: COMMERCIAL

## 2024-10-01 DIAGNOSIS — J30.2 SEASONAL ALLERGIES: ICD-10-CM

## 2024-10-01 PROCEDURE — 95117 IMMUNOTHERAPY INJECTIONS: CPT | Performed by: ALLERGY & IMMUNOLOGY

## 2024-10-05 NOTE — PROGRESS NOTES
"History of present illness:  Jamil Cisse 54 y.o. male  presenting today for follow-up. He is status post left tympanoplasty,mastoidectomy, ossiculoplasty (TORP) by Dr Jamil Brizuela. The surgery was in June 2024. The patient reports a recurrence of pain and drainage approximately two days ago. There is significant subjective improvement in hearing.     The patient's current medications, active allergies and list of medical problems were reviewed in the EHR and confirmed electronically there are as follows;    Allergies:   Allergies   Allergen Reactions    Adhesive Tape-Silicones Rash and Other     skin pulled off    Codeine Other     \"Skin got so hot\"    Sulfa (Sulfonamide Antibiotics) Other    Adhesive Other and Unknown     Pulls skin off    Amlodipine Besylate Rash     Flush    Lisinopril Other     ineffective    Sumatriptan Other     ineffective    Valsartan Other     ineffective     Problem list:  Patient Active Problem List   Diagnosis    Allergic rhinitis    Cervical radiculitis    Crohn's disease (Multi)    Erythrocytosis    GERD (gastroesophageal reflux disease)    Gout    Hip pain    History of total hip replacement    HTN (hypertension)    Hyperlipemia, mixed    Migraine headache    Nontraumatic incomplete tear of right rotator cuff    Osteoarthritis of hip    Osteoarthritis of spine with radiculopathy, cervical region    Rotator cuff tear    Skin tag of perianal region    Stage 3a chronic kidney disease (Multi)    Personal history of renal cancer    WILFRID (obstructive sleep apnea)    Elevated LFTs    Excessive daytime sleepiness    Mixed hearing loss, bilateral    Shoulder pain    Snoring    Angioedema    Contusion of hip    Displacement of lumbar intervertebral disc without myelopathy    ED (erectile dysfunction)    Lumbago    Maxillary sinusitis    Reflux esophagitis    Ulcerative colitis    Degenerative arthritis of hip    Primary osteoarthritis of right hip    Class 1 obesity with serious " comorbidity and body mass index (BMI) of 34.0 to 34.9 in adult    Tympanic membrane perforation    Nontraumatic rupture of muscle or tendon of rotator cuff of right shoulder    Hypercalcemia    History of encephalitis    Heart murmur    Gouty arthritis of toe    Elevation of levels of liver transaminase levels    Disorder of anus    Allergic conjunctivitis    Fatty liver    Cholesteatoma of middle ear and mastoid    Bicuspid aortic valve    Left ventricular hypertrophy    Seasonal allergies    Dilatation of thoracic aorta (CMS-HCC)    Chronic kidney disease    Encounter to discuss test results    Encounter for pre-operative cardiovascular clearance    Cholesteatoma of left mastoid    Aneurysm of ascending aorta without rupture (CMS-HCC)    Nonrheumatic aortic valve insufficiency    Lymphadenopathy    ILD (interstitial lung disease) (Multi)    Mediastinal lymphadenopathy    Abnormal CT of the chest    PONV (postoperative nausea and vomiting)     Current list of medications:   Current Outpatient Medications   Medication Sig Dispense Refill    aspirin 81 mg EC tablet Take 1 tablet (81 mg) by mouth once daily. 90 tablet 0    aspirin-acetaminophen-caffeine (Excedrin Extra Strength) 250-250-65 mg tablet Take 1 tablet by mouth every 6 hours if needed.      atorvastatin (Lipitor) 20 mg tablet Take 1 tablet (20 mg) by mouth once daily. 90 tablet 0    certolizumab pegol (Cimzia Powder for Reconst) injection Inject 1 mL (200 mg) under the skin every 14 (fourteen) days.      cetirizine (ZyrTEC) 10 mg tablet Take by mouth once daily.      ciprofloxacin-dexamethasone (CiproDEX) otic suspension Instill 4 drops to affected ear twice daily 7.5 mL 0    clindamycin (Cleocin) 300 mg capsule TAKE 2 CAPSULES BY MOUTH ONE HOUR BEFORE APPOINTMENT      colchicine 0.6 mg tablet Take 1 tablet (0.6 mg) by mouth once daily. 90 tablet 0    febuxostat (Uloric) 40 mg tablet Take 1 tablet (40 mg) by mouth once daily. 90 tablet 0    fluticasone  (Flonase) 50 mcg/actuation nasal spray Administer 2 sprays into each nostril once daily. 48 g 0    Maxalt-MLT 10 mg disintegrating tablet Take 1 tablet (10 mg) by mouth 1 time if needed for migraine. May repeat in 2 hours if unresolved. Do not exceed 30 mg in 24 hours. 30 tablet 0    montelukast (Singulair) 10 mg tablet Take 1 tablet (10 mg) by mouth once daily at bedtime. 90 tablet 1    omeprazole (PriLOSEC) 40 mg DR capsule Take 1 capsule (40 mg) by mouth once daily. 90 capsule 0    telmisartan-hydrochlorothiazid (MIcarDIS HCT) 80-25 mg tablet Take 1 tablet by mouth once daily. 90 tablet 0    traMADol (Ultram) 50 mg tablet Take 1 tablet (50 mg) by mouth every 6 hours if needed for severe pain (7 - 10). (Patient not taking: Reported on 7/12/2024) 16 tablet 0    Xiidra 5 % dropperette INSTILL 1 DROP IN BOTH EYES TWICE A DAY       No current facility-administered medications for this visit.     Medical history:  Past Medical History:   Diagnosis Date    Abnormal ECG 09/16/2022    Normal sinus rhythm Nonspecific T wave abnormality    Arthritis     Bicuspid aortic valve (HHS-HCC)     Cholesteatoma of middle ear and mastoid     CKD (chronic kidney disease)     stage 3, seen by Dr. Kemp (Nephology)    Dilated aortic root (CMS-HCC)     Minimally dilated aortic root 39 mm.    Diverticulosis     GERD (gastroesophageal reflux disease)     Gout     Heart murmur     Echo scheduled for 02/29/24    History of PFTs 06/16/2023    Dyspnea; Mild restriction on PFTs    HL (hearing loss)     Hyperlipidemia     Hypertension     Lung nodules     Nonspecific 1.9 cm right upper lobe part solid nodule with mediastinal lymphadenopathy    Mild aortic valve regurgitation     Obesity     Personal history of infections of the central nervous system 02/2018    History of Herpes Encephalitis    PONV (postoperative nausea and vomiting)     Reactive airway disease (HHS-HCC)     Renal cancer (Multi) 2003    s/p right radical nephrectomy in 2003.     Renal mass, left     Inadequately characterized 2.6 cm left renal mass. Consider further evaluation with renal US    Shortness of breath     seen by LJ Baptiste (pulmonology)    Sinusitis     Sleep apnea     Uses CPAP    Thoracic ascending aortic aneurysm (CMS-HCC) 02/21/2024    4.3cm per CT scan on 02/21/24    Ulcerative colitis (Multi)     Treated with Cimzia    Vision loss     Wears contacts     Surgical history:  Past Surgical History:   Procedure Laterality Date    COLONOSCOPY      CT CHEST WO IV CONTRAST  02/20/2024    Nonspecific 1.9 cm right upper lobe part solid nodule with mediastinal lymphadenopathy.Ascending thoracic aortic aneurysm, 4.3 cm.    HERNIA REPAIR      NEPHRECTOMY Right 2005    Right total nephrectomy    OTHER SURGICAL HISTORY      Left Shoulder surgery    OTHER SURGICAL HISTORY  07/09/2020    Ear surgery x3    OTHER SURGICAL HISTORY  07/09/2020    Finger surgical procedure    OTHER SURGICAL HISTORY  07/09/2020    Throat surgery    ROTATOR CUFF REPAIR Right 09/23/2022    Right rotator cuff repair with subacromial decompression    TOTAL HIP ARTHROPLASTY Bilateral 05/2017    Bilateral Hip replacement    US CAROTID DOPPLER LEFT  2018    NO HEMODYNAMICALLY SIGNIFICANT VASCULAR STENOSIS IN THE CAROTID AND VERTEBRAL ARTERIES IN THE NECK    VASECTOMY       Family history:  Family History   Problem Relation Name Age of Onset    Hypertension Mother      Hypertension Father      Stroke Father       Social history:  Social History     Tobacco Use    Smoking status: Former     Current packs/day: 1.00     Average packs/day: 1 pack/day for 6.0 years (6.0 ttl pk-yrs)     Types: Cigarettes    Smokeless tobacco: Never   Vaping Use    Vaping status: Never Used   Substance Use Topics    Alcohol use: Yes     Alcohol/week: 4.0 standard drinks of alcohol     Types: 4 Cans of beer per week     Comment: SOCIAL    Drug use: Never       Physical Examination:  CONSTITUTIONAL:  No acute distress  VOICE:   No hoarseness or other abnormality  RESPIRATION:  Breathing comfortably, no stridor  CV:  No clubbing/cyanosis/edema in hands  EYES:  EOM intact, sclera clear  NEURO:  Alert and oriented times 3, Cranial nerves II-XII grossly intact and symmetric bilaterally  HEAD AND FACE:  Symmetric facial features, no masses or lesions  RIGHT EAR:  TM with tympanosclerosis. Normal external ear and post auricular area, no visible lesions, external auditory canal patent.   LEFT EAR: There is evidence of myringitis the graft. There was a small cut of the tissue and purulent drainage. Graft appears intact. Normal external ear and post auricular area, no visible lesions, external auditory canal patent.   NOSE:  Anterior rhinoscopy clear, no significant external deformity.  ORAL CAVITY/OROPHARYNX/LIPS:  normal lining, mobile tongue.  PHARYNGEAL WALLS: Moist mucosal lining, good palatal elevation  NECK/LYMPH:  No LAD, no thyroid masses, trachea midline  SKIN:  Neck and facial skin is without scar or injury  PSYCH:  Alert and oriented with appropriate mood and affect    Impression:  Left chronic otitis media   Left myringitis  History of cholesteatoma   Status post tympanoplasty, mastoidectomy and ossiculoplasty     Recommendation:  Will send Ciprodex drops for 7 days. Follow-up in 6 months. Will get repeat MRI in a year from now.     I discussed with the patient the complexity of my medical decision making including the treatment and testing rational, indications of their elective procedure and possible adverse effects and/or complications. Based on the provided documentation and my professional assessment of this patient's chronic condition with acute exacerbation, the complexity of evaluation and treatment is low.    This note was created using speech recognition transcription software. Despite proofreading, several typographical errors might be present that might affect the meaning of the content. Please call with any  questions.    Scribe Attestation  By signing my name below, I, Annamaria Chavez Scrpratik attest that this documentation has been prepared under the direction and in the presence of Candis Bautista MD.

## 2024-10-07 ENCOUNTER — APPOINTMENT (OUTPATIENT)
Dept: OTOLARYNGOLOGY | Facility: CLINIC | Age: 54
End: 2024-10-07
Payer: COMMERCIAL

## 2024-10-07 VITALS — HEIGHT: 69 IN | BODY MASS INDEX: 33.69 KG/M2 | WEIGHT: 227.5 LBS

## 2024-10-07 DIAGNOSIS — H71.22 CHOLESTEATOMA OF LEFT MIDDLE EAR AND MASTOID: ICD-10-CM

## 2024-10-07 DIAGNOSIS — H73.12 CHRONIC MYRINGITIS OF LEFT EAR: Primary | ICD-10-CM

## 2024-10-07 PROCEDURE — 99213 OFFICE O/P EST LOW 20 MIN: CPT | Performed by: OTOLARYNGOLOGY

## 2024-10-07 PROCEDURE — 3008F BODY MASS INDEX DOCD: CPT | Performed by: OTOLARYNGOLOGY

## 2024-10-07 PROCEDURE — 1036F TOBACCO NON-USER: CPT | Performed by: OTOLARYNGOLOGY

## 2024-10-07 RX ORDER — CIPROFLOXACIN AND DEXAMETHASONE 3; 1 MG/ML; MG/ML
SUSPENSION/ DROPS AURICULAR (OTIC)
Qty: 7.5 ML | Refills: 0 | Status: SHIPPED | OUTPATIENT
Start: 2024-10-07

## 2024-10-10 ENCOUNTER — APPOINTMENT (OUTPATIENT)
Dept: PRIMARY CARE | Facility: CLINIC | Age: 54
End: 2024-10-10
Payer: COMMERCIAL

## 2024-10-10 VITALS
SYSTOLIC BLOOD PRESSURE: 129 MMHG | DIASTOLIC BLOOD PRESSURE: 79 MMHG | HEIGHT: 69 IN | BODY MASS INDEX: 32.95 KG/M2 | OXYGEN SATURATION: 94 % | HEART RATE: 91 BPM | WEIGHT: 222.5 LBS | TEMPERATURE: 97.7 F

## 2024-10-10 DIAGNOSIS — E78.2 HYPERLIPEMIA, MIXED: ICD-10-CM

## 2024-10-10 DIAGNOSIS — N52.9 ERECTILE DYSFUNCTION, UNSPECIFIED ERECTILE DYSFUNCTION TYPE: ICD-10-CM

## 2024-10-10 DIAGNOSIS — K50.919 CROHN'S DISEASE WITH COMPLICATION, UNSPECIFIED GASTROINTESTINAL TRACT LOCATION: ICD-10-CM

## 2024-10-10 DIAGNOSIS — R68.82 DECREASED LIBIDO: ICD-10-CM

## 2024-10-10 DIAGNOSIS — M1A.9XX0 CHRONIC GOUT WITHOUT TOPHUS, UNSPECIFIED CAUSE, UNSPECIFIED SITE: ICD-10-CM

## 2024-10-10 DIAGNOSIS — N18.31 STAGE 3A CHRONIC KIDNEY DISEASE (MULTI): ICD-10-CM

## 2024-10-10 DIAGNOSIS — Z23 NEED FOR VACCINATION: ICD-10-CM

## 2024-10-10 DIAGNOSIS — Z12.5 PROSTATE CANCER SCREENING: ICD-10-CM

## 2024-10-10 DIAGNOSIS — G43.009 MIGRAINE WITHOUT AURA AND WITHOUT STATUS MIGRAINOSUS, NOT INTRACTABLE: ICD-10-CM

## 2024-10-10 DIAGNOSIS — I71.21 ANEURYSM OF ASCENDING AORTA WITHOUT RUPTURE (CMS-HCC): ICD-10-CM

## 2024-10-10 DIAGNOSIS — K21.9 GASTROESOPHAGEAL REFLUX DISEASE WITHOUT ESOPHAGITIS: ICD-10-CM

## 2024-10-10 DIAGNOSIS — G47.33 OBSTRUCTIVE SLEEP APNEA: ICD-10-CM

## 2024-10-10 DIAGNOSIS — R06.02 SOB (SHORTNESS OF BREATH): ICD-10-CM

## 2024-10-10 DIAGNOSIS — I10 PRIMARY HYPERTENSION: Primary | ICD-10-CM

## 2024-10-10 PROCEDURE — 3074F SYST BP LT 130 MM HG: CPT | Performed by: FAMILY MEDICINE

## 2024-10-10 PROCEDURE — 99214 OFFICE O/P EST MOD 30 MIN: CPT | Performed by: FAMILY MEDICINE

## 2024-10-10 PROCEDURE — 3008F BODY MASS INDEX DOCD: CPT | Performed by: FAMILY MEDICINE

## 2024-10-10 PROCEDURE — 90656 IIV3 VACC NO PRSV 0.5 ML IM: CPT | Performed by: FAMILY MEDICINE

## 2024-10-10 PROCEDURE — 90471 IMMUNIZATION ADMIN: CPT | Performed by: FAMILY MEDICINE

## 2024-10-10 PROCEDURE — 3078F DIAST BP <80 MM HG: CPT | Performed by: FAMILY MEDICINE

## 2024-10-10 RX ORDER — SILDENAFIL 100 MG/1
100 TABLET, FILM COATED ORAL DAILY PRN
Qty: 30 TABLET | Refills: 0 | Status: SHIPPED | OUTPATIENT
Start: 2024-10-10 | End: 2025-10-10

## 2024-10-10 NOTE — PROGRESS NOTES
Subjective   Patient ID: Mau Cisse is a 54 y.o. male who presents for Follow-up.    HPI     Patient declines Covid vaccine today.       No recent BW  Colonoscopy: 11/2022  PSA: 10/13/2023    Since last visit, he underwent endobronchial US with BX and no findings of malignancy or sarcoidosis.  Continues to follow with pulmonology, Dr. Amado.    Continues to follow with ENT.  Underwent left tympanoplasty,mastoidectomy, ossiculoplasty (TORP) by Dr Jamil Brizuela in June 2024.     C/O erectile issues recently.  Has trouble getting and maintaining an erection.  There may be a little decrease in his libido.    His previously noted shortness of breath has improved but not returned to baseline.    Patient has hypertension.  He does not monitor BP at home.   Denies CP, SOB, dizziness, and LE edema.   Patient is compliant with his antihypertensive therapy and denies any noted side effects.     He has hyperlipidemia.  Patient has been compliant with his statin therapy and denies any noted side effects.  Pt does try to reduce the amount of cholesterol and fatty foods he consumes.     Pt has GERD.  GERD is stable with the current dose of omeprazole.   He denies any breakthrough reflux symptoms.     Patient has allergic rhinitis  His allergies are well controlled with a combination of Zyrtec and fluticasone nasal spray.     Pt has gout.  He takes Colchicine prn.   He denies any flare-ups since his last visit here.     He has a history of osteoarthritis of both hips.  Has undergone bilat hip replacements.  Hips joints are feeling good.     Pt has Crohn's disease.  He sees GI ( Dr. Richard) on a regular basis.   He is no longer on Cimzia injections and will be changing medications.  He currently denies any abdominal pain, nausea, vomiting, diarrhea, melena, or hematochezia.     Patient has migraine headaches.   He takes Maxalt as needed for his migraines.  Patient has been unable to take the generic version of Maxalt  "(rizatriptan) as it gives him dizziness, upset stomach, nausea, and lethargy.   He tolerates the brand-name medication without noted side effects.       He has a bicuspid aortic valve and aortic dilation/aneurysm.  Following with cardiology at the [facility].      Pt has severe obstructive sleep apnea.  Continues to use his CPAP machine at least 7 hours per night I denies any daytime somnolence.    Review of Systems  Constitutional: Patient denies any fever, chills, loss of appetite, or unexplained weight loss.z    Cardiovascular: Patient denies any chest pain, still with some mild shortness of breath with exertion, tachycardia, palpitations, orthopnea, or paroxysmal nocturnal dyspnea.    Respiratory:   Has occasional cough.  SOB is better but not resolved.    Gastrointestinal: Patient denies any nausea, vomiting, diarrhea, constipation, melena, hematochezia, or reflux symptoms.  Skin: Denies any rashes or skin lesions.   Neurology: Patient denies any new motor or sensory losses.  Denies any numbness, tingling, weakness, and incoordination of the extremities.  Patient also denies any tremor, seizures, or gait instability.  Endocrinology: Denies any polyuria, polydipsia, polyphagia, or heat/cold intolerance.          Objective   /79   Pulse 91   Temp 36.5 °C (97.7 °F) (Temporal)   Ht 1.753 m (5' 9\")   Wt 101 kg (222 lb 8 oz)   SpO2 94%   BMI 32.86 kg/m²     Physical Exam  General Appearance: Alert and cooperative, in no acute distress, well-developed/well-nourished.  Neck: Supple and without adenopathy or rigidity.  There is no JVD at 90° and no carotid bruits are noted.  There is no thyromegaly, thyroid tenderness, or palpable thyroid nodules.    Heart: Regular rate and rhythm with grade 1/6 murmur at the RSB and no noted ectopy.    Respiratory: Lungs are clear to auscultation bilaterally with good air exchange.  Good respiratory effort and no accessory muscle use.  Skin: Good turgor, moist, warm " and without rashes or lesions.  Neurological exam: Alert and oriented ×3, no tremor, normal gait.  Extremities: No clubbing, cyanosis, or edema      Assessment/Plan     Erectile dysfunction / decreased libido:  This is a new complaint for the patient and has been ongoing for several months.  We will check a thyroid and testosterone level with his next labs.  We discussed available treatment options and he would prefer to try Viagra.   Risks, benefits, and side effects of the medication were discussed at length.  Questions were answered to the patient's satisfaction the patient wishes to proceed with treatment.  He was provided with a prescription for Viagra and was advised to take it 30 to 60 minutes prior to intercourse.      HTN:   Stable at this time.  We will continue with current medications.    HLD: Stable based on recent labs.  Risk factor reduction for CAD was discussed at length.  He has tolerated the statin therapy without side effects.  3/28/2024: Stress test normal.     GERD: Stable based on symptoms.  He will continue the current dosing of omeprazole and appropriate dietary changes.     Migraine headaches: Stable on the current treatment plan.  Patient will continue to use the Maxalt as needed. The brand-name Maxalt has continued to work well.  HE CAN NOT TOLERATE THE GENERIC FORM AS HE EXPERIENCES SIDE EFFECTS OF DIZZINESS, UPSET STOMACH, NAUSEA, AND LETHARGY.      Crohn's Disease: Stable based on symptoms.  We will continue the current medication and continue to follow with his GI specialist.     Gout: Stable based on symptoms as has not had a recent flare.  We will continue to monitor.  We discussed goal of getting his uric acid level below 6.  At that point we can consider stopping the Colchicine.      Stage 3a CKD with hx of right nephrectomy 2005) for renal CA: Stable based on labs.  He was encouraged to avoid NSAIDs.   He is currently stable per nephrology and will be seen once a year.       Obstructive sleep apnea:  He was started on CPAP but could not tolerate the pressure at 16 cm H2O.   Was changed to Auto pap and referred to sleep medicine.  He is to continue nightly use of the CPAP.     SOB with exertion:  Symptoms have persisted but have improved.  Will continue to follow with pulmonology and cardiology at this point.  No underlying cause positively identified yet.      Aneurysm of the ascending aorta / Bicuspid aortic valve:   Pt has seen cardiology and has follow up scheduled.  Will need surgical repair at some point in the future.      Follow up in 3 months.

## 2024-10-10 NOTE — PATIENT INSTRUCTIONS
Follow up in 3 months with labs to be done PRIOR.    It was a pleasure to see you today. Thank you for choosing us for your health care needs.    If you have lab or other testing completed and have not been informed of results within one week, please call the office for your results.    If you haven't done so, consider signing up for [facility], the [facility] personal health record. Ask the staff how you can get started.     No

## 2024-10-29 ENCOUNTER — APPOINTMENT (OUTPATIENT)
Dept: ALLERGY | Facility: CLINIC | Age: 54
End: 2024-10-29
Payer: COMMERCIAL

## 2024-10-29 DIAGNOSIS — J30.2 SEASONAL ALLERGIES: ICD-10-CM

## 2024-10-29 PROCEDURE — 95117 IMMUNOTHERAPY INJECTIONS: CPT | Performed by: ALLERGY & IMMUNOLOGY

## 2024-11-07 ENCOUNTER — LAB (OUTPATIENT)
Dept: LAB | Facility: LAB | Age: 54
End: 2024-11-07
Payer: COMMERCIAL

## 2024-11-07 DIAGNOSIS — I10 ESSENTIAL HYPERTENSION: ICD-10-CM

## 2024-11-07 DIAGNOSIS — N18.31 STAGE 3A CHRONIC KIDNEY DISEASE (MULTI): Primary | ICD-10-CM

## 2024-11-07 DIAGNOSIS — N18.31 STAGE 3A CHRONIC KIDNEY DISEASE (MULTI): ICD-10-CM

## 2024-11-07 LAB
25(OH)D3 SERPL-MCNC: 57 NG/ML (ref 30–100)
ANION GAP SERPL CALC-SCNC: 13 MMOL/L (ref 10–20)
BUN SERPL-MCNC: 21 MG/DL (ref 6–23)
CALCIUM SERPL-MCNC: 10.2 MG/DL (ref 8.6–10.3)
CHLORIDE SERPL-SCNC: 101 MMOL/L (ref 98–107)
CO2 SERPL-SCNC: 30 MMOL/L (ref 21–32)
CREAT SERPL-MCNC: 1.48 MG/DL (ref 0.5–1.3)
EGFRCR SERPLBLD CKD-EPI 2021: 56 ML/MIN/1.73M*2
GLUCOSE SERPL-MCNC: 70 MG/DL (ref 74–99)
POTASSIUM SERPL-SCNC: 4.1 MMOL/L (ref 3.5–5.3)
SODIUM SERPL-SCNC: 140 MMOL/L (ref 136–145)

## 2024-11-07 PROCEDURE — 36415 COLL VENOUS BLD VENIPUNCTURE: CPT

## 2024-11-07 PROCEDURE — 80048 BASIC METABOLIC PNL TOTAL CA: CPT

## 2024-11-07 PROCEDURE — 83970 ASSAY OF PARATHORMONE: CPT

## 2024-11-07 PROCEDURE — 82306 VITAMIN D 25 HYDROXY: CPT

## 2024-11-08 LAB — PTH-INTACT SERPL-MCNC: 17.3 PG/ML (ref 18.5–88)

## 2024-11-12 ENCOUNTER — OFFICE VISIT (OUTPATIENT)
Dept: URGENT CARE | Age: 54
End: 2024-11-12
Payer: COMMERCIAL

## 2024-11-12 ENCOUNTER — TELEPHONE (OUTPATIENT)
Dept: NEPHROLOGY | Facility: CLINIC | Age: 54
End: 2024-11-12

## 2024-11-12 ENCOUNTER — APPOINTMENT (OUTPATIENT)
Dept: NEPHROLOGY | Facility: CLINIC | Age: 54
End: 2024-11-12
Payer: COMMERCIAL

## 2024-11-12 VITALS
OXYGEN SATURATION: 98 % | RESPIRATION RATE: 16 BRPM | SYSTOLIC BLOOD PRESSURE: 153 MMHG | DIASTOLIC BLOOD PRESSURE: 86 MMHG | WEIGHT: 219 LBS | HEIGHT: 69 IN | BODY MASS INDEX: 32.44 KG/M2 | HEART RATE: 93 BPM | TEMPERATURE: 98.1 F

## 2024-11-12 VITALS
BODY MASS INDEX: 32.56 KG/M2 | WEIGHT: 219.8 LBS | SYSTOLIC BLOOD PRESSURE: 159 MMHG | DIASTOLIC BLOOD PRESSURE: 82 MMHG | HEIGHT: 69 IN | HEART RATE: 96 BPM

## 2024-11-12 DIAGNOSIS — I10 ESSENTIAL HYPERTENSION: ICD-10-CM

## 2024-11-12 DIAGNOSIS — H66.005 RECURRENT ACUTE SUPPURATIVE OTITIS MEDIA WITHOUT SPONTANEOUS RUPTURE OF LEFT TYMPANIC MEMBRANE: Primary | ICD-10-CM

## 2024-11-12 DIAGNOSIS — N18.31 STAGE 3A CHRONIC KIDNEY DISEASE (MULTI): Primary | ICD-10-CM

## 2024-11-12 PROCEDURE — 3008F BODY MASS INDEX DOCD: CPT | Performed by: INTERNAL MEDICINE

## 2024-11-12 PROCEDURE — 3008F BODY MASS INDEX DOCD: CPT | Performed by: STUDENT IN AN ORGANIZED HEALTH CARE EDUCATION/TRAINING PROGRAM

## 2024-11-12 PROCEDURE — 1036F TOBACCO NON-USER: CPT | Performed by: INTERNAL MEDICINE

## 2024-11-12 PROCEDURE — 1036F TOBACCO NON-USER: CPT | Performed by: STUDENT IN AN ORGANIZED HEALTH CARE EDUCATION/TRAINING PROGRAM

## 2024-11-12 PROCEDURE — 3077F SYST BP >= 140 MM HG: CPT | Performed by: INTERNAL MEDICINE

## 2024-11-12 PROCEDURE — 99203 OFFICE O/P NEW LOW 30 MIN: CPT | Performed by: STUDENT IN AN ORGANIZED HEALTH CARE EDUCATION/TRAINING PROGRAM

## 2024-11-12 PROCEDURE — 3079F DIAST BP 80-89 MM HG: CPT | Performed by: INTERNAL MEDICINE

## 2024-11-12 PROCEDURE — 3077F SYST BP >= 140 MM HG: CPT | Performed by: STUDENT IN AN ORGANIZED HEALTH CARE EDUCATION/TRAINING PROGRAM

## 2024-11-12 PROCEDURE — 3079F DIAST BP 80-89 MM HG: CPT | Performed by: STUDENT IN AN ORGANIZED HEALTH CARE EDUCATION/TRAINING PROGRAM

## 2024-11-12 PROCEDURE — 99214 OFFICE O/P EST MOD 30 MIN: CPT | Performed by: INTERNAL MEDICINE

## 2024-11-12 RX ORDER — CEFUROXIME AXETIL 500 MG/1
500 TABLET ORAL 2 TIMES DAILY
Qty: 14 TABLET | Refills: 0 | Status: SHIPPED | OUTPATIENT
Start: 2024-11-12 | End: 2024-11-19

## 2024-11-12 RX ORDER — CEFUROXIME AXETIL 500 MG/1
500 TABLET ORAL 2 TIMES DAILY
Qty: 14 TABLET | Refills: 0 | Status: SHIPPED | OUTPATIENT
Start: 2024-11-12 | End: 2024-11-12

## 2024-11-12 ASSESSMENT — PAIN SCALES - GENERAL: PAINLEVEL_OUTOF10: 5

## 2024-11-12 NOTE — Clinical Note
We reviewed the patient's treatment history and agreed to prescribe Ceftin (cefuroxime) as previously given by ENT specialist for additional antimicrobial coverage however did advise consulting with ENT about this treatment plan versus other desired alternatives. Patient voiced full understanding and agreement to plan.

## 2024-11-12 NOTE — PATIENT INSTRUCTIONS
Follow up with ENT and PCP. We advised seeking immediate emergency medical attention if symptoms fail to improve, worsen or any concerning symptoms arise. Patient voiced full understanding and agreement to plan.

## 2024-11-12 NOTE — PROGRESS NOTES
"Subjective   Patient ID: Jamil Cisse \"Ryan" is a 54 y.o. male. They present today with a chief complaint of Earache (Left ear, has hx of ear infections in left ear. Was taking cipro drops, took 7 day dose and completed. Still having pain x 2.5 weeks).    History of Present Illness  Jamil \"Ryan" is a very pleasant 54-year-old male who presents to the urgent care for evaluation of left ear pain for about 2-1/2 weeks duration.  Patient of note has a history of Crohn's disease and chronic kidney disease as well as extensive history of recurrent left ear infections with reconstructive surgery.  Patient follows closely with specialist for this however states he was told he will not be seen for several weeks by the specialist regarding his current presentation and was advised to use his already prescribed Ciprodex drops.  Patient states he tried these drops for about 7 days and feels minimal improvement however still has lingering symptoms and has concerns that infection has not fully resolved.  Patient messaged his specialist today and was advised possible repeat treatment with Ceftin could be considered and therefore he presents today to the urgent care for this treatment option.  Patient denies fever, upper respiratory symptoms, trauma or injury or active drainage from the ear.  Patient is seeking evaluation reassurance.  Patient states initial ear drainage has resolved since using his already prescribed Ciprodex drops.  No other symptoms or concerns otherwise reported.    Past Medical History  Allergies as of 11/12/2024 - Reviewed 11/12/2024   Allergen Reaction Noted    Adhesive tape-silicones Rash and Other 04/27/2023    Codeine Other 04/27/2023    Sulfa (sulfonamide antibiotics) Other 04/27/2023    Adhesive Other and Unknown 10/10/2012    Amlodipine besylate Rash 03/13/2017    Lisinopril Other 03/14/2012    Sumatriptan Other 03/14/2012    Valsartan Other 03/14/2012       (Not in a hospital admission)       Past " Medical History:   Diagnosis Date    Abnormal ECG 09/16/2022    Normal sinus rhythm Nonspecific T wave abnormality    Arthritis     Bicuspid aortic valve     Cholesteatoma of middle ear and mastoid     CKD (chronic kidney disease)     stage 3, seen by Dr. Kemp (Nephology)    Dilated aortic root (CMS-[facility])     Minimally dilated aortic root 39 mm.    Diverticulosis     GERD (gastroesophageal reflux disease)     Gout     Heart murmur     Echo scheduled for 02/29/24    History of PFTs 06/16/2023    Dyspnea; Mild restriction on PFTs    HL (hearing loss)     Hyperlipidemia     Hypertension     Lung nodules     Nonspecific 1.9 cm right upper lobe part solid nodule with mediastinal lymphadenopathy    Mild aortic valve regurgitation     Obesity     Personal history of infections of the central nervous system 02/2018    History of Herpes Encephalitis    PONV (postoperative nausea and vomiting)     Reactive airway disease ([facility])     Renal cancer (Multi) 2003    s/p right radical nephrectomy in 2003.    Renal mass, left     Inadequately characterized 2.6 cm left renal mass. Consider further evaluation with renal US    Shortness of breath     seen by LJ Baptiste (pulmonology)    Sinusitis     Sleep apnea     Uses CPAP    Thoracic ascending aortic aneurysm (CMS-[facility]) 02/21/2024    4.3cm per CT scan on 02/21/24    Ulcerative colitis     Treated with Cimzia    Vision loss     Wears contacts       Past Surgical History:   Procedure Laterality Date    COLONOSCOPY      CT CHEST WO IV CONTRAST  02/20/2024    Nonspecific 1.9 cm right upper lobe part solid nodule with mediastinal lymphadenopathy.Ascending thoracic aortic aneurysm, 4.3 cm.    HERNIA REPAIR      NEPHRECTOMY Right 2005    Right total nephrectomy    OTHER SURGICAL HISTORY      Left Shoulder surgery    OTHER SURGICAL HISTORY  07/09/2020    Ear surgery x3    OTHER SURGICAL HISTORY  07/09/2020    Finger surgical procedure    OTHER SURGICAL HISTORY  07/09/2020     "Throat surgery    ROTATOR CUFF REPAIR Right 09/23/2022    Right rotator cuff repair with subacromial decompression    TOTAL HIP ARTHROPLASTY Bilateral 05/2017    Bilateral Hip replacement    US CAROTID DOPPLER LEFT  2018    NO HEMODYNAMICALLY SIGNIFICANT VASCULAR STENOSIS IN THE CAROTID AND VERTEBRAL ARTERIES IN THE NECK    VASECTOMY          reports that he has quit smoking. His smoking use included cigarettes. He has a 6 pack-year smoking history. He has never been exposed to tobacco smoke. He has never used smokeless tobacco. He reports current alcohol use of about 4.0 standard drinks of alcohol per week. He reports that he does not use drugs.    Review of Systems  A 10-point review of systems was performed, otherwise unremarkable unless stated in the history of present illness.                Objective    Vitals:    11/12/24 1527   BP: 153/86   BP Location: Left arm   Patient Position: Sitting   BP Cuff Size: Large adult   Pulse: 93   Resp: 16   Temp: 36.7 °C (98.1 °F)   TempSrc: Oral   SpO2: 98%   Weight: 99.3 kg (219 lb)   Height: 1.753 m (5' 9\")     No LMP for male patient.    Gen: Vitals noted and reviewed, no evidence of acute distress, well developed and afebrile.   Psych: Mood and affect appropriate for setting.  Head/Face: Atraumatic and normocephalic.   Neuro: No focal deficits noted.  ENT: TM on the left with exudative effusion and surrounding erythema without evidence of rupture, the right TM is clear otherwise, EACs unremarkable bilaterally. Mastoids non-tender bilaterally. Posterior oropharynx without erythema, exudate, or swelling. Uvula is in the midline and non-edematous.   Neck: Supple. No meningismus through full range of motion. No lymphadenopathy.   Cardiac: Regular rate and rhythm no murmur.   Lungs: Clear to auscultation throughout, No evidence of wheezing, rhonchi or crackles. Good aeration throughout.   Extremities: Symmetrical, No peripheral edema  Skin: Well healed scar behind the left " han of ear, without evidence of ecchymosis, open wounds, or rashes.      Point of Care Test & Imaging Results from this visit  No results found for this visit on 11/12/24.   No results found.    Diagnostic study results (if any) were reviewed by India Pickett DO.    Assessment/Plan   Allergies, medications, history, and pertinent labs/EKGs/Imaging reviewed by India Pickett DO.     Medical Decision Making  Discussed with the patient symptoms and clinical presentation findings are suggestive of an exudative middle ear otitis media with effusion without evidence of perforation in the setting of a history of complex recurrent otitis media's in the past and reconstructive surgery on that side.  We extensively reviewed risks associated with these infections in the setting of prior surgical repair and hardware placement and advised closely monitoring symptoms and following up with established ENT specialist.  We advised the patient to seek immediate emergency medical attention if fever develops, symptoms fail to improve, worsen or any concerning symptoms arise. We reviewed the patient's treatment history and agreed to prescribe Ceftin (cefuroxime) as previously given by ENT specialist for additional antimicrobial coverage however did advise consulting with ENT about this treatment plan versus other desired alternatives. Patient voiced full understanding and agreement to plan.    We discussed with the patient our clinical thoughts at this time given the above findings and clinical assessment and we had a shared decision-making conversation in a patient-centered decision-making model on how to proceed forward. The patient was instructed on the importance of a close follow-up with PCP and other care providers. The patient was also advised that an Urgent care diagnosis is often a preliminary impression and that definitive care is often not able to be given completley in the Urgent care setting.        Orders and  Diagnoses  Diagnoses and all orders for this visit:  Recurrent acute suppurative otitis media without spontaneous rupture of left tympanic membrane  -     cefuroxime (Ceftin) 500 mg tablet; Take 1 tablet (500 mg) by mouth 2 times a day for 7 days.      Medical Admin Record      Patient disposition: Home    Electronically signed by India Pickett DO  3:57 PM

## 2024-11-12 NOTE — PROGRESS NOTES
Jamil NACHO StrongBetito   54 y.o.    @@  [facility]/Room: [id number]/Room/bed info not found    Subjective:   The patient is being seen for a routine clinic follow-up of chronic kidney disease. Recently, the disease has been stable. Disease complications:  No hyperkalemia, no hypocalcemia, no hyperphosphatemia, no metabolic acidosis, no coagulopathy, no uremic encephalopathy, no neuropathy and no renal osteodystrophy. The patient is currently asymptomatic. No associated symptoms are reported.       Meds:   Current Outpatient Medications   Medication Sig Dispense Refill    aspirin 81 mg EC tablet Take 1 tablet (81 mg) by mouth once daily. 90 tablet 0    aspirin-acetaminophen-caffeine (Excedrin Extra Strength) 250-250-65 mg tablet Take 1 tablet by mouth every 6 hours if needed.      atorvastatin (Lipitor) 20 mg tablet Take 1 tablet (20 mg) by mouth once daily. 90 tablet 0    certolizumab pegol (Cimzia Powder for Reconst) injection Inject 1 mL (200 mg) under the skin every 14 (fourteen) days.      cetirizine (ZyrTEC) 10 mg tablet Take by mouth once daily.      ciprofloxacin-dexamethasone (CiproDEX) otic suspension Instill 4 drops to affected ear twice daily 7.5 mL 0    clindamycin (Cleocin) 300 mg capsule TAKE 2 CAPSULES BY MOUTH ONE HOUR BEFORE APPOINTMENT      colchicine 0.6 mg tablet Take 1 tablet (0.6 mg) by mouth once daily. 90 tablet 0    febuxostat (Uloric) 40 mg tablet Take 1 tablet (40 mg) by mouth once daily. 90 tablet 0    fluticasone (Flonase) 50 mcg/actuation nasal spray Administer 2 sprays into each nostril once daily. 48 g 0    Maxalt-MLT 10 mg disintegrating tablet Take 1 tablet (10 mg) by mouth 1 time if needed for migraine. May repeat in 2 hours if unresolved. Do not exceed 30 mg in 24 hours. 30 tablet 0    montelukast (Singulair) 10 mg tablet Take 1 tablet (10 mg) by mouth once daily at bedtime. 90 tablet 1    omeprazole (PriLOSEC) 40 mg DR capsule Take 1 capsule (40 mg) by mouth once daily. 90 capsule 0     sildenafil (Viagra) 100 mg tablet Take 1 tablet (100 mg) by mouth once daily as needed for erectile dysfunction. 30 tablet 0    telmisartan-hydrochlorothiazid (MIcarDIS HCT) 80-25 mg tablet Take 1 tablet by mouth once daily. 90 tablet 0    Xiidra 5 % dropperette INSTILL 1 DROP IN BOTH EYES TWICE A DAY       No current facility-administered medications for this visit.          ROS:  The patient is awake and oriented. No dizziness or lightheadedness. No chills and no fever. No headaches. No nausea and no vomiting. No shortness of breath. No cough. No sputum. No chest pain. No chest tightness. No abdominal pain. No diarrhea and no constipation. No hematemesis or hemoptysis. No hematuria. No rectal bleeding. No melena. No epistaxis. No urinary symptoms. No flank pain. No leg edema. No leg pain. No weakness. No itching. Overall, the rest of the review of systems is also negative.  12 point review of systems otherwise negative as stated in HPI.        Physical Examination:        Vitals:    11/12/24 1459   BP: 159/82   Pulse: 96     General: The patient is awake, oriented, and is not in any distress.  Head and Neck: Normocephalic. No periorbital edema.  Eyes: non-icteric  Respiratory: Symmetric air entry. Symmetric chest expansion.No respiratory distress.  Skin: No maculopapular rash.  Musculoskeletal: No peripheral edema in both left and right upper extremities.  No edema in either left or right lower extremities.  Neuro Exam: Speech is fluent. Moves extremities.    Imaging:  === 02/29/24 ===    US RENAL COMPLETE    - Impression -  Previous right nephrectomy.    No left hydronephrosis.    No focal urinary bladder abnormality identified.    MACRO:  None.    Signed by: Balaji Robledo 3/1/2024 2:09 PM  Dictation workstation:   [id number]       Blood Labs:  No results found for this or any previous visit (from the past 24 hours).   Lab Results   Component Value Date    PTH 17.3 (L) 11/07/2024    PROTUR NEGATIVE  09/16/2022      Lab Results   Component Value Date    GLUCOSE 70 (L) 11/07/2024    CALCIUM 10.2 11/07/2024     11/07/2024    K 4.1 11/07/2024    CO2 30 11/07/2024     11/07/2024    BUN 21 11/07/2024    CREATININE 1.48 (H) 11/07/2024         Assessment and Plan:  #1 chronic kidney disease stage III.  Last creatinine was 1.48.  He has history of renal cell carcinoma status post right radical nephrectomy back in 2003.  No major change in creatinine level over the past few years.  Normal potassium and bicarb level.  Normal PTH and vitamin D level.    2.  Hypertension.  Blood pressure is on high side.  Usually his blood pressure is under control.  If blood pressure stays high I will adjust his medications.  I instructed him to check his blood pressure regularly at home.  We also talked about low-salt diet.    I will see him in about 4 months for follow-up.        Tien Kemp MD  Senior Attending Physician  Director of Onco-Nephrology Program  Division of Nephrology & Hypertension  [facility]

## 2024-11-12 NOTE — TELEPHONE ENCOUNTER
----- Message from Tien Kemp sent at 11/8/2024  1:28 PM EST -----  No major change in kidney function.

## 2024-11-25 ENCOUNTER — OFFICE VISIT (OUTPATIENT)
Dept: URGENT CARE | Age: 54
End: 2024-11-25
Payer: COMMERCIAL

## 2024-11-25 VITALS
OXYGEN SATURATION: 98 % | HEIGHT: 69 IN | RESPIRATION RATE: 20 BRPM | DIASTOLIC BLOOD PRESSURE: 87 MMHG | BODY MASS INDEX: 32.58 KG/M2 | SYSTOLIC BLOOD PRESSURE: 135 MMHG | HEART RATE: 78 BPM | TEMPERATURE: 97.8 F | WEIGHT: 220 LBS

## 2024-11-25 DIAGNOSIS — C64.1 MALIGNANT NEOPLASM OF RIGHT KIDNEY (MULTI): ICD-10-CM

## 2024-11-25 DIAGNOSIS — H66.005 RECURRENT ACUTE SUPPURATIVE OTITIS MEDIA WITHOUT SPONTANEOUS RUPTURE OF LEFT TYMPANIC MEMBRANE: Primary | ICD-10-CM

## 2024-11-25 DIAGNOSIS — H71.22 CHOLESTEATOMA OF LEFT MIDDLE EAR AND MASTOID: ICD-10-CM

## 2024-11-25 RX ORDER — CEFDINIR 300 MG/1
300 CAPSULE ORAL 2 TIMES DAILY
Qty: 20 CAPSULE | Refills: 0 | Status: SHIPPED | OUTPATIENT
Start: 2024-11-25 | End: 2024-12-05

## 2024-11-25 RX ORDER — CIPROFLOXACIN AND DEXAMETHASONE 3; 1 MG/ML; MG/ML
4 SUSPENSION/ DROPS AURICULAR (OTIC) 2 TIMES DAILY
Qty: 37.5 ML | Refills: 0 | Status: SHIPPED | OUTPATIENT
Start: 2024-11-25 | End: 2024-11-25 | Stop reason: ENTERED-IN-ERROR

## 2024-11-25 RX ORDER — CIPROFLOXACIN AND DEXAMETHASONE 3; 1 MG/ML; MG/ML
4 SUSPENSION/ DROPS AURICULAR (OTIC) 2 TIMES DAILY
Qty: 37.5 ML | Refills: 0 | Status: SHIPPED | OUTPATIENT
Start: 2024-11-25

## 2024-11-25 ASSESSMENT — ENCOUNTER SYMPTOMS
HEMATOLOGIC/LYMPHATIC NEGATIVE: 1
MUSCULOSKELETAL NEGATIVE: 1
PSYCHIATRIC NEGATIVE: 1
NEUROLOGICAL NEGATIVE: 1
ENDOCRINE NEGATIVE: 1
EYES NEGATIVE: 1
CONSTITUTIONAL NEGATIVE: 1
ALLERGIC/IMMUNOLOGIC NEGATIVE: 1
PALPITATIONS: 0
GASTROINTESTINAL NEGATIVE: 1
RESPIRATORY NEGATIVE: 1

## 2024-11-25 ASSESSMENT — PAIN SCALES - GENERAL: PAINLEVEL_OUTOF10: 4

## 2024-11-25 NOTE — PROGRESS NOTES
"Subjective   Patient ID: Jamil Cisse \"Mau\" is a 54 y.o. male. They present today with a chief complaint of Earache (Left ear pain. Treated on 11/12/24 and finished antibiotic 6 days ago. ).    History of Present Illness    Earache   Associated symptoms include ear discharge.     Pt presents to urgent care with c/o  L ear pain with drainage for the past few days.  Pt was treated in this UC on 11/12/24 for the same issue.  He was supposed to follow up with ENT but states he cannot get in to see them for 3-4 months.  States he is out of his Rx Ciprodex drops .  Pt denies CP, SOB, palpitations, fevers, abd pain, n/v/d, sick contacts, recent travel.          Past Medical History  Allergies as of 11/25/2024 - Reviewed 11/25/2024   Allergen Reaction Noted    Adhesive tape-silicones Rash and Other 04/27/2023    Codeine Other 04/27/2023    Sulfa (sulfonamide antibiotics) Other 04/27/2023    Adhesive Other and Unknown 10/10/2012    Amlodipine besylate Rash 03/13/2017    Lisinopril Other 03/14/2012    Sumatriptan Other 03/14/2012    Valsartan Other 03/14/2012       (Not in a hospital admission)       Past Medical History:   Diagnosis Date    Abnormal ECG 09/16/2022    Normal sinus rhythm Nonspecific T wave abnormality    Arthritis     Bicuspid aortic valve     Cholesteatoma of middle ear and mastoid     CKD (chronic kidney disease)     stage 3, seen by Dr. Kemp (Nephology)    Dilated aortic root (CMS-[facility])     Minimally dilated aortic root 39 mm.    Diverticulosis     GERD (gastroesophageal reflux disease)     Gout     Heart murmur     Echo scheduled for 02/29/24    History of PFTs 06/16/2023    Dyspnea; Mild restriction on PFTs    HL (hearing loss)     Hyperlipidemia     Hypertension     Lung nodules     Nonspecific 1.9 cm right upper lobe part solid nodule with mediastinal lymphadenopathy    Mild aortic valve regurgitation     Obesity     Personal history of infections of the central nervous system 02/2018    History " of Herpes Encephalitis    PONV (postoperative nausea and vomiting)     Reactive airway disease ([facility])     Renal cancer (Multi) 2003    s/p right radical nephrectomy in 2003.    Renal mass, left     Inadequately characterized 2.6 cm left renal mass. Consider further evaluation with renal US    Shortness of breath     seen by JACK Baptiste-CNP (pulmonology)    Sinusitis     Sleep apnea     Uses CPAP    Thoracic ascending aortic aneurysm (CMS-HCC) 02/21/2024    4.3cm per CT scan on 02/21/24    Ulcerative colitis     Treated with Cimzia    Vision loss     Wears contacts       Past Surgical History:   Procedure Laterality Date    COLONOSCOPY      CT CHEST WO IV CONTRAST  02/20/2024    Nonspecific 1.9 cm right upper lobe part solid nodule with mediastinal lymphadenopathy.Ascending thoracic aortic aneurysm, 4.3 cm.    HERNIA REPAIR      NEPHRECTOMY Right 2005    Right total nephrectomy    OTHER SURGICAL HISTORY      Left Shoulder surgery    OTHER SURGICAL HISTORY  07/09/2020    Ear surgery x3    OTHER SURGICAL HISTORY  07/09/2020    Finger surgical procedure    OTHER SURGICAL HISTORY  07/09/2020    Throat surgery    ROTATOR CUFF REPAIR Right 09/23/2022    Right rotator cuff repair with subacromial decompression    TOTAL HIP ARTHROPLASTY Bilateral 05/2017    Bilateral Hip replacement    US CAROTID DOPPLER LEFT  2018    NO HEMODYNAMICALLY SIGNIFICANT VASCULAR STENOSIS IN THE CAROTID AND VERTEBRAL ARTERIES IN THE NECK    VASECTOMY          reports that he has quit smoking. His smoking use included cigarettes. He has a 6 pack-year smoking history. He has never been exposed to tobacco smoke. He has never used smokeless tobacco. He reports current alcohol use of about 4.0 standard drinks of alcohol per week. He reports that he does not use drugs.    Review of Systems  Review of Systems   Constitutional: Negative.    HENT:  Positive for ear discharge and ear pain.    Eyes: Negative.    Respiratory: Negative.    "  Cardiovascular:  Negative for chest pain and palpitations.   Gastrointestinal: Negative.    Endocrine: Negative.    Genitourinary: Negative.    Musculoskeletal: Negative.    Skin: Negative.    Allergic/Immunologic: Negative.    Neurological: Negative.    Hematological: Negative.    Psychiatric/Behavioral: Negative.     All other systems reviewed and are negative.                                 Objective    Vitals:    11/25/24 1138   BP: 135/87   BP Location: Right arm   Patient Position: Sitting   BP Cuff Size: Large adult   Pulse: 78   Resp: 20   Temp: 36.6 °C (97.8 °F)   TempSrc: Temporal   SpO2: 98%   Weight: 99.8 kg (220 lb)   Height: 1.753 m (5' 9\")     No LMP for male patient.    Physical Exam  Vitals and nursing note reviewed.   Constitutional:       General: He is not in acute distress.     Appearance: Normal appearance. He is not ill-appearing or toxic-appearing.   HENT:      Head: Atraumatic.      Right Ear: Tympanic membrane, ear canal and external ear normal.      Left Ear: External ear normal. Drainage and tenderness present. A middle ear effusion is present.      Nose: Nose normal.      Mouth/Throat:      Mouth: Mucous membranes are moist.      Pharynx: Oropharynx is clear. No oropharyngeal exudate or posterior oropharyngeal erythema.   Eyes:      Extraocular Movements: Extraocular movements intact.      Conjunctiva/sclera: Conjunctivae normal.      Pupils: Pupils are equal, round, and reactive to light.   Cardiovascular:      Rate and Rhythm: Normal rate and regular rhythm.      Pulses: Normal pulses.      Heart sounds: Normal heart sounds.   Pulmonary:      Effort: Pulmonary effort is normal.      Breath sounds: Normal breath sounds.   Abdominal:      General: Abdomen is flat. Bowel sounds are normal.      Palpations: Abdomen is soft.      Tenderness: There is no abdominal tenderness.   Musculoskeletal:         General: Normal range of motion.      Cervical back: Normal range of motion and neck " supple. No tenderness.   Skin:     General: Skin is warm and dry.      Capillary Refill: Capillary refill takes less than 2 seconds.   Neurological:      General: No focal deficit present.      Mental Status: He is alert and oriented to person, place, and time.   Psychiatric:         Mood and Affect: Mood normal.         Behavior: Behavior normal.         Thought Content: Thought content normal.         Procedures    Point of Care Test & Imaging Results from this visit  No results found for this visit on 11/25/24.   No results found.    Diagnostic study results (if any) were reviewed by LJ Ruiz.    Assessment/Plan   Allergies, medications, history, and pertinent labs/EKGs/Imaging reviewed by LJ Ruiz.     Medical Decision Making  Pt presents with L ear pain, drainage.  Pt is s/p Tympanoplasty; Mastoidectomy with Ossiculoplasty in June of this year.  Recently treated in this UC for infection with Rx Ceftin x 1 week. Pt states he was taking Ceftin with Ciprodex drops and symptoms seemed to get a little better but since stopping the antibiotics, symptoms have returned.  States his PCP will usually treat with 10 day supply of antibiotics.  Pt strongly advised that he must follow up with ENT given recurrent infections s/p surgical procedure.  Will place urgent referral.  Will give pt Rx omnicef x 10 days and Ciprodex drops.  Advised to follow up with ENT ASAP.  At time of discharge patient was clinically well-appearing and HDS for outpatient management. The patient was educated regarding diagnosis, supportive care, OTC and Rx medications. The patient was given the opportunity to ask questions prior to discharge.  They verbalized understanding of my discussion of the plans for treatment, expected course, indications to return to UC or seek further evaluation in ED, and the need for timely follow up as directed.        Orders and Diagnoses  Diagnoses and all orders for this  visit:  Recurrent acute suppurative otitis media without spontaneous rupture of left tympanic membrane  -     cefdinir (Omnicef) 300 mg capsule; Take 1 capsule (300 mg) by mouth 2 times a day for 10 days.  -     Referral to ENT; Future  -     ciprofloxacin-dexamethasone (Ciprodex) otic suspension; Administer 4 drops into the left ear 2 times a day.  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

## 2024-11-26 ENCOUNTER — APPOINTMENT (OUTPATIENT)
Dept: ALLERGY | Facility: CLINIC | Age: 54
End: 2024-11-26
Payer: COMMERCIAL

## 2024-11-26 DIAGNOSIS — J30.2 SEASONAL ALLERGIES: ICD-10-CM

## 2024-11-26 PROCEDURE — 95117 IMMUNOTHERAPY INJECTIONS: CPT | Performed by: ALLERGY & IMMUNOLOGY

## 2024-12-02 DIAGNOSIS — I10 HTN (HYPERTENSION), BENIGN: ICD-10-CM

## 2024-12-02 RX ORDER — TELMISARTAN AND HYDROCHLORTHIAZIDE 80; 25 MG/1; MG/1
1 TABLET ORAL DAILY
Qty: 90 TABLET | Refills: 0 | Status: SHIPPED | OUTPATIENT
Start: 2024-12-02

## 2024-12-09 DIAGNOSIS — I10 HTN (HYPERTENSION), BENIGN: ICD-10-CM

## 2024-12-09 RX ORDER — ASPIRIN 81 MG/1
81 TABLET ORAL DAILY
Qty: 90 TABLET | Refills: 0 | Status: SHIPPED | OUTPATIENT
Start: 2024-12-09

## 2024-12-15 ENCOUNTER — OFFICE VISIT (OUTPATIENT)
Dept: URGENT CARE | Age: 54
End: 2024-12-15
Payer: COMMERCIAL

## 2024-12-15 VITALS
OXYGEN SATURATION: 98 % | DIASTOLIC BLOOD PRESSURE: 83 MMHG | TEMPERATURE: 98 F | SYSTOLIC BLOOD PRESSURE: 157 MMHG | RESPIRATION RATE: 20 BRPM | BODY MASS INDEX: 32.88 KG/M2 | WEIGHT: 222 LBS | HEIGHT: 69 IN | HEART RATE: 78 BPM

## 2024-12-15 DIAGNOSIS — H66.3X2 CHRONIC SUPPURATIVE OTITIS MEDIA OF LEFT EAR, UNSPECIFIED OTITIS MEDIA LOCATION: Primary | ICD-10-CM

## 2024-12-15 PROCEDURE — 3079F DIAST BP 80-89 MM HG: CPT | Performed by: PHYSICIAN ASSISTANT

## 2024-12-15 PROCEDURE — 3077F SYST BP >= 140 MM HG: CPT | Performed by: PHYSICIAN ASSISTANT

## 2024-12-15 PROCEDURE — 3008F BODY MASS INDEX DOCD: CPT | Performed by: PHYSICIAN ASSISTANT

## 2024-12-15 PROCEDURE — 99214 OFFICE O/P EST MOD 30 MIN: CPT | Performed by: PHYSICIAN ASSISTANT

## 2024-12-15 RX ORDER — AMOXICILLIN AND CLAVULANATE POTASSIUM 875; 125 MG/1; MG/1
1 TABLET, FILM COATED ORAL 2 TIMES DAILY
Qty: 14 TABLET | Refills: 0 | Status: SHIPPED | OUTPATIENT
Start: 2024-12-15 | End: 2024-12-22

## 2024-12-15 RX ORDER — PREDNISONE 10 MG/1
10 TABLET ORAL
Qty: 6 TABLET | Refills: 0 | Status: SHIPPED | OUTPATIENT
Start: 2024-12-15 | End: 2024-12-18

## 2024-12-15 NOTE — PROGRESS NOTES
"Subjective   Patient ID: Jamil Cisse \"Mau\" is a 54 y.o. male who presents for Earache (L ear pain x 5 days after stopping 10 day antibiotic, recurrent ear infection L ear x 2m, pain is described as sharp, ear is itchy and leaks yellow fluid ) and Nausea.  HPI  Patient presents for left recurrent supportive otitis media.  Patient has a long history of this and has reportedly undergone 4 separate surgeries and management of this.  Most recently, this started over the past week but has had issues with this over the past 2 months.  No fever or chills.  No other complaints.    Review of Systems    Constitutional:  See HPI   ENT: See HPI    Neurologic:  Alert and oriented X4, No numbness, No tingling.    All other systems are negative     Objective     /83   Pulse 78   Temp 36.7 °C (98 °F)   Resp 20   Ht 1.753 m (5' 9\")   Wt 101 kg (222 lb)   SpO2 98%   BMI 32.78 kg/m²     Physical Exam    General:  Alert and oriented, No acute distress.    Eye:  Pupils are equal, round and reactive to light, Normal conjunctiva.    HENT:  Normocephalic, left tympanic membrane is nearly completely obscured by drainage though what was visible was white/opaque  Neck:  Supple    Respiratory: Respirations are non-labored   Musculoskeletal: Normal ROM and strength  Integumentary:  Warm, Dry, Intact, No pallor, No rash.    Neurologic:  Alert, Oriented, Normal sensory, Cranial Nerves II-XII are grossly intact  Psychiatric:  Cooperative, Appropriate mood & affect.    Assessment/Plan   Exam is consistent with acute on chronic supportive left otitis media.  Prescriptions for Augmentin and low-dose prednisone.  Patient's clinical presentation is otherwise unremarkable at this time. Patient is discharged with instructions to follow-up with primary care or seek emergency medical attention for worsening symptoms or any new concerns.  Problem List Items Addressed This Visit    None  Visit Diagnoses       Chronic suppurative otitis media " of left ear, unspecified otitis media location    -  Primary    Relevant Medications    amoxicillin-pot clavulanate (Augmentin) 875-125 mg tablet    predniSONE (Deltasone) 10 mg tablet            Final diagnoses:   [H66.3X2] Chronic suppurative otitis media of left ear, unspecified otitis media location

## 2024-12-16 DIAGNOSIS — K21.9 GASTROESOPHAGEAL REFLUX DISEASE WITHOUT ESOPHAGITIS: ICD-10-CM

## 2024-12-16 DIAGNOSIS — J30.9 ALLERGIC RHINITIS, UNSPECIFIED SEASONALITY, UNSPECIFIED TRIGGER: ICD-10-CM

## 2024-12-16 DIAGNOSIS — E78.2 HYPERLIPEMIA, MIXED: ICD-10-CM

## 2024-12-16 RX ORDER — ATORVASTATIN CALCIUM 20 MG/1
20 TABLET, FILM COATED ORAL DAILY
Qty: 90 TABLET | Refills: 0 | Status: SHIPPED | OUTPATIENT
Start: 2024-12-16

## 2024-12-16 RX ORDER — OMEPRAZOLE 40 MG/1
40 CAPSULE, DELAYED RELEASE ORAL DAILY
Qty: 90 CAPSULE | Refills: 0 | Status: SHIPPED | OUTPATIENT
Start: 2024-12-16

## 2024-12-16 RX ORDER — FLUTICASONE PROPIONATE 50 MCG
2 SPRAY, SUSPENSION (ML) NASAL DAILY
Qty: 48 G | Refills: 0 | Status: SHIPPED | OUTPATIENT
Start: 2024-12-16

## 2024-12-17 ENCOUNTER — APPOINTMENT (OUTPATIENT)
Dept: ALLERGY | Facility: CLINIC | Age: 54
End: 2024-12-17
Payer: COMMERCIAL

## 2024-12-17 DIAGNOSIS — J30.2 SEASONAL ALLERGIES: ICD-10-CM

## 2024-12-17 PROCEDURE — 95117 IMMUNOTHERAPY INJECTIONS: CPT | Performed by: ALLERGY & IMMUNOLOGY

## 2024-12-30 DIAGNOSIS — M1A.9XX0 CHRONIC GOUT WITHOUT TOPHUS, UNSPECIFIED CAUSE, UNSPECIFIED SITE: ICD-10-CM

## 2024-12-30 DIAGNOSIS — J30.1 SEASONAL ALLERGIC RHINITIS DUE TO POLLEN: Primary | ICD-10-CM

## 2024-12-30 RX ORDER — FEBUXOSTAT 40 MG/1
40 TABLET, FILM COATED ORAL DAILY
Qty: 90 TABLET | Refills: 0 | Status: SHIPPED | OUTPATIENT
Start: 2024-12-30

## 2025-01-01 NOTE — PROGRESS NOTES
"History of present illness:  Mau Cisse is a 54 y.o. male presenting today for follow up. Patient continues to have intermittent drainage from his left ear. He has tried several courses of antibiotics, systemic and topical. It appears that those do help temporarily, but his symptoms recur after a period of time. He has tried even amoxicillin, which he thinks has helped the most. Hearing remains good.     RECALL 10/7/2024  Jamil Cisse 54 y.o. male  presenting today for follow-up. He is status post left tympanoplasty,mastoidectomy, ossiculoplasty (TORP) by Dr Jamil Brizuela. The surgery was in June 2024. The patient reports a recurrence of pain and drainage approximately two days ago. There is significant subjective improvement in hearing.     The patient's current medications, active allergies and list of medical problems were reviewed in the EHR and confirmed electronically there are as follows;    Allergies:     Allergies   Allergen Reactions    Adhesive Tape-Silicones Rash and Other     skin pulled off    Codeine Other     \"Skin got so hot\"    Sulfa (Sulfonamide Antibiotics) Other    Adhesive Other and Unknown     Pulls skin off    Amlodipine Besylate Rash     Flush    Lisinopril Other     ineffective    Sumatriptan Other     ineffective    Valsartan Other     ineffective     Current list of medications:   Current Outpatient Medications   Medication Sig Dispense Refill    aspirin 81 mg EC tablet Take 1 tablet (81 mg) by mouth once daily. 90 tablet 0    aspirin-acetaminophen-caffeine (Excedrin Extra Strength) 250-250-65 mg tablet Take 1 tablet by mouth every 6 hours if needed.      atorvastatin (Lipitor) 20 mg tablet Take 1 tablet (20 mg) by mouth once daily. 90 tablet 0    certolizumab pegol (Cimzia Powder for Reconst) injection Inject 1 mL (200 mg) under the skin every 14 (fourteen) days.      cetirizine (ZyrTEC) 10 mg tablet Take by mouth once daily.      ciprofloxacin-dexamethasone (CiproDEX) otic " suspension Instill 4 drops to affected ear twice daily 7.5 mL 0    ciprofloxacin-dexamethasone (Ciprodex) otic suspension Administer 4 drops into the left ear 2 times a day. 37.5 mL 0    clindamycin (Cleocin) 300 mg capsule TAKE 2 CAPSULES BY MOUTH ONE HOUR BEFORE APPOINTMENT      colchicine 0.6 mg tablet Take 1 tablet (0.6 mg) by mouth once daily. 90 tablet 0    febuxostat (Uloric) 40 mg tablet Take 1 tablet (40 mg) by mouth once daily. 90 tablet 0    fluticasone (Flonase) 50 mcg/actuation nasal spray Administer 2 sprays into each nostril once daily. 48 g 0    Maxalt-MLT 10 mg disintegrating tablet Take 1 tablet (10 mg) by mouth 1 time if needed for migraine. May repeat in 2 hours if unresolved. Do not exceed 30 mg in 24 hours. 30 tablet 0    montelukast (Singulair) 10 mg tablet Take 1 tablet (10 mg) by mouth once daily at bedtime. 90 tablet 1    omeprazole (PriLOSEC) 40 mg DR capsule Take 1 capsule (40 mg) by mouth once daily. 90 capsule 0    sildenafil (Viagra) 100 mg tablet Take 1 tablet (100 mg) by mouth once daily as needed for erectile dysfunction. 30 tablet 0    telmisartan-hydrochlorothiazid (MIcarDIS HCT) 80-25 mg tablet Take 1 tablet by mouth once daily. 90 tablet 0    Xiidra 5 % dropperette INSTILL 1 DROP IN BOTH EYES TWICE A DAY       No current facility-administered medications for this visit.     Problem list:  Patient Active Problem List   Diagnosis    Allergic rhinitis    Cervical radiculitis    Crohn's disease (Multi)    Erythrocytosis    GERD (gastroesophageal reflux disease)    Gout    Hip pain    History of total hip replacement    HTN (hypertension)    Hyperlipemia, mixed    Migraine headache    Nontraumatic incomplete tear of right rotator cuff    Osteoarthritis of hip    Osteoarthritis of spine with radiculopathy, cervical region    Rotator cuff tear    Skin tag of perianal region    Stage 3a chronic kidney disease (Multi)    Personal history of renal cancer    WILFRID (obstructive sleep apnea)     Elevated LFTs    Excessive daytime sleepiness    Mixed hearing loss, bilateral    Shoulder pain    Snoring    Angioedema    Contusion of hip    Displacement of lumbar intervertebral disc without myelopathy    ED (erectile dysfunction)    Lumbago    Maxillary sinusitis    Reflux esophagitis    Ulcerative colitis    Degenerative arthritis of hip    Primary osteoarthritis of right hip    Class 1 obesity with serious comorbidity and body mass index (BMI) of 34.0 to 34.9 in adult    Tympanic membrane perforation    Nontraumatic rupture of muscle or tendon of rotator cuff of right shoulder    Hypercalcemia    History of encephalitis    Heart murmur    Gouty arthritis of toe    Elevation of levels of liver transaminase levels    Disorder of anus    Allergic conjunctivitis    Fatty liver    Cholesteatoma of middle ear and mastoid    Bicuspid aortic valve    Left ventricular hypertrophy    Seasonal allergies    Dilatation of thoracic aorta (CMS-HCC)    Chronic kidney disease    Encounter to discuss test results    Encounter for pre-operative cardiovascular clearance    Cholesteatoma of left mastoid    Aneurysm of ascending aorta without rupture (CMS-HCC)    Nonrheumatic aortic valve insufficiency    Lymphadenopathy    ILD (interstitial lung disease) (Multi)    Mediastinal lymphadenopathy    Abnormal CT of the chest    PONV (postoperative nausea and vomiting)         Physical Examination:  CONSTITUTIONAL:  No acute distress  VOICE:  No hoarseness or other abnormality  RESPIRATION:  Breathing comfortably, no stridor  CV:  No clubbing/cyanosis/edema in hands  EYES:  EOM intact, sclera clear  NEURO:  Alert and oriented times 3, Cranial nerves II-XII grossly intact and symmetric bilaterally  HEAD AND FACE:  Symmetric facial features, no masses or lesions  RIGHT EAR:  There is a small, dry posterior perforation. Evidence of myringosclerosis is noted, with no signs of cholesteatoma.  LEFT EAR: Mucopurulent drainage was suctioned  out using the tang-microscope, revealing evidence of myringitis in the posterosuperior part of the graft, cartilage seen. There is a small perforation and right above that perforation between the tympanic membrane and the attic area there is an epithelial inclusion dorcas, which I confirmed by probing that it is a recurrent cholesteatoma originating from the middle ear.  NOSE:  Anterior rhinoscopy clear, no significant external deformity.  ORAL CAVITY/OROPHARYNX/LIPS:  normal lining, mobile tongue.  PHARYNGEAL WALLS: Moist mucosal lining, good palatal elevation  NECK/LYMPH:  No LAD, no thyroid masses, trachea midline  SKIN:  Neck and facial skin is without scar or injury  PSYCH:  Alert and oriented with appropriate mood and affect    Impression:  Bilateral chronic otitis media   Left chronic myringitis  History of left cholesteatoma   Status post tympanoplasty, mastoidectomy and ossiculoplasty  Bilateral eustachian tube dysfunction   Recurrent left middle ear cholesteatoma   Left conductive hearing loss       Recommendation:  Will treat with mastoid powder for 14 days. He will return in 2-3 weeks for an audiogram. However, given the fact that he has a recurrent cholesteatoma, he will need a revision left tympanomastoidectomy. I discussed with him the procedure and possibility of a 2 staged surgery. He understands that hearing restoration cannot be guaranteed. He is willing to proceed.  Will schedule the procedure in the near future      I discussed with the patient the complexity of my medical decision making including the treatment and testing rational, indications of their elective procedure and possible adverse effects and/or complications. Based on the provided documentation and my professional assessment of this patient's new diagnosis of recurrent cholesteatoma and his chronic condition of chronic myringitis and otorrhea, the complexity of evaluation and treatment is moderate.    This note was created using  speech recognition transcription software. Despite proofreading, several typographical errors might be present that might affect the meaning of the content. Please call with any questions.      By signing my name below, ICrista Scribe attest that this documentation has been prepared under the direction and in the presence of Candis Bautista MD     Scribe Attestation  By signing my name below, IAnnamaria Scribe attest that this documentation has been prepared under the direction and in the presence of Candis Bautista MD.     normal (ped)...

## 2025-01-02 RX ORDER — MONTELUKAST SODIUM 10 MG/1
10 TABLET ORAL NIGHTLY
Qty: 90 TABLET | Refills: 1 | Status: SHIPPED | OUTPATIENT
Start: 2025-01-02

## 2025-01-03 ENCOUNTER — APPOINTMENT (OUTPATIENT)
Dept: OTOLARYNGOLOGY | Facility: CLINIC | Age: 55
End: 2025-01-03
Payer: COMMERCIAL

## 2025-01-03 VITALS — WEIGHT: 222 LBS | BODY MASS INDEX: 32.88 KG/M2 | HEIGHT: 69 IN

## 2025-01-03 DIAGNOSIS — H73.12 CHRONIC MYRINGITIS OF LEFT EAR: ICD-10-CM

## 2025-01-03 DIAGNOSIS — H66.3X2 OTHER CHRONIC SUPPURATIVE OTITIS MEDIA OF LEFT EAR: ICD-10-CM

## 2025-01-03 DIAGNOSIS — H71.22 CHOLESTEATOMA OF LEFT MIDDLE EAR AND MASTOID: ICD-10-CM

## 2025-01-03 DIAGNOSIS — Z01.818 PREOPERATIVE TESTING: ICD-10-CM

## 2025-01-03 PROBLEM — H66.90 CHRONIC OTITIS MEDIA: Status: ACTIVE | Noted: 2025-01-03

## 2025-01-03 PROCEDURE — 3008F BODY MASS INDEX DOCD: CPT | Performed by: OTOLARYNGOLOGY

## 2025-01-03 PROCEDURE — 1036F TOBACCO NON-USER: CPT | Performed by: OTOLARYNGOLOGY

## 2025-01-03 PROCEDURE — 87070 CULTURE OTHR SPECIMN AEROBIC: CPT

## 2025-01-03 PROCEDURE — 99214 OFFICE O/P EST MOD 30 MIN: CPT | Performed by: OTOLARYNGOLOGY

## 2025-01-03 PROCEDURE — 87075 CULTR BACTERIA EXCEPT BLOOD: CPT

## 2025-01-03 PROCEDURE — 87205 SMEAR GRAM STAIN: CPT

## 2025-01-05 LAB
BACTERIA SPEC CULT: NORMAL
GRAM STN SPEC: NORMAL
GRAM STN SPEC: NORMAL

## 2025-01-07 ENCOUNTER — LAB (OUTPATIENT)
Dept: LAB | Facility: LAB | Age: 55
End: 2025-01-07
Payer: COMMERCIAL

## 2025-01-07 ENCOUNTER — APPOINTMENT (OUTPATIENT)
Dept: OTOLARYNGOLOGY | Facility: CLINIC | Age: 55
End: 2025-01-07
Payer: COMMERCIAL

## 2025-01-07 DIAGNOSIS — K50.90 CROHN'S DISEASE, UNSPECIFIED, WITHOUT COMPLICATIONS (MULTI): Primary | ICD-10-CM

## 2025-01-07 DIAGNOSIS — I10 PRIMARY HYPERTENSION: ICD-10-CM

## 2025-01-07 DIAGNOSIS — M1A.9XX0 CHRONIC GOUT WITHOUT TOPHUS, UNSPECIFIED CAUSE, UNSPECIFIED SITE: ICD-10-CM

## 2025-01-07 DIAGNOSIS — E78.2 HYPERLIPEMIA, MIXED: ICD-10-CM

## 2025-01-07 DIAGNOSIS — R68.82 DECREASED LIBIDO: ICD-10-CM

## 2025-01-07 DIAGNOSIS — Z12.5 PROSTATE CANCER SCREENING: ICD-10-CM

## 2025-01-07 LAB
ALBUMIN SERPL BCP-MCNC: 4.4 G/DL (ref 3.4–5)
ALP SERPL-CCNC: 59 U/L (ref 33–120)
ALT SERPL W P-5'-P-CCNC: 28 U/L (ref 10–52)
ANION GAP SERPL CALC-SCNC: 11 MMOL/L (ref 10–20)
AST SERPL W P-5'-P-CCNC: 29 U/L (ref 9–39)
BILIRUB SERPL-MCNC: 0.6 MG/DL (ref 0–1.2)
BUN SERPL-MCNC: 19 MG/DL (ref 6–23)
CALCIUM SERPL-MCNC: 10 MG/DL (ref 8.6–10.3)
CHLORIDE SERPL-SCNC: 103 MMOL/L (ref 98–107)
CHOLEST SERPL-MCNC: 141 MG/DL (ref 0–199)
CHOLESTEROL/HDL RATIO: 3.8
CO2 SERPL-SCNC: 29 MMOL/L (ref 21–32)
CREAT SERPL-MCNC: 1.38 MG/DL (ref 0.5–1.3)
EGFRCR SERPLBLD CKD-EPI 2021: 60 ML/MIN/1.73M*2
GLUCOSE SERPL-MCNC: 79 MG/DL (ref 74–99)
HDLC SERPL-MCNC: 37.4 MG/DL
LDLC SERPL CALC-MCNC: 84 MG/DL
NON HDL CHOLESTEROL: 104 MG/DL (ref 0–149)
POTASSIUM SERPL-SCNC: 3.6 MMOL/L (ref 3.5–5.3)
PROT SERPL-MCNC: 7.9 G/DL (ref 6.4–8.2)
PSA SERPL-MCNC: 2.9 NG/ML
SODIUM SERPL-SCNC: 139 MMOL/L (ref 136–145)
TRIGL SERPL-MCNC: 96 MG/DL (ref 0–149)
TSH SERPL-ACNC: 0.83 MIU/L (ref 0.44–3.98)
URATE SERPL-MCNC: 4.5 MG/DL (ref 4–7.5)
VLDL: 19 MG/DL (ref 0–40)

## 2025-01-07 PROCEDURE — 80061 LIPID PANEL: CPT

## 2025-01-07 PROCEDURE — 84550 ASSAY OF BLOOD/URIC ACID: CPT

## 2025-01-07 PROCEDURE — 86481 TB AG RESPONSE T-CELL SUSP: CPT

## 2025-01-07 PROCEDURE — 84153 ASSAY OF PSA TOTAL: CPT

## 2025-01-07 PROCEDURE — 36415 COLL VENOUS BLD VENIPUNCTURE: CPT

## 2025-01-07 PROCEDURE — 84402 ASSAY OF FREE TESTOSTERONE: CPT

## 2025-01-07 PROCEDURE — 80053 COMPREHEN METABOLIC PANEL: CPT

## 2025-01-07 PROCEDURE — 84443 ASSAY THYROID STIM HORMONE: CPT

## 2025-01-11 LAB
TESTOSTERONE FREE (CHAN): 221.4 PG/ML (ref 35–155)
TESTOSTERONE,TOTAL,LC-MS/MS: 938 NG/DL (ref 250–1100)

## 2025-01-14 ENCOUNTER — APPOINTMENT (OUTPATIENT)
Dept: ALLERGY | Facility: CLINIC | Age: 55
End: 2025-01-14
Payer: COMMERCIAL

## 2025-01-14 DIAGNOSIS — J30.2 SEASONAL ALLERGIES: ICD-10-CM

## 2025-01-14 PROCEDURE — 95117 IMMUNOTHERAPY INJECTIONS: CPT | Performed by: ALLERGY & IMMUNOLOGY

## 2025-01-16 ENCOUNTER — APPOINTMENT (OUTPATIENT)
Dept: PRIMARY CARE | Facility: CLINIC | Age: 55
End: 2025-01-16
Payer: COMMERCIAL

## 2025-01-16 VITALS
TEMPERATURE: 97.7 F | OXYGEN SATURATION: 97 % | HEART RATE: 96 BPM | BODY MASS INDEX: 31.73 KG/M2 | WEIGHT: 214.2 LBS | HEIGHT: 69 IN | SYSTOLIC BLOOD PRESSURE: 126 MMHG | DIASTOLIC BLOOD PRESSURE: 78 MMHG

## 2025-01-16 DIAGNOSIS — K21.9 GASTROESOPHAGEAL REFLUX DISEASE WITHOUT ESOPHAGITIS: ICD-10-CM

## 2025-01-16 DIAGNOSIS — M1A.9XX0 CHRONIC GOUT WITHOUT TOPHUS, UNSPECIFIED CAUSE, UNSPECIFIED SITE: ICD-10-CM

## 2025-01-16 DIAGNOSIS — R06.02 SOB (SHORTNESS OF BREATH): ICD-10-CM

## 2025-01-16 DIAGNOSIS — G47.33 OBSTRUCTIVE SLEEP APNEA: ICD-10-CM

## 2025-01-16 DIAGNOSIS — E78.2 HYPERLIPEMIA, MIXED: ICD-10-CM

## 2025-01-16 DIAGNOSIS — G43.009 MIGRAINE WITHOUT AURA AND WITHOUT STATUS MIGRAINOSUS, NOT INTRACTABLE: ICD-10-CM

## 2025-01-16 DIAGNOSIS — Q23.81 BICUSPID AORTIC VALVE: ICD-10-CM

## 2025-01-16 DIAGNOSIS — N52.9 ERECTILE DYSFUNCTION, UNSPECIFIED ERECTILE DYSFUNCTION TYPE: ICD-10-CM

## 2025-01-16 DIAGNOSIS — K50.919 CROHN'S DISEASE WITH COMPLICATION, UNSPECIFIED GASTROINTESTINAL TRACT LOCATION: ICD-10-CM

## 2025-01-16 DIAGNOSIS — N18.31 STAGE 3A CHRONIC KIDNEY DISEASE (MULTI): ICD-10-CM

## 2025-01-16 DIAGNOSIS — R97.20 INCREASED PROSTATE SPECIFIC ANTIGEN (PSA) VELOCITY: ICD-10-CM

## 2025-01-16 DIAGNOSIS — I10 PRIMARY HYPERTENSION: Primary | ICD-10-CM

## 2025-01-16 DIAGNOSIS — I71.21 ANEURYSM OF ASCENDING AORTA WITHOUT RUPTURE: ICD-10-CM

## 2025-01-16 PROBLEM — K50.10 CROHN'S DISEASE OF LARGE INTESTINE WITHOUT COMPLICATION (MULTI): Status: ACTIVE | Noted: 2024-08-12

## 2025-01-16 PROCEDURE — 3074F SYST BP LT 130 MM HG: CPT | Performed by: FAMILY MEDICINE

## 2025-01-16 PROCEDURE — 99214 OFFICE O/P EST MOD 30 MIN: CPT | Performed by: FAMILY MEDICINE

## 2025-01-16 PROCEDURE — 3078F DIAST BP <80 MM HG: CPT | Performed by: FAMILY MEDICINE

## 2025-01-16 PROCEDURE — 3008F BODY MASS INDEX DOCD: CPT | Performed by: FAMILY MEDICINE

## 2025-01-16 NOTE — PROGRESS NOTES
Subjective   Patient ID: Mau Cisse is a 55 y.o. male who presents for Follow-up.    HPI     Pt declines Covid shot.     Pt states the SOB is still present but much improved.     Pt states he is still fasting and taking a supplement to help with inflammation and states he is losing weight. He has lost 8 pounds since last visit.     Pt states the tumor in his left ear is back and has to have another surgery. He would need a revision left tympanomastoidectomy scheduled for 4/23/2025 with Dr. Bautista (otolaryngology) at [facility].    Labs & PSA: 01/07/2025  Colon: 08/07/2024      Patient has hypertension.  He does not monitor BP at home.   Denies CP, SOB, dizziness, and LE edema.   Patient is compliant with his antihypertensive therapy and denies any noted side effects.     He has hyperlipidemia.  Patient has been compliant with his statin therapy and denies any noted side effects.  Pt does try to reduce the amount of cholesterol and fatty foods he consumes.     Pt has GERD.  GERD is stable with the current dose of omeprazole.   He denies any breakthrough reflux symptoms.     Patient has allergic rhinitis.  His allergies are well controlled with a combination of Zyrtec and fluticasone nasal spray.     Pt has gout.  He takes Colchicine prn.   He denies any flare-ups since his last visit here.     He has a history of osteoarthritis of both hips.  Has undergone bilat hip replacements.  Hips joints are feeling good.     Pt has Crohn's disease.  He sees GI (Dr. Richard) on a regular basis.   He is no longer on Cimzia injections and will be changing medications.  He currently denies any abdominal pain, nausea, vomiting, diarrhea, melena, or hematochezia.     Patient has migraine headaches.   He takes Maxalt as needed for his migraines.  Patient has been unable to take the generic version of Maxalt (rizatriptan) as it gives him dizziness, upset stomach, nausea, and lethargy.   He tolerates the  "brand-name medication without noted side effects.        Review of Systems  Constitutional: Patient denies any fever, chills, loss of appetite, or unexplained weight loss.  Cardiovascular: Patient denies any chest pain, shortness of breath with exertion, tachycardia, palpitations, orthopnea, or paroxysmal nocturnal dyspnea.  Respiratory: Patient denies any cough, shortness of breath, or wheezing.  Gastrointestinal: Patient denies any nausea, vomiting, diarrhea, constipation, melena, hematochezia, or reflux symptoms  Skin: Denies any rashes or skin lesions  Neurology: Patient denies any new motor or sensory losses. Denies any numbness, tingling, weakness, and incoordination of the extremities. Patient also denies any tremor, seizures, or gait instability.  Endocrinology: Denies any polyuria, polydipsia, polyphagia, or heat/cold intolerance.      Objective   /78   Pulse 96   Temp 36.5 °C (97.7 °F) (Temporal)   Ht 1.753 m (5' 9\")   Wt 97.2 kg (214 lb 3.2 oz)   SpO2 97%   BMI 31.63 kg/m²     Physical Exam  General Appearance: Alert and cooperative, in no acute distress, well-developed/well-nourished, obese male.  Neck: Supple and without adenopathy or rigidity. There is no JVD at 90° and no carotid bruits are noted. There is no thyromegaly, thyroid tenderness, or palpable thyroid nodules.    Heart: Regular rate and rhythm with grade 1/6 murmur at the RSB and no noted ectopy.     Lungs: Clear to auscultation bilaterally with good air exchange.  Skin: Good turgor, moist, warm and without rashes or lesions.  Neurological exam: Alert and oriented ×3, no tremor, normal gait.  Extremities: No clubbing, cyanosis, or edema      Assessment/Plan       HTN:   Stable on in office readings.  We will continue with current medications.     Mixed hyperlipidemia:   Stable based on recent labs.  Risk factor reduction for CAD was discussed at length.  He has tolerated the statin therapy without side effects.  3/28/2024: Stress " test normal.  Will continue atorvastatin at current dose.  Lab Results   Component Value Date    CHOL 141 01/07/2025    CHOL 154 07/09/2024    CHOL 132 10/13/2023     Lab Results   Component Value Date    HDL 37.4 01/07/2025    HDL 30.5 07/09/2024    HDL 30.3 10/13/2023     Lab Results   Component Value Date    LDLCALC 84 01/07/2025    LDLCALC 88 07/09/2024    LDLCALC 79 10/13/2023     Lab Results   Component Value Date    TRIG 96 01/07/2025    TRIG 180 (H) 07/09/2024    TRIG 113 10/13/2023      GERD: Stable based on symptoms.  He will continue the current dosing of omeprazole and appropriate dietary changes.     Migraine headaches: Stable on the current treatment plan.  Patient will continue to use the Maxalt as needed. The brand-name Maxalt has continued to work well.  HE CAN NOT TOLERATE THE GENERIC FORM AS HE EXPERIENCES SIDE EFFECTS OF DIZZINESS, UPSET STOMACH, NAUSEA, AND LETHARGY.      Crohn's Disease:   Stable based on symptoms.  We will continue the current medication and continue to follow with his GI specialist.     Gout:  Stable based on symptoms as has not had a recent flare.  We will continue to monitor.  We discussed goal of getting his uric acid level below 6.  At that point we can consider stopping the Colchicine.   His most recent uric acid level was 4.5.     Stage 3a CKD with hx of right nephrectomy 2005) for renal CA:   Stable based on labs.  He was encouraged to avoid NSAIDs.   He is currently stable and will continue follow-up with nephrology as directed.    Obstructive sleep apnea:  He was started on CPAP but could not tolerate the pressure at 16 cm H2O.   Was changed to Auto pap and referred to sleep medicine.  He is to continue nightly use of the CPAP.     SOB with exertion:  Symptoms have persisted but are significantly improved.  Will continue to follow with pulmonology and cardiology at this point.     Aneurysm of the ascending aorta / Bicuspid aortic valve:   Will continue to follow with  cardiology.  Will need surgical repair at some point in the future.     Erectile dysfunction:  Stable based on symptoms.  Continue the sildenafil as needed.    Increased PSA velocity:  His PSA has increased from 0.9 to 2.9 in one year.   Will recheck his PSA level in six months.  Advised he avoid spicy foods, sex, and activities where he would be sitting and bouncing like bike riding and horseback riding one week before completing the test as these three things can artificially increase the PSA.  Lab Results   Component Value Date    PSASCREEN 2.90 01/07/2025    PSASCREEN 0.90 10/13/2023    PSASCREEN 0.70 04/18/2022             Follow up in 3 months.      Scribe Attestation  By signing my name below, I, Alhaji Martinez   attest that this documentation has been prepared under the direction and in the presence of Leo Flores DO.

## 2025-01-16 NOTE — PATIENT INSTRUCTIONS
We will recheck the PSA and testosterone in 6 months as we discussed.    Follow up in 3 months.    It was a pleasure to see you today. Thank you for choosing us for your health care needs.    If you have lab or other testing completed and have not been informed of results within one week, please call the office for your results.    If you haven't done so, consider signing up for [email or web address], the [facility] personal health record. Ask the staff how you can get started.

## 2025-01-20 ENCOUNTER — TELEPHONE (OUTPATIENT)
Dept: PULMONOLOGY | Facility: CLINIC | Age: 55
End: 2025-01-20
Payer: COMMERCIAL

## 2025-01-24 ENCOUNTER — CLINICAL SUPPORT (OUTPATIENT)
Dept: AUDIOLOGY | Facility: CLINIC | Age: 55
End: 2025-01-24
Payer: COMMERCIAL

## 2025-01-24 DIAGNOSIS — H90.6 MIXED HEARING LOSS, BILATERAL: Primary | ICD-10-CM

## 2025-01-24 PROCEDURE — 92557 COMPREHENSIVE HEARING TEST: CPT

## 2025-01-24 PROCEDURE — 92567 TYMPANOMETRY: CPT

## 2025-01-24 ASSESSMENT — PAIN SCALES - GENERAL: PAINLEVEL_OUTOF10: 0 - NO PAIN

## 2025-01-24 ASSESSMENT — PAIN - FUNCTIONAL ASSESSMENT: PAIN_FUNCTIONAL_ASSESSMENT: 0-10

## 2025-01-24 NOTE — PROGRESS NOTES
AUDIOLOGIC EVALUATION  Name: Jamil Cisse  YOB: 1970  MRN: [id number]  Age: 55 y.o.    Date of Evaluation:  1/24/2025    History:  Jamil Cisse, 55 y.o., was seen today for a hearing evaluation on order from Dr. Bautista.  Recall, He is status post left tympanoplasty,mastoidectomy, ossiculoplasty (TORP) by Dr Jamil Brizuela. The surgery was in June 2024. The patient reported improved hearing in the left ear following this surgery. He had a left ear infection accompanied by left otalgia, pressure, aural fullness and pulsatile tinnitus recently. He denied any recent dizziness.     Previous audiologic evaluation on 12/20/2023 revealed a moderate conductive hearing loss rising to normal at 1000 Hz sloping back to a moderate mixed hearing loss in the right ear. In the left ear, thresholds are consistent with a moderate sloping to profound mixed hearing loss. Word recognition abilities were measured to be excellent in the right ear and good in the left ear. Tympanograms were type B (flat) in the right ear and type A (normal) in the left ear.     Evaluation:    Otoscopy  Mild debris present in both ears    Tympanometry  Right ear: Type B, large ear canal volume and no measurable compliance. This is consistent with a tympanic membrane perforation.  Left ear: Type B, normal ear canal volume and no measurable compliance. This is consistent with middle ear effusion/debris blocking the canal.    Acoustic Reflexes  Right ear: Did not test due to abnormal tympanogram  Left ear: Did not test due to abnormal tympanogram    Distortion Product Otoacoustic Emissions (DPOAEs)  Right ear: Did not test due to abnormal tympanogram  Left ear: Did not test due to abnormal tympanogram    Audiometric Evaluation  Right ear: mild hearing loss rising to within normal limits from 500 - 1000 Hz sloping to a moderate mixed hearing loss. Word recognition ability estimated to be excellent(100%) at 75 dB HL based on an NU-6 recorded  ordered by difficulty 10-word list.  Left ear: moderate sloping to profound mixed hearing loss. Word recognition ability estimated to be excellent(100%) at 90 dB HL based on an NU-6 recorded ordered by difficulty 10-word list.    When compared to previous test results of 12/20/2023, thresholds are stable with the exception of decreased hearing sensitivity at 6000 - 8000 Hz in the left ear only. Word recognition ability in the left ear is improved.    The test results were discussed with the patient.     Impressions  Today's evaluation revealed a mild rising to normal at 500 - 1000 Hz sloping to a moderate mixed hearing loss in the right ear and a moderate sloping to profound mixed hearing loss in the left ear. Word recognition abilities were measured to be excellent bilaterally. Tympanograms were type B (flat) bilaterally.    Recommendations  - Continue medical follow-up with established providers   - Continue medical follow-up with Dr. Bautista  - Re-test hearing in conjunction with otologic management    Time: 0084-0381    GEORGETTE Mercer, CCC-A  Licensed Audiologist

## 2025-01-30 ENCOUNTER — PATIENT MESSAGE (OUTPATIENT)
Dept: PRIMARY CARE | Facility: CLINIC | Age: 55
End: 2025-01-30
Payer: COMMERCIAL

## 2025-01-30 DIAGNOSIS — R97.20 INCREASED PROSTATE SPECIFIC ANTIGEN (PSA) VELOCITY: Primary | ICD-10-CM

## 2025-02-13 ENCOUNTER — APPOINTMENT (OUTPATIENT)
Dept: SURGERY | Facility: CLINIC | Age: 55
End: 2025-02-13
Payer: COMMERCIAL

## 2025-02-13 ENCOUNTER — HOSPITAL ENCOUNTER (OUTPATIENT)
Facility: HOSPITAL | Age: 55
Setting detail: OUTPATIENT SURGERY
End: 2025-02-13
Attending: SURGERY | Admitting: SURGERY
Payer: COMMERCIAL

## 2025-02-13 VITALS — BODY MASS INDEX: 31.7 KG/M2 | HEIGHT: 69 IN | WEIGHT: 214 LBS

## 2025-02-13 DIAGNOSIS — K64.4 SKIN TAG OF PERIANAL REGION: Primary | ICD-10-CM

## 2025-02-13 PROCEDURE — 3008F BODY MASS INDEX DOCD: CPT | Performed by: SURGERY

## 2025-02-13 PROCEDURE — 99202 OFFICE O/P NEW SF 15 MIN: CPT | Performed by: SURGERY

## 2025-02-13 NOTE — PROGRESS NOTES
"Subjective   Patient ID: Jamil Cisse \"Mau\" is a 55 y.o. male who presents for New Patient Visit (Kin tag by rectum).  HPI  55-year-old male patient history of Crohn's disease that is in remission. I saw him in 7/2020 for the skin tag but he wanted to hold off on surgery due to his work schedule. Per him, the skin tag is preventing him from cleaning himself well after bowel movement. \"It gets in the way and stop me from cleaning myself\". Denies pain      Objective   Physical Exam  Gen: no acute distress  CV: RRR  Pulm: CTAB  Musculosketal: moves all 4 extremities, strength and sensation intact   Perianal: 1-2 cm skin tag to anterior midline   Assessment/Plan   He will undergo excision of the skin tag.  I explained to him the procedure, the risks and complications which include but not limited to bleeding, infection and wound dehiscence.  All questions answered and willing to proceed         Betito Loco MD 02/13/25 4:07 PM   "

## 2025-02-18 ENCOUNTER — APPOINTMENT (OUTPATIENT)
Dept: ALLERGY | Facility: CLINIC | Age: 55
End: 2025-02-18
Payer: COMMERCIAL

## 2025-02-18 DIAGNOSIS — J30.2 SEASONAL ALLERGIES: ICD-10-CM

## 2025-02-18 PROCEDURE — 95117 IMMUNOTHERAPY INJECTIONS: CPT | Performed by: ALLERGY & IMMUNOLOGY

## 2025-03-03 DIAGNOSIS — I10 HTN (HYPERTENSION), BENIGN: ICD-10-CM

## 2025-03-03 RX ORDER — ASPIRIN 81 MG/1
81 TABLET ORAL DAILY
Qty: 90 TABLET | Refills: 0 | Status: SHIPPED | OUTPATIENT
Start: 2025-03-03

## 2025-03-03 RX ORDER — TELMISARTAN AND HYDROCHLORTHIAZIDE 80; 25 MG/1; MG/1
1 TABLET ORAL DAILY
Qty: 90 TABLET | Refills: 0 | Status: SHIPPED | OUTPATIENT
Start: 2025-03-03

## 2025-03-17 DIAGNOSIS — K21.9 GASTROESOPHAGEAL REFLUX DISEASE WITHOUT ESOPHAGITIS: ICD-10-CM

## 2025-03-17 DIAGNOSIS — J30.9 ALLERGIC RHINITIS, UNSPECIFIED SEASONALITY, UNSPECIFIED TRIGGER: ICD-10-CM

## 2025-03-17 RX ORDER — FLUTICASONE PROPIONATE 50 MCG
2 SPRAY, SUSPENSION (ML) NASAL DAILY
Qty: 48 G | Refills: 0 | Status: SHIPPED | OUTPATIENT
Start: 2025-03-17

## 2025-03-17 RX ORDER — OMEPRAZOLE 40 MG/1
40 CAPSULE, DELAYED RELEASE ORAL DAILY
Qty: 90 CAPSULE | Refills: 0 | Status: SHIPPED | OUTPATIENT
Start: 2025-03-17

## 2025-03-18 ENCOUNTER — CLINICAL SUPPORT (OUTPATIENT)
Dept: ALLERGY | Facility: CLINIC | Age: 55
End: 2025-03-18
Payer: COMMERCIAL

## 2025-03-18 ENCOUNTER — APPOINTMENT (OUTPATIENT)
Dept: NEPHROLOGY | Facility: CLINIC | Age: 55
End: 2025-03-18
Payer: COMMERCIAL

## 2025-03-18 DIAGNOSIS — J30.2 SEASONAL ALLERGIES: ICD-10-CM

## 2025-03-18 PROCEDURE — 95117 IMMUNOTHERAPY INJECTIONS: CPT | Performed by: ALLERGY & IMMUNOLOGY

## 2025-03-24 DIAGNOSIS — E78.2 HYPERLIPEMIA, MIXED: ICD-10-CM

## 2025-03-24 RX ORDER — ATORVASTATIN CALCIUM 20 MG/1
20 TABLET, FILM COATED ORAL DAILY
Qty: 90 TABLET | Refills: 0 | Status: SHIPPED | OUTPATIENT
Start: 2025-03-24

## 2025-03-30 DIAGNOSIS — J30.1 SEASONAL ALLERGIC RHINITIS DUE TO POLLEN: ICD-10-CM

## 2025-03-30 RX ORDER — MONTELUKAST SODIUM 10 MG/1
10 TABLET ORAL NIGHTLY
Qty: 90 TABLET | Refills: 1 | Status: SHIPPED | OUTPATIENT
Start: 2025-03-30

## 2025-04-01 ENCOUNTER — APPOINTMENT (OUTPATIENT)
Dept: NEPHROLOGY | Facility: CLINIC | Age: 55
End: 2025-04-01
Payer: COMMERCIAL

## 2025-04-06 NOTE — PROGRESS NOTES
"History of present illness:  Mau Cisse is a 55 y.o. male presenting today for follow up.    RECALL 1/3/2025  Mau Cisse is a 54 y.o. male presenting today for follow up. Patient continues to have intermittent drainage from his left ear. He has tried several courses of antibiotics, systemic and topical. It appears that those do help temporarily, but his symptoms recur after a period of time. He has tried even amoxicillin, which he thinks has helped the most. Hearing remains good.     RECALL 10/7/2024  Jamil Cisse 54 y.o. male  presenting today for follow-up. He is status post left tympanoplasty,mastoidectomy, ossiculoplasty (TORP) by Dr Jamil Brizuela. The surgery was in June 2024. The patient reports a recurrence of pain and drainage approximately two days ago. There is significant subjective improvement in hearing.       Allergies   Allergen Reactions    Adhesive Tape-Silicones Rash and Other     skin pulled off    Codeine Other     \"Skin got so hot\"    Sulfa (Sulfonamide Antibiotics) Other    Adhesive Other and Unknown     Pulls skin off    Amlodipine Besylate Rash     Flush    Lisinopril Other     ineffective    Sumatriptan Other     ineffective    Valsartan Other     ineffective     Current list of medications:   Current Outpatient Medications   Medication Sig Dispense Refill    aspirin 81 mg EC tablet Take 1 tablet (81 mg) by mouth once daily. 90 tablet 0    aspirin-acetaminophen-caffeine (Excedrin Extra Strength) 250-250-65 mg tablet Take 1 tablet by mouth every 6 hours if needed.      atorvastatin (Lipitor) 20 mg tablet Take 1 tablet (20 mg) by mouth once daily. 90 tablet 0    certolizumab pegol (Cimzia Powder for Reconst) injection Inject 1 mL (200 mg) under the skin every 14 (fourteen) days.      cetirizine (ZyrTEC) 10 mg tablet Take by mouth once daily.      ciprofloxacin-dexamethasone (CiproDEX) otic suspension Instill 4 drops to affected ear twice daily 7.5 mL 0    ciprofloxacin-dexamethasone " (Ciprodex) otic suspension Administer 4 drops into the left ear 2 times a day. 37.5 mL 0    clindamycin (Cleocin) 300 mg capsule TAKE 2 CAPSULES BY MOUTH ONE HOUR BEFORE APPOINTMENT      colchicine 0.6 mg tablet Take 1 tablet (0.6 mg) by mouth once daily. 90 tablet 0    febuxostat (Uloric) 40 mg tablet Take 1 tablet (40 mg) by mouth once daily. 90 tablet 0    fluticasone (Flonase) 50 mcg/actuation nasal spray Administer 2 sprays into each nostril once daily. 48 g 0    Maxalt-MLT 10 mg disintegrating tablet Take 1 tablet (10 mg) by mouth 1 time if needed for migraine. May repeat in 2 hours if unresolved. Do not exceed 30 mg in 24 hours. 30 tablet 0    montelukast (Singulair) 10 mg tablet Take 1 tablet (10 mg) by mouth once daily at bedtime. 90 tablet 1    omeprazole (PriLOSEC) 40 mg DR capsule Take 1 capsule (40 mg) by mouth once daily. 90 capsule 0    sildenafil (Viagra) 100 mg tablet Take 1 tablet (100 mg) by mouth once daily as needed for erectile dysfunction. 30 tablet 0    telmisartan-hydrochlorothiazid (MIcarDIS HCT) 80-25 mg tablet Take 1 tablet by mouth once daily. 90 tablet 0    Xiidra 5 % dropperette INSTILL 1 DROP IN BOTH EYES TWICE A DAY       No current facility-administered medications for this visit.     Problem list:  Patient Active Problem List   Diagnosis    Allergic rhinitis    Cervical radiculitis    Crohn's disease (Multi)    Erythrocytosis    GERD (gastroesophageal reflux disease)    Gout    Hip pain    History of total hip replacement    HTN (hypertension)    Hyperlipemia, mixed    Migraine headache    Nontraumatic incomplete tear of right rotator cuff    Osteoarthritis of hip    Osteoarthritis of spine with radiculopathy, cervical region    Rotator cuff tear    Skin tag of perianal region    Stage 3a chronic kidney disease (Multi)    Personal history of renal cancer    WILFRID (obstructive sleep apnea)    Elevated LFTs    Excessive daytime sleepiness    Mixed hearing loss, bilateral    Shoulder  pain    Snoring    Angioedema    Contusion of hip    Displacement of lumbar intervertebral disc without myelopathy    ED (erectile dysfunction)    Lumbago    Maxillary sinusitis    Reflux esophagitis    Ulcerative colitis    Degenerative arthritis of hip    Primary osteoarthritis of right hip    Class 1 obesity with serious comorbidity and body mass index (BMI) of 34.0 to 34.9 in adult    Tympanic membrane perforation    Nontraumatic rupture of muscle or tendon of rotator cuff of right shoulder    Hypercalcemia    History of encephalitis    Heart murmur    Gouty arthritis of toe    Elevation of levels of liver transaminase levels    Disorder of anus    Allergic conjunctivitis    Fatty liver    Cholesteatoma of middle ear and mastoid    Bicuspid aortic valve    Left ventricular hypertrophy    Seasonal allergies    Dilatation of thoracic aorta    Chronic kidney disease    Encounter to discuss test results    Encounter for pre-operative cardiovascular clearance    Cholesteatoma of left mastoid    Aneurysm of ascending aorta without rupture    Nonrheumatic aortic valve insufficiency    Lymphadenopathy    ILD (interstitial lung disease) (Multi)    Mediastinal lymphadenopathy    Abnormal CT of the chest    PONV (postoperative nausea and vomiting)    Chronic otitis media    Crohn's disease of large intestine without complication (Multi)     Physical Examination:  CONSTITUTIONAL:  No acute distress  VOICE:  No hoarseness or other abnormality  RESPIRATION:  Breathing comfortably, no stridor  CV:  No clubbing/cyanosis/edema in hands  EYES:  EOM intact, sclera clear  NEURO:  Alert and oriented times 3, Cranial nerves II-XII grossly intact and symmetric bilaterally  HEAD AND FACE:  Symmetric facial features, no masses or lesions  RIGHT EAR:  Normal external ear and post auricular area, no visible lesions, external auditory canal patent, tympanic membrane intact, no retraction, no signs of mass, effusion, or infection within the  middle ear, small perforation posteriorly, no cholesteatoma, no infection, there is some evidence of myringosclerosis  LEFT EAR: Normal external ear and post auricular area, no visible lesions, external auditory canal patent, tympanic membrane intact, no retraction, no signs of mass, effusion, or infection within the middle ear, significant casting caused by trapped and dried powder, this was removed using the otomicroscope. The TM has central perforation raising a concern for secondary cholesteatoma  NOSE:  Anterior rhinoscopy clear, no significant external deformity.  ORAL CAVITY/OROPHARYNX/LIPS:  normal lining, mobile tongue.  PHARYNGEAL WALLS: Moist mucosal lining, good palatal elevation  NECK/LYMPH:  No LAD, no thyroid masses, trachea midline  SKIN:  Neck and facial skin is without scar or injury  PSYCH:  Alert and oriented with appropriate mood and affect    Impression:  Bilateral chronic otitis media   Left chronic myringitis  History of left cholesteatoma   Status post tympanoplasty, mastoidectomy and ossiculoplasty  Bilateral eustachian tube dysfunction   Recurrent left middle ear cholesteatoma   Left conductive hearing loss     Recommendation:  He will be undergoing a left tympanomastoidectomy and his surgery is scheduled for April 23 rd.  All questions were answered to his satisfaction.     I discussed with the patient the complexity of my medical decision making including the treatment and testing rational, indications of their elective procedure and possible adverse effects and/or complications. Based on the provided documentation and my professional assessment of this patient's chronic condition with acute exacerbation, the complexity of evaluation and treatment is low.    This note was created using speech recognition transcription software. Despite proofreading, several typographical errors might be present that might affect the meaning of the content. Please call with any questions.      By signing  my name below, I, Alhaji Weir attest that this documentation has been prepared under the direction and in the presence of Candis Bautista MD

## 2025-04-07 ENCOUNTER — APPOINTMENT (OUTPATIENT)
Facility: CLINIC | Age: 55
End: 2025-04-07
Payer: COMMERCIAL

## 2025-04-07 VITALS — HEIGHT: 69 IN | BODY MASS INDEX: 32.58 KG/M2 | WEIGHT: 220 LBS

## 2025-04-07 DIAGNOSIS — M1A.9XX0 CHRONIC GOUT WITHOUT TOPHUS, UNSPECIFIED CAUSE, UNSPECIFIED SITE: ICD-10-CM

## 2025-04-07 DIAGNOSIS — H66.005 RECURRENT ACUTE SUPPURATIVE OTITIS MEDIA WITHOUT SPONTANEOUS RUPTURE OF LEFT TYMPANIC MEMBRANE: ICD-10-CM

## 2025-04-07 DIAGNOSIS — Z01.818 PREOPERATIVE TESTING: ICD-10-CM

## 2025-04-07 PROCEDURE — 3008F BODY MASS INDEX DOCD: CPT | Performed by: OTOLARYNGOLOGY

## 2025-04-07 PROCEDURE — 1036F TOBACCO NON-USER: CPT | Performed by: OTOLARYNGOLOGY

## 2025-04-07 PROCEDURE — 99213 OFFICE O/P EST LOW 20 MIN: CPT | Performed by: OTOLARYNGOLOGY

## 2025-04-07 RX ORDER — FEBUXOSTAT 40 MG/1
40 TABLET, FILM COATED ORAL DAILY
Qty: 90 TABLET | Refills: 0 | Status: SHIPPED | OUTPATIENT
Start: 2025-04-07

## 2025-04-08 LAB
ANION GAP SERPL CALCULATED.4IONS-SCNC: 15 MMOL/L (CALC) (ref 7–17)
BUN SERPL-MCNC: 18 MG/DL (ref 7–25)
BUN/CREAT SERPL: 13 (CALC) (ref 6–22)
CALCIUM SERPL-MCNC: 10.2 MG/DL (ref 8.6–10.3)
CHLORIDE SERPL-SCNC: 100 MMOL/L (ref 98–110)
CO2 SERPL-SCNC: 25 MMOL/L (ref 20–32)
CREAT SERPL-MCNC: 1.41 MG/DL (ref 0.7–1.3)
EGFRCR SERPLBLD CKD-EPI 2021: 59 ML/MIN/1.73M2
ERYTHROCYTE [DISTWIDTH] IN BLOOD BY AUTOMATED COUNT: 17.5 % (ref 11–15)
GLUCOSE SERPL-MCNC: 77 MG/DL (ref 65–99)
HCT VFR BLD AUTO: 46.9 % (ref 38.5–50)
HGB BLD-MCNC: 14.5 G/DL (ref 13.2–17.1)
MCH RBC QN AUTO: 23.3 PG (ref 27–33)
MCHC RBC AUTO-ENTMCNC: 30.9 G/DL (ref 32–36)
MCV RBC AUTO: 75.4 FL (ref 80–100)
PLATELET # BLD AUTO: 289 THOUSAND/UL (ref 140–400)
PMV BLD REES-ECKER: 11.5 FL (ref 7.5–12.5)
POTASSIUM SERPL-SCNC: 4.1 MMOL/L (ref 3.5–5.3)
RBC # BLD AUTO: 6.22 MILLION/UL (ref 4.2–5.8)
SODIUM SERPL-SCNC: 140 MMOL/L (ref 135–146)
WBC # BLD AUTO: 9.6 THOUSAND/UL (ref 3.8–10.8)

## 2025-04-11 ENCOUNTER — OFFICE VISIT (OUTPATIENT)
Dept: URGENT CARE | Age: 55
End: 2025-04-11
Payer: COMMERCIAL

## 2025-04-11 VITALS
WEIGHT: 220 LBS | BODY MASS INDEX: 32.58 KG/M2 | DIASTOLIC BLOOD PRESSURE: 89 MMHG | HEIGHT: 69 IN | HEART RATE: 76 BPM | OXYGEN SATURATION: 98 % | TEMPERATURE: 98.1 F | RESPIRATION RATE: 18 BRPM | SYSTOLIC BLOOD PRESSURE: 152 MMHG

## 2025-04-11 DIAGNOSIS — J01.10 ACUTE NON-RECURRENT FRONTAL SINUSITIS: Primary | ICD-10-CM

## 2025-04-11 RX ORDER — AMOXICILLIN AND CLAVULANATE POTASSIUM 875; 125 MG/1; MG/1
1 TABLET, FILM COATED ORAL 2 TIMES DAILY
Qty: 20 TABLET | Refills: 0 | Status: SHIPPED | OUTPATIENT
Start: 2025-04-11 | End: 2025-04-21

## 2025-04-11 ASSESSMENT — ENCOUNTER SYMPTOMS
EYES NEGATIVE: 1
PALPITATIONS: 0
SINUS PRESSURE: 1
NEUROLOGICAL NEGATIVE: 1
HEMATOLOGIC/LYMPHATIC NEGATIVE: 1
MUSCULOSKELETAL NEGATIVE: 1
CONSTITUTIONAL NEGATIVE: 1
PSYCHIATRIC NEGATIVE: 1
ALLERGIC/IMMUNOLOGIC NEGATIVE: 1
RESPIRATORY NEGATIVE: 1
ENDOCRINE NEGATIVE: 1
GASTROINTESTINAL NEGATIVE: 1
SINUS PAIN: 1

## 2025-04-11 NOTE — PROGRESS NOTES
"Subjective   Patient ID: Jamil Cisse \"Mau\" is a 55 y.o. male. They present today with a chief complaint of Illness (X 3-5 days complains of cough, runny nose, nasal congestion, sinus pressure, headache, post nasal drip, Left ear pressure, yellowish brown mucus. Has tried Elisa Baylis and Mucinex with no relief.).    History of Present Illness  HPI    Pt presents to urgent care with c/o  sinus pain/pressure, headache, postnasal drainage, ear pressure, yellow/green mucus for the past 5 days.  Reports he has been taking over-the-counter medications with minimal relief  .  Pt denies CP, SOB, palpitations, fevers, abd pain, n/v/d, sick contacts, recent travel.       Past Medical History  Allergies as of 04/11/2025 - Reviewed 04/11/2025   Allergen Reaction Noted    Adhesive tape-silicones Rash and Other 04/27/2023    Codeine Other 04/27/2023    Sulfa (sulfonamide antibiotics) Other 04/27/2023    Adhesive Other and Unknown 10/10/2012    Amlodipine besylate Rash 03/13/2017    Lisinopril Other 03/14/2012    Sumatriptan Other 03/14/2012    Valsartan Other 03/14/2012       (Not in a hospital admission)       Past Medical History:   Diagnosis Date    Abnormal ECG 09/16/2022    Normal sinus rhythm Nonspecific T wave abnormality    Arthritis     Bicuspid aortic valve     Cholesteatoma of middle ear and mastoid     CKD (chronic kidney disease)     stage 3, seen by Dr. Kemp (Nephology)    Dilated aortic root (CMS-HCC)     Minimally dilated aortic root 39 mm.    Diverticulosis     GERD (gastroesophageal reflux disease)     Gout     Heart murmur     Echo scheduled for 02/29/24    History of PFTs 06/16/2023    Dyspnea; Mild restriction on PFTs    HL (hearing loss)     Hyperlipidemia     Hypertension     Lung nodules     Nonspecific 1.9 cm right upper lobe part solid nodule with mediastinal lymphadenopathy    Mild aortic valve regurgitation     Obesity     Personal history of infections of the central nervous system 02/2018    " History of Herpes Encephalitis    PONV (postoperative nausea and vomiting)     Reactive airway disease ([facility]-HCC)     Renal cancer (Multi) 2003    s/p right radical nephrectomy in 2003.    Renal mass, left     Inadequately characterized 2.6 cm left renal mass. Consider further evaluation with renal US    Shortness of breath     seen by LJ Baptiste (pulmonology)    Sinusitis     Sleep apnea     Uses CPAP    Thoracic ascending aortic aneurysm 02/21/2024    4.3cm per CT scan on 02/21/24    Ulcerative colitis     Treated with Cimzia    Vision loss     Wears contacts       Past Surgical History:   Procedure Laterality Date    COLONOSCOPY      CT CHEST WO IV CONTRAST  02/20/2024    Nonspecific 1.9 cm right upper lobe part solid nodule with mediastinal lymphadenopathy.Ascending thoracic aortic aneurysm, 4.3 cm.    HERNIA REPAIR      NEPHRECTOMY Right 2005    Right total nephrectomy    OTHER SURGICAL HISTORY      Left Shoulder surgery    OTHER SURGICAL HISTORY  07/09/2020    Ear surgery x3    OTHER SURGICAL HISTORY  07/09/2020    Finger surgical procedure    OTHER SURGICAL HISTORY  07/09/2020    Throat surgery    ROTATOR CUFF REPAIR Right 09/23/2022    Right rotator cuff repair with subacromial decompression    TOTAL HIP ARTHROPLASTY Bilateral 05/2017    Bilateral Hip replacement    US CAROTID DOPPLER LEFT  2018    NO HEMODYNAMICALLY SIGNIFICANT VASCULAR STENOSIS IN THE CAROTID AND VERTEBRAL ARTERIES IN THE NECK    VASECTOMY          reports that he has quit smoking. His smoking use included cigarettes. He has a 6 pack-year smoking history. He has never been exposed to tobacco smoke. He has never used smokeless tobacco. He reports current alcohol use of about 4.0 standard drinks of alcohol per week. He reports that he does not use drugs.    Review of Systems  Review of Systems   Constitutional: Negative.    HENT:  Positive for congestion, ear pain, postnasal drip, sinus pressure and sinus pain.    Eyes: Negative.  "   Respiratory: Negative.     Cardiovascular:  Negative for chest pain and palpitations.   Gastrointestinal: Negative.    Endocrine: Negative.    Genitourinary: Negative.    Musculoskeletal: Negative.    Skin: Negative.    Allergic/Immunologic: Negative.    Neurological: Negative.    Hematological: Negative.    Psychiatric/Behavioral: Negative.     All other systems reviewed and are negative.                                 Objective    Vitals:    04/11/25 1523   BP: 152/89   BP Location: Right arm   Patient Position: Sitting   BP Cuff Size: Large adult   Pulse: 76   Resp: 18   Temp: 36.7 °C (98.1 °F)   TempSrc: Temporal   SpO2: 98%   Weight: 99.8 kg (220 lb)   Height: 1.753 m (5' 9\")     No LMP for male patient.    Physical Exam  Vitals and nursing note reviewed.   Constitutional:       General: He is not in acute distress.     Appearance: Normal appearance. He is not ill-appearing or toxic-appearing.   HENT:      Head: Atraumatic.      Right Ear: Tympanic membrane, ear canal and external ear normal.      Left Ear: Tympanic membrane, ear canal and external ear normal.      Nose: Congestion present.      Mouth/Throat:      Mouth: Mucous membranes are moist.      Pharynx: Oropharynx is clear.   Eyes:      Extraocular Movements: Extraocular movements intact.      Conjunctiva/sclera: Conjunctivae normal.      Pupils: Pupils are equal, round, and reactive to light.   Cardiovascular:      Rate and Rhythm: Normal rate.   Pulmonary:      Effort: Pulmonary effort is normal.   Skin:     General: Skin is warm and dry.   Neurological:      General: No focal deficit present.      Mental Status: He is alert and oriented to person, place, and time.   Psychiatric:         Mood and Affect: Mood normal.         Behavior: Behavior normal.         Thought Content: Thought content normal.         Procedures    Point of Care Test & Imaging Results from this visit  No results found for this visit on 04/11/25.   Imaging  No results " found.    Cardiology, Vascular, and Other Imaging  No other imaging results found for the past 2 days      Diagnostic study results (if any) were reviewed by LJ Ruiz.    Assessment/Plan   Allergies, medications, history, and pertinent labs/EKGs/Imaging reviewed by LJ Ruiz.     Medical Decision Making  Urgent Care Course: Pt presents to the  with rhinorrhea, cough, sinus congestion.  Patient is afebrile, hemodynamically stable.  Patient denies fever, n/v, cp, sob, ab pain, dysuria, and diarrhea.  Low suspicion for pneumonia/bronchitis given that patient's lungs are clear to auscultation bilaterally.  Given longevity of symptoms, will treat patient for sinusitis with   Augmentin.  Patient given sinusitis and pneumonia warning signs, prescriptions and will f/u with pcp.    Independent Hx provided by: Patient  Social determinant that may affects healthcare: Pharmacy closed  Pt's case/impression summarized and discussed with: Patient  Likely Dx given clinical picture: Sinusitis   Although not an exhaustive list of Differential Diagnosis (though considered), patient's HPI, PE, and other findings are not suggestive of: Sinusitis, URI, bronchitis, pneumonia, Covid-19, influenza, asthma exacerbation   Patient at time of discharge was clinically well-appearing and HDS for outpatient management. The patient and/or family was given the opportunity to ask questions prior to discharge, understood my verbal discussion of the plans for treatment, expected course, indications to return to ED, and the need for timely follow up as directed.    Condition: Stable      Orders and Diagnoses  Diagnoses and all orders for this visit:  Acute non-recurrent frontal sinusitis  -     amoxicillin-pot clavulanate (Augmentin) 875-125 mg tablet; Take 1 tablet by mouth 2 times a day for 10 days.      Medical Admin Record      Patient disposition: Home    Electronically signed by LJ Ruiz  5:47  PM

## 2025-04-15 ENCOUNTER — APPOINTMENT (OUTPATIENT)
Dept: ALLERGY | Facility: CLINIC | Age: 55
End: 2025-04-15
Payer: COMMERCIAL

## 2025-04-15 DIAGNOSIS — J30.2 SEASONAL ALLERGIES: ICD-10-CM

## 2025-04-15 PROCEDURE — 95117 IMMUNOTHERAPY INJECTIONS: CPT | Performed by: ALLERGY & IMMUNOLOGY

## 2025-05-06 ENCOUNTER — APPOINTMENT (OUTPATIENT)
Dept: ALLERGY | Facility: CLINIC | Age: 55
End: 2025-05-06
Payer: COMMERCIAL

## 2025-05-06 VITALS — HEIGHT: 69 IN | WEIGHT: 220 LBS | BODY MASS INDEX: 32.58 KG/M2

## 2025-05-06 DIAGNOSIS — J30.2 SEASONAL ALLERGIES: ICD-10-CM

## 2025-05-06 PROCEDURE — 95117 IMMUNOTHERAPY INJECTIONS: CPT | Performed by: ALLERGY & IMMUNOLOGY

## 2025-05-06 RX ORDER — USTEKINUMAB 90 MG/ML
90 INJECTION, SOLUTION SUBCUTANEOUS
COMMUNITY

## 2025-05-06 NOTE — PREPROCEDURE INSTRUCTIONS

## 2025-05-12 ENCOUNTER — ANESTHESIA EVENT (OUTPATIENT)
Dept: OPERATING ROOM | Facility: HOSPITAL | Age: 55
End: 2025-05-12
Payer: COMMERCIAL

## 2025-05-12 ENCOUNTER — HOSPITAL ENCOUNTER (OUTPATIENT)
Facility: HOSPITAL | Age: 55
Setting detail: OUTPATIENT SURGERY
Discharge: HOME | End: 2025-05-12
Attending: OTOLARYNGOLOGY | Admitting: OTOLARYNGOLOGY
Payer: COMMERCIAL

## 2025-05-12 ENCOUNTER — ANESTHESIA (OUTPATIENT)
Dept: OPERATING ROOM | Facility: HOSPITAL | Age: 55
End: 2025-05-12
Payer: COMMERCIAL

## 2025-05-12 VITALS
HEART RATE: 80 BPM | OXYGEN SATURATION: 95 % | DIASTOLIC BLOOD PRESSURE: 73 MMHG | RESPIRATION RATE: 12 BRPM | SYSTOLIC BLOOD PRESSURE: 137 MMHG | TEMPERATURE: 96.8 F

## 2025-05-12 DIAGNOSIS — H72.90 PERFORATION OF TYMPANIC MEMBRANE, UNSPECIFIED LATERALITY: Primary | ICD-10-CM

## 2025-05-12 DIAGNOSIS — G89.18 POST-OPERATIVE PAIN: ICD-10-CM

## 2025-05-12 DIAGNOSIS — H71.22 CHOLESTEATOMA OF LEFT MIDDLE EAR AND MASTOID: ICD-10-CM

## 2025-05-12 DIAGNOSIS — H66.3X2 OTHER CHRONIC SUPPURATIVE OTITIS MEDIA OF LEFT EAR: ICD-10-CM

## 2025-05-12 PROBLEM — E05.90 HYPERTHYROIDISM: Status: ACTIVE | Noted: 2025-05-12

## 2025-05-12 PROCEDURE — C1763 CONN TISS, NON-HUMAN: HCPCS | Performed by: OTOLARYNGOLOGY

## 2025-05-12 PROCEDURE — 88305 TISSUE EXAM BY PATHOLOGIST: CPT | Mod: TC,SUR | Performed by: OTOLARYNGOLOGY

## 2025-05-12 PROCEDURE — 2500000004 HC RX 250 GENERAL PHARMACY W/ HCPCS (ALT 636 FOR OP/ED): Performed by: OTOLARYNGOLOGY

## 2025-05-12 PROCEDURE — 7100000001 HC RECOVERY ROOM TIME - INITIAL BASE CHARGE: Performed by: OTOLARYNGOLOGY

## 2025-05-12 PROCEDURE — 2500000002 HC RX 250 W HCPCS SELF ADMINISTERED DRUGS (ALT 637 FOR MEDICARE OP, ALT 636 FOR OP/ED): Performed by: OTOLARYNGOLOGY

## 2025-05-12 PROCEDURE — 2720000007 HC OR 272 NO HCPCS: Performed by: OTOLARYNGOLOGY

## 2025-05-12 PROCEDURE — A69642 PR TYMPANOPLAS/MASTOIDEC,REBLD OSSICLES: Performed by: ANESTHESIOLOGIST ASSISTANT

## 2025-05-12 PROCEDURE — 2780000003 HC OR 278 NO HCPCS: Performed by: OTOLARYNGOLOGY

## 2025-05-12 PROCEDURE — 3700000002 HC GENERAL ANESTHESIA TIME - EACH INCREMENTAL 1 MINUTE: Performed by: OTOLARYNGOLOGY

## 2025-05-12 PROCEDURE — 7100000009 HC PHASE TWO TIME - INITIAL BASE CHARGE: Performed by: OTOLARYNGOLOGY

## 2025-05-12 PROCEDURE — 3600000003 HC OR TIME - INITIAL BASE CHARGE - PROCEDURE LEVEL THREE: Performed by: OTOLARYNGOLOGY

## 2025-05-12 PROCEDURE — 2500000005 HC RX 250 GENERAL PHARMACY W/O HCPCS: Performed by: ANESTHESIOLOGIST ASSISTANT

## 2025-05-12 PROCEDURE — 2500000005 HC RX 250 GENERAL PHARMACY W/O HCPCS: Performed by: OTOLARYNGOLOGY

## 2025-05-12 PROCEDURE — 2500000004 HC RX 250 GENERAL PHARMACY W/ HCPCS (ALT 636 FOR OP/ED): Mod: JZ | Performed by: ANESTHESIOLOGIST ASSISTANT

## 2025-05-12 PROCEDURE — 7100000010 HC PHASE TWO TIME - EACH INCREMENTAL 1 MINUTE: Performed by: OTOLARYNGOLOGY

## 2025-05-12 PROCEDURE — A69642 PR TYMPANOPLAS/MASTOIDEC,REBLD OSSICLES: Performed by: ANESTHESIOLOGY

## 2025-05-12 PROCEDURE — 2500000002 HC RX 250 W HCPCS SELF ADMINISTERED DRUGS (ALT 637 FOR MEDICARE OP, ALT 636 FOR OP/ED): Performed by: ANESTHESIOLOGIST ASSISTANT

## 2025-05-12 PROCEDURE — 3700000001 HC GENERAL ANESTHESIA TIME - INITIAL BASE CHARGE: Performed by: OTOLARYNGOLOGY

## 2025-05-12 PROCEDURE — C1729 CATH, DRAINAGE: HCPCS | Performed by: OTOLARYNGOLOGY

## 2025-05-12 PROCEDURE — 69644 REVISE MIDDLE EAR & MASTOID: CPT | Performed by: OTOLARYNGOLOGY

## 2025-05-12 PROCEDURE — 3600000008 HC OR TIME - EACH INCREMENTAL 1 MINUTE - PROCEDURE LEVEL THREE: Performed by: OTOLARYNGOLOGY

## 2025-05-12 PROCEDURE — 7100000002 HC RECOVERY ROOM TIME - EACH INCREMENTAL 1 MINUTE: Performed by: OTOLARYNGOLOGY

## 2025-05-12 PROCEDURE — 2500000004 HC RX 250 GENERAL PHARMACY W/ HCPCS (ALT 636 FOR OP/ED): Performed by: ANESTHESIOLOGY

## 2025-05-12 DEVICE — BIODESIGN DURAPLASTY GRAFT
Type: IMPLANTABLE DEVICE | Site: EAR | Status: FUNCTIONAL
Brand: BIODESIGN

## 2025-05-12 RX ORDER — FENTANYL CITRATE 50 UG/ML
50 INJECTION, SOLUTION INTRAMUSCULAR; INTRAVENOUS EVERY 5 MIN PRN
Status: DISCONTINUED | OUTPATIENT
Start: 2025-05-12 | End: 2025-05-12 | Stop reason: HOSPADM

## 2025-05-12 RX ORDER — ONDANSETRON HYDROCHLORIDE 2 MG/ML
4 INJECTION, SOLUTION INTRAVENOUS ONCE AS NEEDED
Status: DISCONTINUED | OUTPATIENT
Start: 2025-05-12 | End: 2025-05-12 | Stop reason: HOSPADM

## 2025-05-12 RX ORDER — OXYCODONE HYDROCHLORIDE 5 MG/1
5 TABLET ORAL EVERY 4 HOURS PRN
Status: DISCONTINUED | OUTPATIENT
Start: 2025-05-12 | End: 2025-05-12 | Stop reason: HOSPADM

## 2025-05-12 RX ORDER — LIDOCAINE HCL/PF 100 MG/5ML
SYRINGE (ML) INTRAVENOUS AS NEEDED
Status: DISCONTINUED | OUTPATIENT
Start: 2025-05-12 | End: 2025-05-12

## 2025-05-12 RX ORDER — ACETAMINOPHEN 10 MG/ML
INJECTION, SOLUTION INTRAVENOUS AS NEEDED
Status: DISCONTINUED | OUTPATIENT
Start: 2025-05-12 | End: 2025-05-12

## 2025-05-12 RX ORDER — PHENYLEPHRINE 10 MG/250 ML(40 MCG/ML)IN 0.9 % SOD.CHLORIDE INTRAVENOUS
CONTINUOUS PRN
Status: DISCONTINUED | OUTPATIENT
Start: 2025-05-12 | End: 2025-05-12

## 2025-05-12 RX ORDER — SCOPOLAMINE 1 MG/3D
1 PATCH, EXTENDED RELEASE TRANSDERMAL
Status: DISCONTINUED | OUTPATIENT
Start: 2025-05-12 | End: 2025-05-12 | Stop reason: HOSPADM

## 2025-05-12 RX ORDER — MEPERIDINE HYDROCHLORIDE 25 MG/ML
12.5 INJECTION INTRAMUSCULAR; INTRAVENOUS; SUBCUTANEOUS EVERY 10 MIN PRN
Status: DISCONTINUED | OUTPATIENT
Start: 2025-05-12 | End: 2025-05-12 | Stop reason: HOSPADM

## 2025-05-12 RX ORDER — CEPHALEXIN 500 MG/1
500 CAPSULE ORAL 3 TIMES DAILY
Qty: 9 CAPSULE | Refills: 0 | Status: SHIPPED | OUTPATIENT
Start: 2025-05-12 | End: 2025-05-15

## 2025-05-12 RX ORDER — CIPROFLOXACIN AND DEXAMETHASONE 3; 1 MG/ML; MG/ML
4 SUSPENSION/ DROPS AURICULAR (OTIC) 2 TIMES DAILY
Qty: 7.5 ML | Refills: 1 | Status: SHIPPED | OUTPATIENT
Start: 2025-05-12

## 2025-05-12 RX ORDER — TRAMADOL HYDROCHLORIDE 50 MG/1
50 TABLET, FILM COATED ORAL EVERY 6 HOURS PRN
Qty: 15 TABLET | Refills: 0 | Status: SHIPPED | OUTPATIENT
Start: 2025-05-12 | End: 2025-05-17

## 2025-05-12 RX ORDER — MIDAZOLAM HYDROCHLORIDE 1 MG/ML
INJECTION INTRAMUSCULAR; INTRAVENOUS AS NEEDED
Status: DISCONTINUED | OUTPATIENT
Start: 2025-05-12 | End: 2025-05-12

## 2025-05-12 RX ORDER — NORETHINDRONE AND ETHINYL ESTRADIOL 0.5-0.035
KIT ORAL AS NEEDED
Status: DISCONTINUED | OUTPATIENT
Start: 2025-05-12 | End: 2025-05-12

## 2025-05-12 RX ORDER — PROPOFOL 10 MG/ML
INJECTION, EMULSION INTRAVENOUS AS NEEDED
Status: DISCONTINUED | OUTPATIENT
Start: 2025-05-12 | End: 2025-05-12

## 2025-05-12 RX ORDER — ALBUTEROL SULFATE 0.83 MG/ML
2.5 SOLUTION RESPIRATORY (INHALATION) ONCE AS NEEDED
Status: DISCONTINUED | OUTPATIENT
Start: 2025-05-12 | End: 2025-05-12 | Stop reason: HOSPADM

## 2025-05-12 RX ORDER — CIPROFLOXACIN AND DEXAMETHASONE 3; 1 MG/ML; MG/ML
SUSPENSION/ DROPS AURICULAR (OTIC) AS NEEDED
Status: DISCONTINUED | OUTPATIENT
Start: 2025-05-12 | End: 2025-05-12 | Stop reason: HOSPADM

## 2025-05-12 RX ORDER — SODIUM CHLORIDE, SODIUM LACTATE, POTASSIUM CHLORIDE, CALCIUM CHLORIDE 600; 310; 30; 20 MG/100ML; MG/100ML; MG/100ML; MG/100ML
100 INJECTION, SOLUTION INTRAVENOUS CONTINUOUS
Status: DISCONTINUED | OUTPATIENT
Start: 2025-05-12 | End: 2025-05-12 | Stop reason: HOSPADM

## 2025-05-12 RX ORDER — LIDOCAINE HYDROCHLORIDE AND EPINEPHRINE 10; 10 UG/ML; MG/ML
INJECTION, SOLUTION INFILTRATION; PERINEURAL AS NEEDED
Status: DISCONTINUED | OUTPATIENT
Start: 2025-05-12 | End: 2025-05-12 | Stop reason: HOSPADM

## 2025-05-12 RX ORDER — BACITRACIN 500 [USP'U]/G
OINTMENT TOPICAL AS NEEDED
Status: DISCONTINUED | OUTPATIENT
Start: 2025-05-12 | End: 2025-05-12 | Stop reason: HOSPADM

## 2025-05-12 RX ORDER — SODIUM CHLORIDE 0.9 G/100ML
INJECTION, SOLUTION IRRIGATION AS NEEDED
Status: DISCONTINUED | OUTPATIENT
Start: 2025-05-12 | End: 2025-05-12 | Stop reason: HOSPADM

## 2025-05-12 RX ORDER — ONDANSETRON HYDROCHLORIDE 2 MG/ML
INJECTION, SOLUTION INTRAVENOUS AS NEEDED
Status: DISCONTINUED | OUTPATIENT
Start: 2025-05-12 | End: 2025-05-12

## 2025-05-12 RX ORDER — APREPITANT 40 MG/1
CAPSULE ORAL AS NEEDED
Status: DISCONTINUED | OUTPATIENT
Start: 2025-05-12 | End: 2025-05-12

## 2025-05-12 RX ORDER — LIDOCAINE HYDROCHLORIDE 10 MG/ML
0.1 INJECTION, SOLUTION INFILTRATION; PERINEURAL ONCE
Status: DISCONTINUED | OUTPATIENT
Start: 2025-05-12 | End: 2025-05-12 | Stop reason: HOSPADM

## 2025-05-12 RX ORDER — GLYCOPYRROLATE 0.2 MG/ML
INJECTION INTRAMUSCULAR; INTRAVENOUS AS NEEDED
Status: DISCONTINUED | OUTPATIENT
Start: 2025-05-12 | End: 2025-05-12

## 2025-05-12 RX ORDER — DIPHENHYDRAMINE HYDROCHLORIDE 50 MG/ML
INJECTION, SOLUTION INTRAMUSCULAR; INTRAVENOUS AS NEEDED
Status: DISCONTINUED | OUTPATIENT
Start: 2025-05-12 | End: 2025-05-12

## 2025-05-12 RX ORDER — HYDRALAZINE HYDROCHLORIDE 20 MG/ML
10 INJECTION INTRAMUSCULAR; INTRAVENOUS EVERY 30 MIN PRN
Status: DISCONTINUED | OUTPATIENT
Start: 2025-05-12 | End: 2025-05-12 | Stop reason: HOSPADM

## 2025-05-12 RX ORDER — CEFAZOLIN 1 G/1
INJECTION, POWDER, FOR SOLUTION INTRAVENOUS AS NEEDED
Status: DISCONTINUED | OUTPATIENT
Start: 2025-05-12 | End: 2025-05-12

## 2025-05-12 RX ORDER — METOCLOPRAMIDE HYDROCHLORIDE 5 MG/ML
10 INJECTION INTRAMUSCULAR; INTRAVENOUS ONCE AS NEEDED
Status: DISCONTINUED | OUTPATIENT
Start: 2025-05-12 | End: 2025-05-12 | Stop reason: HOSPADM

## 2025-05-12 RX ORDER — PHENYLEPHRINE HCL IN 0.9% NACL 1 MG/10 ML
SYRINGE (ML) INTRAVENOUS AS NEEDED
Status: DISCONTINUED | OUTPATIENT
Start: 2025-05-12 | End: 2025-05-12

## 2025-05-12 RX ADMIN — Medication 120 MCG: at 13:52

## 2025-05-12 RX ADMIN — PHENYLEPHRINE-NACL IV SOLUTION 10 MG/250ML-0.9% 0.3 MCG/KG/MIN: 10-0.9/25 SOLUTION at 13:50

## 2025-05-12 RX ADMIN — SCOPOLAMINE 1 PATCH: 1.5 PATCH, EXTENDED RELEASE TRANSDERMAL at 11:41

## 2025-05-12 RX ADMIN — ACETAMINOPHEN 1000 MG: 10 INJECTION, SOLUTION INTRAVENOUS at 13:47

## 2025-05-12 RX ADMIN — MIDAZOLAM HYDROCHLORIDE 1 MG: 2 INJECTION, SOLUTION INTRAMUSCULAR; INTRAVENOUS at 13:17

## 2025-05-12 RX ADMIN — REMIFENTANIL HYDROCHLORIDE 100 MCG: 1 INJECTION, POWDER, LYOPHILIZED, FOR SOLUTION INTRAVENOUS at 13:26

## 2025-05-12 RX ADMIN — Medication 80 MCG: at 14:13

## 2025-05-12 RX ADMIN — DEXAMETHASONE SODIUM PHOSPHATE 10 MG: 4 INJECTION, SOLUTION INTRA-ARTICULAR; INTRALESIONAL; INTRAMUSCULAR; INTRAVENOUS; SOFT TISSUE at 13:28

## 2025-05-12 RX ADMIN — MIDAZOLAM HYDROCHLORIDE 1 MG: 2 INJECTION, SOLUTION INTRAMUSCULAR; INTRAVENOUS at 13:14

## 2025-05-12 RX ADMIN — CEFAZOLIN 2 G: 1 INJECTION, POWDER, FOR SOLUTION INTRAMUSCULAR; INTRAVENOUS at 13:37

## 2025-05-12 RX ADMIN — DIPHENHYDRAMINE HYDROCHLORIDE 25 MG: 50 INJECTION INTRAMUSCULAR; INTRAVENOUS at 13:13

## 2025-05-12 RX ADMIN — EPHEDRINE SULFATE 25 MG: 50 INJECTION INTRAVENOUS at 14:15

## 2025-05-12 RX ADMIN — PROPOFOL 50 MCG/KG/MIN: 10 INJECTION, EMULSION INTRAVENOUS at 13:37

## 2025-05-12 RX ADMIN — ONDANSETRON 4 MG: 2 INJECTION INTRAMUSCULAR; INTRAVENOUS at 15:06

## 2025-05-12 RX ADMIN — PROPOFOL 200 MG: 10 INJECTION, EMULSION INTRAVENOUS at 13:26

## 2025-05-12 RX ADMIN — SUGAMMADEX 200 MG: 100 INJECTION, SOLUTION INTRAVENOUS at 15:15

## 2025-05-12 RX ADMIN — GLYCOPYRROLATE 0.2 MG: 0.2 INJECTION, SOLUTION INTRAMUSCULAR; INTRAVENOUS at 14:13

## 2025-05-12 RX ADMIN — SODIUM CHLORIDE, SODIUM LACTATE, POTASSIUM CHLORIDE, AND CALCIUM CHLORIDE: 600; 310; 30; 20 INJECTION, SOLUTION INTRAVENOUS at 13:22

## 2025-05-12 RX ADMIN — LIDOCAINE HYDROCHLORIDE 100 MG: 20 INJECTION INTRAVENOUS at 13:26

## 2025-05-12 RX ADMIN — APREPITANT 40 MG: 40 CAPSULE ORAL at 13:09

## 2025-05-12 RX ADMIN — EPHEDRINE SULFATE 5 MG: 50 INJECTION INTRAVENOUS at 14:13

## 2025-05-12 RX ADMIN — DIPHENHYDRAMINE HYDROCHLORIDE 25 MG: 50 INJECTION INTRAMUSCULAR; INTRAVENOUS at 13:17

## 2025-05-12 SDOH — HEALTH STABILITY: MENTAL HEALTH: CURRENT SMOKER: 0

## 2025-05-12 ASSESSMENT — PAIN - FUNCTIONAL ASSESSMENT
PAIN_FUNCTIONAL_ASSESSMENT: 0-10

## 2025-05-12 ASSESSMENT — PAIN SCALES - GENERAL
PAINLEVEL_OUTOF10: 0 - NO PAIN
PAINLEVEL_OUTOF10: 4
PAINLEVEL_OUTOF10: 0 - NO PAIN

## 2025-05-12 NOTE — H&P
History Of Present Illness  Mau Cisse is a 55 y.o. male presenting for left tympanomastoidectomy.  He is status post left tympanoplasty,mastoidectomy, ossiculoplasty (TORP) by Dr Jamil Brizuela. The surgery was in June 2024.  Patient continues to have intermittent drainage from his left ear. He has tried several courses of antibiotics, systemic and topical. It appears that those do help temporarily, but his symptoms recur after a period of time. He has tried even amoxicillin, which he thinks has helped the most. Hearing remains good.       Past Medical History  He has a past medical history of Abnormal ECG (09/16/2022), Arthritis, Bicuspid aortic valve, Cholesteatoma of attic, left ear, Cholesteatoma of middle ear and mastoid, CKD (chronic kidney disease), Coronary artery disease, CPAP (continuous positive airway pressure) dependence, Dilated aortic root (CMS-[facility]), Diverticulosis, GERD (gastroesophageal reflux disease), Gout, Heart murmur, History of PFTs (06/16/2023), HL (hearing loss), Hyperlipidemia, Hypertension, Lung nodules, Mild aortic valve regurgitation, Obesity, Personal history of infections of the central nervous system (02/2018), PONV (postoperative nausea and vomiting), Reactive airway disease ([facility]), Renal cancer (Multi) (2003), Renal mass, left, Shortness of breath, Sinusitis, Sleep apnea, Thoracic ascending aortic aneurysm (02/21/2024), Ulcerative colitis, and Vision loss.    Surgical History  He has a past surgical history that includes Nephrectomy (Right, 2005); Clavicle surgery (Left); Total hip arthroplasty (Bilateral, 05/2017); External ear surgery (Left, 07/09/2020); Finger surgery (Right, 07/09/2020); Other surgical history (07/09/2020); Rotator cuff repair (Right, 09/23/2022); Vasectomy; Colonoscopy; CT chest wo IV contrast (02/20/2024); US carotid doppler left (2018); Hernia repair; and Multiple tooth extractions.     Social History  He reports that he has quit smoking. His smoking  use included cigarettes. He has a 6 pack-year smoking history. He has never been exposed to tobacco smoke. He has never used smokeless tobacco. He reports current alcohol use of about 4.0 standard drinks of alcohol per week. He reports that he does not use drugs.    Family History  Family History[1]     Allergies  Adhesive tape-silicones, Codeine, Sulfa (sulfonamide antibiotics), Adhesive, Amlodipine besylate, Lisinopril, Sumatriptan, and Valsartan    Review of Systems     Physical Exam  CONSTITUTIONAL:  No acute distress  VOICE:  No hoarseness or other abnormality  RESPIRATION:  Breathing comfortably, no stridor  CV:  No clubbing/cyanosis/edema in hands  EYES:  EOM intact, sclera clear  NEURO:  Alert and oriented times 3, Cranial nerves II-XII grossly intact and symmetric bilaterally  HEAD AND FACE:  Symmetric facial features, no masses or lesions  RIGHT EAR:  Normal external ear and post auricular area, no visible lesions, external auditory canal patent, tympanic membrane intact, no retraction, no signs of mass, effusion, or infection within the middle ear, small perforation posteriorly, no cholesteatoma, no infection, there is some evidence of myringosclerosis  LEFT EAR: Normal external ear and post auricular area, no visible lesions, external auditory canal patent, tympanic membrane intact, no retraction, no signs of mass, effusion, or infection within the middle ear, significant casting caused by trapped and dried powder, this was removed using the otomicroscope. The TM has central perforation raising a concern for secondary cholesteatoma  NOSE:  Anterior rhinoscopy clear, no significant external deformity.  ORAL CAVITY/OROPHARYNX/LIPS:  normal lining, mobile tongue.  PHARYNGEAL WALLS: Moist mucosal lining, good palatal elevation  NECK/LYMPH:  No LAD, no thyroid masses, trachea midline  SKIN:  Neck and facial skin is without scar or injury  PSYCH:  Alert and oriented with appropriate mood and affect        Last  Recorded Vitals  Blood pressure 132/84, pulse 70, temperature 36.6 °C (97.9 °F), temperature source Temporal, resp. rate 18, SpO2 98%.    Relevant Results        Scheduled medications  Scheduled Medications[2]  Continuous medications  Continuous Medications[3]  PRN medications  PRN Medications[4]       Assessment/Plan   Assessment & Plan  Chronic otitis media    Hyperthyroidism    Cholesteatoma of middle ear and mastoid  Assessment:  Bilateral chronic otitis media   Left chronic myringitis  History of left cholesteatoma   Status post tympanoplasty, mastoidectomy and ossiculoplasty  Bilateral eustachian tube dysfunction   Recurrent left middle ear cholesteatoma   Left conductive hearing loss       Plan:  Left tympanomastoidectomy with Dr. Michele Chin,          [1]   Family History  Problem Relation Name Age of Onset    Hypertension Mother      Hypertension Father      Stroke Father     [2] scopolamine, 1 patch, transdermal, q72h    [3]    [4]

## 2025-05-12 NOTE — ASSESSMENT & PLAN NOTE
Assessment:  Bilateral chronic otitis media   Left chronic myringitis  History of left cholesteatoma   Status post tympanoplasty, mastoidectomy and ossiculoplasty  Bilateral eustachian tube dysfunction   Recurrent left middle ear cholesteatoma   Left conductive hearing loss

## 2025-05-12 NOTE — ANESTHESIA PREPROCEDURE EVALUATION
"Patient: Jamil Cisse \"Mau\"    Procedure Information       Date/Time: 25 1145    Procedure: Tympanomastoidectomy, middle ear and mastoid, possible cartilage graft. (Left)    Location: [facility] OR  [facility] OR    Surgeons: Candis Bautista MD     HPI:  54 y.o. male who is status post left tympanoplasty,mastoidectomy, ossiculoplasty (TORP) in 2024. The patient reports a recurrence of pain and drainage approximately two days ago. There is significant subjective improvement in hearing.Patient continues to have intermittent drainage from his left ear. He has tried several courses of antibiotics, systemic and topical. It appears that those do help temporarily, but his symptoms recur after a period of time. He has tried even amoxicillin, which he thinks has helped the most. Hearing remains good.    EK  Normal sinus rhythm 80  Normal ECG  When compared with ECG of 16-SEP-2022 08:37,  No significant change was found    ECHO:  2024   1. Left ventricular systolic function is normal with a 55-60% estimated ejection fraction.   2. There is moderate concentric left ventricular hypertrophy.   3. Normal RV size and function      RVSP could not be calculated as no significant TR Doppler envelope.   4. Normal mitral valve.   5. Aortic valve appears abnormal.   6. Probable bicuspid aortic valve which is densely calcified. The right coronary cusp leaflet is essentially immobile. There is mild aortic stenosis V-max 2.6 m/s mean gradient 18 mmHg valve area ~1.3 cm sq.  1+ eccentric AI.   7. Mild aortic valve regurgitation.   8. Minimally dilated aortic root 39 mm. Ascending aorta mildly dilated 42 mm.    Stress Test:2024  Normal exercise Myoview cardiac perfusion stress test.  No evidence of ischemia or myocardial infarction by perfusion imaging.  Normal left ventricular systolic function, ejection fraction 54%.  No exercise provoked significant ischemic ECG changes or chest " pain  symptoms. No comparison study was available.  Clinical correlation is advised..    PFT: 07/24/2024  The FEV1/FVC (0.82) is normal. The FEV1 (3.44L/99%) is normal. The FVC is normal. Following administration of bronchodilators, there is no significant response. The TLC by body plethysmography is normal. The DLCO is normal; however, the diffusing capacity was not corrected for the patient's hemoglobin. The airways resistance is normal. Conclusion: Normal spirometry. Normal lung volumes by plethysmography. The DLCO is normal.        6-minute walk test result: 07/24//2024  Patient walked walked 731 m / 2400 feet in 6 minutes (128% of the walk distance predicted).  Patient's heart rate went from 93 at rest to 105 at the end of the walk (63% at the peak heart rate predicted).  Patient SpO2 went from 93 on room air at rest to 95 at the end of the walk.  Patient dyspnea scale went from 0.5-2 and fatigue scale remained at 0.  Impression:-There is a good quality metabolic 6-minute walk study.     Relevant Problems   Anesthesia   (+) PONV (postoperative nausea and vomiting)      Cardiac   (+) Aneurysm of ascending aorta without rupture   (+) Bicuspid aortic valve   (+) HTN (hypertension)   (+) Heart murmur   (+) Hyperlipemia, mixed   (+) Nonrheumatic aortic valve insufficiency      Pulmonary   (+) WILFRID (obstructive sleep apnea)      Neuro   (+) Cervical radiculitis   (-) CVA (cerebral vascular accident) (Multi)   (-) Seizures (Multi)      GI   (+) Crohn's disease (Multi) (On a biologic agent )   (+) Crohn's disease of large intestine without complication (Multi)   (+) GERD (gastroesophageal reflux disease)   (+) Ulcerative colitis      Liver   (+) Elevated LFTs   (+) Fatty liver      Endocrine   (+) Class 1 obesity with serious comorbidity and body mass index (BMI) of 34.0 to 34.9 in adult   (+) Hyperthyroidism      Hematology   (+) Erythrocytosis      Musculoskeletal   (+) Degenerative arthritis of hip   (+) Displacement  of lumbar intervertebral disc without myelopathy   (+) Osteoarthritis of hip   (+) Osteoarthritis of spine with radiculopathy, cervical region   (+) Primary osteoarthritis of right hip      HEENT   (+) Maxillary sinusitis   (+) Mixed hearing loss, bilateral   (+) Seasonal allergies      ID  On a biologic agent for Crohn's colitis       Clinical information reviewed:   Tobacco  Allergies  Meds  Problems  Med Hx  Surg Hx   Fam Hx  Soc   Hx        NPO Detail:  No data recorded     Physical Exam    Airway  Mallampati: II  TM distance: >3 FB  Neck ROM: full     Cardiovascular   Rhythm: regular     Dental     (+) upper dentures       Pulmonary Breath sounds clear to auscultation     Abdominal            Anesthesia Plan    History of general anesthesia?: yes  History of complications of general anesthesia?: no    ASA 3     general     The patient is not a current smoker.    intravenous induction   Postoperative pain plan includes opioids.  Trial extubation is planned.  Anesthetic plan and risks discussed with patient and spouse.  Use of blood products discussed with patient and spouse who.    Plan discussed with [facility].

## 2025-05-12 NOTE — ANESTHESIA POSTPROCEDURE EVALUATION
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    Anesthesia Post Evaluation    Patient location during evaluation: PACU  Patient participation: complete - patient participated  Level of consciousness: sleepy but conscious  Pain management: adequate  Airway patency: patent  Cardiovascular status: acceptable  Respiratory status: acceptable  Hydration status: acceptable  Postoperative Nausea and Vomiting: none  Comments: Patient SV on 8 L/min O2 with SFM.  Patient VSS.  Report given to nurse.        No notable events documented.    "

## 2025-05-12 NOTE — ANESTHESIA PROCEDURE NOTES
Airway  Date/Time: 5/12/2025 1:29 PM  Reason: elective    Airway not difficult    Staffing  Performed: LAQUITA   Authorized by: Alex Lindsey MD    Performed by: LAQUITA Mcguire  Patient location during procedure: OR    Patient Condition  Indications for airway management: anesthesia  Patient position: sniffing  Sedation level: deep     Final Airway Details   Preoxygenated: yes  Final airway type: endotracheal airway  Successful airway: ETT  Cuffed: yes   Successful intubation technique: video laryngoscopy  Adjuncts used in placement: intubating stylet  Blade: Leni  Blade size: #4  ETT size (mm): 7.5  Cormack-Lehane Classification: grade I - full view of glottis  Placement verified by: chest auscultation and capnometry   Measured from: lips  ETT to lips (cm): 20  Ventilation between attempts: BVM  Number of attempts at approach: 1    Additional Comments  Lips and teeth in preop condition.

## 2025-05-12 NOTE — DISCHARGE INSTRUCTIONS
Postoperative Care Instructions:  Stapedectomy or Stapedotomy, Ossiculoplasty, or Transcanal Tympanoplasty    Postoperative Care:    One day after surgery, remove the cotton ball in your ear. Ear drops should be started one week after surgery and used twice a day in the ear that had surgery.  A cotton ball can be placed in the ear after placing drops in your ear if desired, but this is not necessary.  You may shower the day after surgery, but make sure to place a fresh cotton ball coated in Vaseline in the ear to keep it dry - this is very important.   Change these cotton balls as frequently as necessary to make sure your ear stays dry until healing is complete.  Do not completely submerge your ear under water until cleared to do so by Dr. Bautista.      Avoid blowing your nose, lifting objects heavier than a jug of milk, or straining for any reason until your follow up appointment.   Sneeze with your mouth open.  Sleep with your head elevated for two days.  Avoid any airplane travel until your follow up appointment.  Take your oral antibiotics as prescribed after surgery and take your pain medications only as prescribed and only as needed for moderate to severe pain.    Please call Dr. Bautista's office at [phone] as soon as possible to make a follow up appointment in 3 - 5 weeks.    What to Expect Following Surgery:    The following are some symptoms that may follow your recent ear surgery:    Hearing - Although your hearing may have been better immediately following surgery, it may worsen some as the healing process occurs.  You also have a small cotton ball bandage deep in your ear canal up against your ear drum which will make your hearing worse temporarily, and this needs to be removed by Dr. Bautista at your follow up appointment before the results of your surgery can be appreciated.     Taste disturbance and mouth dryness - These symptoms may occur for a few weeks following ear surgery, and are usually  temporary, but can be prolonged in rare cases.   Taste disturbance usually presents as a metallic taste in the mouth, but can come in other forms as well.    Tinnitus - Tinnitus (head noise or ringing in the ears), frequently present before surgery, is very common after surgery, but is usually temporary.  This can persist for one to two months and then may decrease in intensity as your hearing improves.  However, if your hearing is unimproved or worse after surgery, the tinnitus may persist or be worse as well.    Jaw symptoms - The front wall of the ear canal is the same as the back wall of the jaw joint, and therefore some temporary discomfort with jaw movement is common after ear surgery.  It usually subsides within one to two months.    Drainage from the ear - Some blood-tinged drainage from the ear canal may occur for 24 to 48 hours after surgery.  Please call if bloody drainage persists for more than 48 hours.

## 2025-05-12 NOTE — OP NOTE
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  Intraoperative facial nerve monitoring.  Microsurgical dissection techniques requiring use of operating microscope.    SURGEON: Candis Bautista MD    ASSISTANT SURGEON: Caleb Chin DO PGY-3    ANESTHESIA:  General with endotracheal intubation    ESTIMATED BLOOD LOSS: 2 mL    HISTORY:  Mau Cisse is a 55 y.o. male who is status post left tympanoplasty,mastoidectomy, ossiculoplasty (TORP) by Dr Jamil Brizuela. The surgery was in June 2024.  Post operatively, the he continued to have intermittent drainage from his left ear. He has tried several courses of antibiotics, systemic and topical. It appears that those do help temporarily, but his symptoms recur after a period of time. He has tried even amoxicillin, which he thinks has helped the most. Hearing remains good. Examination revealed the tympanic membrane has central perforation raising a concern for secondary cholesteatoma . Audiometric evaluation revealed a left-sided conductive hearing loss. Treatment options were discussed in length. Given the physical and audiometric findings, a left tympanomastoidectomy was offered. We discussed the transcanal and post-auricular approaches. No guarantees were made that the perforation will heal. The risks, alternatives and benefits were all discussed. The risks outlined included but were not limited to bleeding, surgical site infection, hearing loss, dizziness, tinnitus, taste disturbance, graft failure and rarely facial paralysis.    OPERATIVE FINDINGS:  Examination of the tympanic membrane and middle ear revealed:  a 20% central perforation, Cholesteatoma underlying the posterior-inferior portion of the tympanic membrane. The previous cartilage graft was in place and adherent to the tympanic membrane.  However, the previous prosthetic was not in continuity with the stapes footplate independent primary. A 6.0 mm TORP precise titanium, manufactured by [employer], [place], [place] was placed covered with a cartilage graft.  Examination of the mastoid revealed: Significant fibrotic tissue and scarring from previous mastoidectomy. There was no further evidence of cholesteatoma.       OPERATIVE PROCEDURE:  The patient was evaluated in the pre-operative area. The correct site of surgery was marked, and the informed consent was signed. The patient was taken to the operative suite and laid on the operating table. A time out was performed according to the protocol. After induction of anesthesia, endotracheal intubation was done. Intraoperative intravenous antibiotics were administered. The table was tilted 180 degrees and the surgical site was exposed. An appropriate amount of hair was clipped from the post-auricular region. The electrodes of the facial nerve monitoring system were inserted percutaneously in their corresponding position and adequate functioning of the system was confirmed. The patient was supported to the surgical bed with tapes and belts. A grounding pad was applied. The skin was prepped and draped using the standard sterile techniques.  Local anesthetics using a mixture of 1% lidocaine and 1/100.000 epinephrine was infiltrated in the post-auricular region.     Next, the operative microscope was brought into the field and utilized throughout the case to provide high-powered magnification. An appropriately sized ear speculum was placed in the ear canal. The pathological findings of the middle ear were confirmed. Four-quadrant canal injections were then carried out. Next, the vascular strip was outlined and incised. The  post-auricular incision was completed and the mastoid pericranium was incised and elevated off in order to expose the mastoid. The vascular strip was then elevated from behind, folded and tucked underneath a self-retaining retractor. Next, the tympanomeatal flap was elevated and the cholesteatoma was exposed in the middle ear and removed. The collected specimen was sent in formalin for pathological examination.  To ensure there were no other areas of re-occurrence, the previous mastoidectomy was re-exposed using a combination of cutting and carroll burrs. There was no further evidence of cholesteatoma reoccurrence.     Attention was turned back to the transcanal view. The previous prosthetic was displaced.  It was removed.    Using a measuring over the sizer I chose to use a 6.0 mm total ossicular chain replacement prosthesis. Due to the previous surgery, there was not enough viable temporalis fascia and pericranium to be used for the tympanic membrane reconstruction. I then chose to use a bio design graft, used a 2.5 x 2.5 mm graft trimmed it accordingly.  It was placed in a medial fashion previously noted perforation. The previous cartilage graft was still adherent to the tympanic membrane. Next the prosthesis placed onto the footplate of the stapes.  The tympanic membrane was then laid on top of the prosthetic.  It was ensured that the previously placed cartilage graft was overlying the head of the prosthetic. Gelfoam was placed underneath and on top of the graft to help support the and then attention was switched to closed the pericranium with interrupted 3-0 Vicryl.  Post auricle incision was also closed with multilayered fashion using 3-0 Vicryl.  Mastisol Steri-Strips were placed over incision. The ear canal was inspected again to ensure the vascular strip was in a proper location and the canal was filled with ointment. The wound was then covered with a cotton ball, Telfa, and a Travis dressing.     All  needle counts and sponges were correct. The table was tilted backwards towards the anesthesiologist and the patient was allowed to be awaked and transferred to recovery. I was present and supervised the entire procedure.

## 2025-05-13 ENCOUNTER — APPOINTMENT (OUTPATIENT)
Dept: ALLERGY | Facility: CLINIC | Age: 55
End: 2025-05-13
Payer: COMMERCIAL

## 2025-05-19 ENCOUNTER — APPOINTMENT (OUTPATIENT)
Facility: CLINIC | Age: 55
End: 2025-05-19
Payer: COMMERCIAL

## 2025-05-21 ENCOUNTER — TELEPHONE (OUTPATIENT)
Dept: PRIMARY CARE | Facility: CLINIC | Age: 55
End: 2025-05-21
Payer: COMMERCIAL

## 2025-05-21 DIAGNOSIS — R68.82 DECREASED LIBIDO: Primary | ICD-10-CM

## 2025-05-21 DIAGNOSIS — R79.89 ABNORMAL SERUM TESTOSTERONE LEVEL: ICD-10-CM

## 2025-05-21 NOTE — TELEPHONE ENCOUNTER
Advise pt that testosterone level was already in the system and the testosterone was added.    Needs to make sure the lab draws both tests when he goes.

## 2025-05-22 ENCOUNTER — TELEPHONE (OUTPATIENT)
Facility: CLINIC | Age: 55
End: 2025-05-22
Payer: COMMERCIAL

## 2025-05-22 LAB
PSA FREE MFR SERPL: 20 % (CALC)
PSA FREE SERPL-MCNC: 1.2 NG/ML
PSA SERPL-MCNC: 6 NG/ML

## 2025-05-22 NOTE — TELEPHONE ENCOUNTER
LMOM for patient.    Has an OV with  6/4/25.    Provider will be out of office.    Needs to R/S.    Office number given on VM.

## 2025-05-26 DIAGNOSIS — R97.20 INCREASED PROSTATE SPECIFIC ANTIGEN (PSA) VELOCITY: ICD-10-CM

## 2025-05-27 ENCOUNTER — HOSPITAL ENCOUNTER (OUTPATIENT)
Dept: RADIOLOGY | Facility: HOSPITAL | Age: 55
Discharge: HOME | End: 2025-05-27
Payer: COMMERCIAL

## 2025-05-27 DIAGNOSIS — J84.9 ILD (INTERSTITIAL LUNG DISEASE) (MULTI): ICD-10-CM

## 2025-05-27 PROCEDURE — 71250 CT THORAX DX C-: CPT

## 2025-06-02 ENCOUNTER — APPOINTMENT (OUTPATIENT)
Facility: CLINIC | Age: 55
End: 2025-06-02
Payer: COMMERCIAL

## 2025-06-02 ENCOUNTER — OFFICE VISIT (OUTPATIENT)
Facility: CLINIC | Age: 55
End: 2025-06-02
Payer: COMMERCIAL

## 2025-06-02 VITALS — BODY MASS INDEX: 32.44 KG/M2 | WEIGHT: 219 LBS | HEIGHT: 69 IN

## 2025-06-02 DIAGNOSIS — H71.22 CHOLESTEATOMA OF LEFT MIDDLE EAR AND MASTOID: Primary | ICD-10-CM

## 2025-06-02 PROCEDURE — 99024 POSTOP FOLLOW-UP VISIT: CPT | Performed by: OTOLARYNGOLOGY

## 2025-06-02 PROCEDURE — 1036F TOBACCO NON-USER: CPT | Performed by: OTOLARYNGOLOGY

## 2025-06-02 PROCEDURE — 3008F BODY MASS INDEX DOCD: CPT | Performed by: OTOLARYNGOLOGY

## 2025-06-03 ENCOUNTER — APPOINTMENT (OUTPATIENT)
Dept: PRIMARY CARE | Facility: CLINIC | Age: 55
End: 2025-06-03
Payer: COMMERCIAL

## 2025-06-03 VITALS
WEIGHT: 221.3 LBS | HEIGHT: 69 IN | BODY MASS INDEX: 32.78 KG/M2 | SYSTOLIC BLOOD PRESSURE: 130 MMHG | HEART RATE: 96 BPM | TEMPERATURE: 97.7 F | DIASTOLIC BLOOD PRESSURE: 78 MMHG | OXYGEN SATURATION: 95 %

## 2025-06-03 DIAGNOSIS — J84.9 ILD (INTERSTITIAL LUNG DISEASE) (MULTI): ICD-10-CM

## 2025-06-03 DIAGNOSIS — R97.20 INCREASED PROSTATE SPECIFIC ANTIGEN (PSA) VELOCITY: ICD-10-CM

## 2025-06-03 DIAGNOSIS — K50.919 CROHN'S DISEASE WITH COMPLICATION, UNSPECIFIED GASTROINTESTINAL TRACT LOCATION: ICD-10-CM

## 2025-06-03 DIAGNOSIS — R06.02 SOB (SHORTNESS OF BREATH): ICD-10-CM

## 2025-06-03 DIAGNOSIS — K21.9 GASTROESOPHAGEAL REFLUX DISEASE WITHOUT ESOPHAGITIS: ICD-10-CM

## 2025-06-03 DIAGNOSIS — I71.21 ANEURYSM OF ASCENDING AORTA WITHOUT RUPTURE: ICD-10-CM

## 2025-06-03 DIAGNOSIS — I10 PRIMARY HYPERTENSION: Primary | ICD-10-CM

## 2025-06-03 DIAGNOSIS — M1A.9XX0 CHRONIC GOUT WITHOUT TOPHUS, UNSPECIFIED CAUSE, UNSPECIFIED SITE: ICD-10-CM

## 2025-06-03 DIAGNOSIS — Q23.81 BICUSPID AORTIC VALVE: ICD-10-CM

## 2025-06-03 DIAGNOSIS — E78.2 HYPERLIPEMIA, MIXED: ICD-10-CM

## 2025-06-03 DIAGNOSIS — N18.31 STAGE 3A CHRONIC KIDNEY DISEASE (MULTI): ICD-10-CM

## 2025-06-03 DIAGNOSIS — Z23 NEED FOR VACCINATION: ICD-10-CM

## 2025-06-03 DIAGNOSIS — N52.9 ERECTILE DYSFUNCTION, UNSPECIFIED ERECTILE DYSFUNCTION TYPE: ICD-10-CM

## 2025-06-03 DIAGNOSIS — G43.009 MIGRAINE WITHOUT AURA AND WITHOUT STATUS MIGRAINOSUS, NOT INTRACTABLE: ICD-10-CM

## 2025-06-03 DIAGNOSIS — J84.10 PULMONARY FIBROSIS, UNSPECIFIED (MULTI): ICD-10-CM

## 2025-06-03 DIAGNOSIS — G47.33 OBSTRUCTIVE SLEEP APNEA: ICD-10-CM

## 2025-06-03 PROCEDURE — 90715 TDAP VACCINE 7 YRS/> IM: CPT | Performed by: FAMILY MEDICINE

## 2025-06-03 PROCEDURE — 3008F BODY MASS INDEX DOCD: CPT | Performed by: FAMILY MEDICINE

## 2025-06-03 PROCEDURE — 3078F DIAST BP <80 MM HG: CPT | Performed by: FAMILY MEDICINE

## 2025-06-03 PROCEDURE — 99214 OFFICE O/P EST MOD 30 MIN: CPT | Performed by: FAMILY MEDICINE

## 2025-06-03 PROCEDURE — 90471 IMMUNIZATION ADMIN: CPT | Performed by: FAMILY MEDICINE

## 2025-06-03 PROCEDURE — 3074F SYST BP LT 130 MM HG: CPT | Performed by: FAMILY MEDICINE

## 2025-06-03 RX ORDER — TELMISARTAN AND HYDROCHLOROTHIAZIDE 25; 80 MG/1; MG/1
1 TABLET ORAL DAILY
Qty: 90 TABLET | Refills: 0 | Status: SHIPPED | OUTPATIENT
Start: 2025-06-03

## 2025-06-03 RX ORDER — COLCHICINE 0.6 MG/1
0.6 TABLET ORAL DAILY
Qty: 90 TABLET | Refills: 0 | Status: SHIPPED | OUTPATIENT
Start: 2025-06-03

## 2025-06-03 NOTE — PROGRESS NOTES
Subjective   Patient ID: Mau Cisse is a 55 y.o. male who presents for Follow-up.    HPI     Patient reports seeing Dr. Carlton had labs done 05/21/2025.    Pt had a Tympanomastoidectomy of the left middle ear and mastoid and vestibulooplasty with Dr. Bautista and Dr. Chin 5/12/2025.    Patient c/o gout flare up on 3/6/2025 in the right foot and started back on the colchicine.       Review labs: 05/21/2025-PSA   Colon: 11/02/2022      Patient has hypertension.  He does not monitor BP at home.   Denies CP, SOB, dizziness, and LE edema.   Patient is compliant with his antihypertensive therapy and denies any noted side effects.     He has hyperlipidemia.  Patient has been compliant with his statin therapy and denies any noted side effects.  Pt does try to reduce the amount of cholesterol and fatty foods he consumes.     Pt has GERD.  Symptoms stable with use of omeprazole.   He denies any breakthrough reflux symptoms.     Patient has allergic rhinitis.  His allergies are well controlled with a combination of Zyrtec and fluticasone nasal spray.     Pt has gout.  Had a recent gout flare up 3/6/2025.  Pt back taking colchicine.       He has a history of osteoarthritis of both hips.  Has undergone bilat hip replacements.  Hips joints are feeling good.     Pt has Crohn's disease.  He sees GI (Dr. Richard) on a regular basis.   He is no longer on Cimzia injections and will be changing medications.  He currently denies any abdominal pain, nausea, vomiting, diarrhea, melena, or hematochezia.     Patient has migraine headaches.   He takes Maxalt as needed for his migraines.  Patient has been unable to take the generic version of Maxalt (rizatriptan) as it gives him dizziness, upset stomach, nausea, and lethargy.   He tolerates the brand-name medication without noted side effects.       Review of Systems  Constitutional: Patient denies any fever, chills, loss of appetite, or unexplained weight loss.  Cardiovascular: Patient  "denies any chest pain, shortness of breath with exertion, tachycardia, palpitations, orthopnea, or paroxysmal nocturnal dyspnea.  Respiratory: Patient denies any cough, shortness of breath, or wheezing.  Gastrointestinal: Patient denies any nausea, vomiting, diarrhea, constipation, melena, hematochezia, or reflux symptoms  Skin: Denies any rashes or skin lesions  Neurology: Patient denies any new motor or sensory losses. Denies any numbness, tingling, weakness, and incoordination of the extremities. Patient also denies any tremor, seizures, or gait instability.  Endocrinology: Denies any polyuria, polydipsia, polyphagia, or heat/cold intolerance.  Psychiatric: Patient denies any depression, anxiety, or suicidal/homicidal ideation.     Objective   /78   Pulse 96   Temp 36.5 °C (97.7 °F) (Temporal)   Ht 1.753 m (5' 9\")   Wt 100 kg (221 lb 4.8 oz)   SpO2 95%   BMI 32.68 kg/m²     Physical Exam  General Appearance: Alert and cooperative, in no acute distress, well-developed/well-nourished overweight male.    Neck: Supple and without adenopathy or rigidity. There is no JVD at 90° and no carotid bruits are noted. There is no thyromegaly, thyroid tenderness, or palpable thyroid nodules.  Heart: Regular rate and rhythm without murmur or ectopy.  Lungs: Clear to auscultation bilaterally with good air exchange.  Skin: Good turgor, moist, warm and without rashes or lesions.  Neurological exam: Alert and oriented ×3, no tremor, normal gait.  Extremities: No clubbing, cyanosis, or edema  Psychiatric: Appropriate mood and affect, good insight and judgment, no delusions or thought disorders, no suicidal or homicidal ideation    Assessment/Plan     Hypertension:   Stable based on in office readings.  We will continue with the current antihypertensive therapy.    Hyperlipidemia:   Patient will continue with the current medication.  Dietary changes, exercise, and maintenance of healthy weight were discussed at " length.  Lab Results   Component Value Date    CHOL 141 01/07/2025    HDL 37.4 01/07/2025    LDLCALC 84 01/07/2025    TRIG 96 01/07/2025    CHHDL 3.8 01/07/2025    VLDL 19 01/07/2025    NHDL 104 01/07/2025       GERD:  Stable based on symptoms.  Continue current dose of PPI therapy and appropriate dietary changes.    Migraine headaches:  Stable with the current plan.  Continue to use Maxalt as needed.  HE CAN NOT TOLERATE THE GENERIC FORM AS HE EXPERIENCES SIDE EFFECTS OF DIZZINESS, UPSET STOMACH, NAUSEA, AND LETHARGY.    Crohn's Disease:   Stable based on symptoms.  We will continue the current medication and continue to follow with his GI specialist.     Gout:  Had a gout flare up 3/6/2025.  Pt started back on colchicine.   We will continue to monitor.  We discussed goal of keeping his uric acid level below 6.    Lab Results   Component Value Date    URICACID 4.5 01/07/2025    URICACID 5.6 07/09/2024    URICACID 5.3 10/13/2023   Uric acid level at goal currently.     Stage 3a CKD with hx of right nephrectomy 2005) for renal CA:   Stable based on labs.  He was encouraged to avoid NSAIDs.   He is currently stable and will continue follow-up with nephrology as directed.     Obstructive sleep apnea:  He was started on CPAP but could not tolerate the pressure at 16 cm H2O.   Was changed to Auto pap and referred to sleep medicine.  Will continue with the auto CPAP.     SOB with exertion:  Symptoms have significantly improved.  Will continue to follow with pulmonology and cardiology at this point.     Aneurysm of the ascending aorta / Bicuspid aortic valve:   Will continue to follow with cardiology as directed.  Will need surgical repair at some point in the future.     Erectile dysfunction:  Stable based on symptoms.  Continue the sildenafil as needed.     Need for vaccination:  Tdap vaccine administered today.    Increased PSA velocity:  His PSA had increased from 0.9 to 2.9 in one year.   Follow up PSA shows further  increase from 2.9 up to 6 on 5/21/2025 labs.  We will consult urology for additional evaluation.  Referred him to Dr. Pratt (urology) for evaluation.   Lab Results   Component Value Date    PSA 6.0 (H) 05/21/2025        ILD/Pulmonary fibrosis:  Last CT scan ordered by pulmonology did not reveal any rachel findings of pulmonary fibrosis.    It was suspected that he may have had some pneumonitis from Cimzia which was being used to treat his colitis.  He will continue to follow with pulmonology as directed.     Scribe Attestation  By signing my name below, I, Alhaji Martinez   attest that this documentation has been prepared under the direction and in the presence of Leo Flores DO.     Orders Placed This Encounter   Procedures    Tdap vaccine, age 7 years and older  (BOOSTRIX)    Comprehensive Metabolic Panel    Lipid Panel    Uric acid    Referral to Urology     Requested Prescriptions     Signed Prescriptions Disp Refills    colchicine 0.6 mg tablet 90 tablet 0     Sig: Take 1 tablet (0.6 mg) by mouth once daily.    telmisartan-hydrochlorothiazid (MIcarDIS HCT) 80-25 mg tablet 90 tablet 0     Sig: Take 1 tablet by mouth once daily.

## 2025-06-03 NOTE — PATIENT INSTRUCTIONS
Follow up in 6 months with labs to be done PRIOR.    It was a pleasure to see you today. Thank you for choosing us for your health care needs.    If you have lab or other testing completed and have not been informed of results within one week, please call the office for your results.    If you haven't done so, consider signing up for [facility], the [facility] personal health record. Ask the staff how you can get started.

## 2025-06-04 ENCOUNTER — APPOINTMENT (OUTPATIENT)
Facility: CLINIC | Age: 55
End: 2025-06-04
Payer: COMMERCIAL

## 2025-06-09 DIAGNOSIS — I10 HTN (HYPERTENSION), BENIGN: ICD-10-CM

## 2025-06-09 PROBLEM — K50.90 CROHN'S DISEASE (MULTI): Status: RESOLVED | Noted: 2023-03-24 | Resolved: 2025-06-09

## 2025-06-09 PROBLEM — Z71.2 ENCOUNTER TO DISCUSS TEST RESULTS: Status: RESOLVED | Noted: 2024-04-05 | Resolved: 2025-06-09

## 2025-06-09 PROBLEM — Z98.890 PONV (POSTOPERATIVE NAUSEA AND VOMITING): Status: RESOLVED | Noted: 2024-07-29 | Resolved: 2025-06-09

## 2025-06-09 PROBLEM — M25.519 SHOULDER PAIN: Status: RESOLVED | Noted: 2023-04-27 | Resolved: 2025-06-09

## 2025-06-09 PROBLEM — R06.83 SNORING: Status: RESOLVED | Noted: 2023-04-27 | Resolved: 2025-06-09

## 2025-06-09 PROBLEM — M25.559 HIP PAIN: Status: RESOLVED | Noted: 2023-03-24 | Resolved: 2025-06-09

## 2025-06-09 PROBLEM — R11.2 PONV (POSTOPERATIVE NAUSEA AND VOMITING): Status: RESOLVED | Noted: 2024-07-29 | Resolved: 2025-06-09

## 2025-06-09 PROBLEM — M10.9 GOUTY ARTHRITIS OF TOE: Status: RESOLVED | Noted: 2022-09-23 | Resolved: 2025-06-09

## 2025-06-09 PROBLEM — J32.0 MAXILLARY SINUSITIS: Status: RESOLVED | Noted: 2023-05-02 | Resolved: 2025-06-09

## 2025-06-09 PROBLEM — J30.2 SEASONAL ALLERGIES: Status: RESOLVED | Noted: 2024-03-19 | Resolved: 2025-06-09

## 2025-06-09 PROBLEM — G43.909 MIGRAINE HEADACHE: Status: RESOLVED | Noted: 2023-03-24 | Resolved: 2025-06-09

## 2025-06-09 RX ORDER — ASPIRIN 81 MG/1
81 TABLET ORAL DAILY
Qty: 90 TABLET | Refills: 0 | Status: SHIPPED | OUTPATIENT
Start: 2025-06-09

## 2025-06-10 ENCOUNTER — OFFICE VISIT (OUTPATIENT)
Dept: ALLERGY | Facility: CLINIC | Age: 55
End: 2025-06-10
Payer: COMMERCIAL

## 2025-06-10 ENCOUNTER — APPOINTMENT (OUTPATIENT)
Dept: PULMONOLOGY | Facility: CLINIC | Age: 55
End: 2025-06-10
Payer: COMMERCIAL

## 2025-06-10 VITALS — BODY MASS INDEX: 32.49 KG/M2 | WEIGHT: 220 LBS

## 2025-06-10 DIAGNOSIS — J30.9 ALLERGIC RHINITIS, UNSPECIFIED SEASONALITY, UNSPECIFIED TRIGGER: Primary | ICD-10-CM

## 2025-06-10 PROCEDURE — 99213 OFFICE O/P EST LOW 20 MIN: CPT | Performed by: ALLERGY & IMMUNOLOGY

## 2025-06-10 PROCEDURE — 95117 IMMUNOTHERAPY INJECTIONS: CPT | Performed by: ALLERGY & IMMUNOLOGY

## 2025-06-10 NOTE — PATIENT INSTRUCTIONS
Shots administered today.    Continue Flonase as needed.    Follow up for shots as scheduled.  Otherwise, follow up in 1 year, unless symptoms arise sooner.

## 2025-06-10 NOTE — PROGRESS NOTES
"Subjective   Patient ID:   [id number]   Jamil Cisse \"Mau\" is a 55 y.o. male who presents for Allergic Rhinitis (Follow up, ) and Immunotherapy (Allergy shots given today ).    Chief Complaint   Patient presents with    Allergic Rhinitis     Follow up,     Immunotherapy     Allergy shots given today       Patient presents for annual F/U of allergic rhinitis.  Started IT July 2023.    Since last visit, 7-8-24, patient reports his shots are going well.  He states his allergy symptoms are much less compared to prior.    Patient is S/P tympanoplasty with mastoidectomy, removal of cholesteatoma and ossiculoplasty 5-12-25 by Dr. Bautista.    Patient is off work throughout the summer.    Objective   Wt 99.8 kg (220 lb)   BMI 32.49 kg/m²      Physical Exam  Constitutional:       Appearance: Normal appearance.   HENT:      Head: Normocephalic and atraumatic.      Right Ear: External ear normal.      Left Ear: External ear normal.      Nose: No congestion or rhinorrhea.   Eyes:      Extraocular Movements: Extraocular movements intact.      Conjunctiva/sclera: Conjunctivae normal.      Pupils: Pupils are equal, round, and reactive to light.   Cardiovascular:      Rate and Rhythm: Normal rate and regular rhythm.      Heart sounds: Murmur heard.      No friction rub. No gallop.   Pulmonary:      Effort: No respiratory distress.      Breath sounds: No wheezing, rhonchi or rales.   Skin:     General: Skin is warm and dry.   Neurological:      Mental Status: He is alert.   Psychiatric:         Mood and Affect: Mood normal.         Behavior: Behavior normal.     Assessment/Plan     Allergic rhinitis  Shots are going well and he denies local reactions.  His allergy symptoms are much less compared to prior.  He had surgery by Dr. Bautista 5-12-25 and did well.    Shots administered today.    He is improved with immunotherapy. Decreased symptoms and medication burden. No side effects. Plan to retest July 2028. He will continue monthly " immunotherapy until that time. He will continue Flonase as needed.    Donita De La Cruz MD

## 2025-06-16 DIAGNOSIS — K21.9 GASTROESOPHAGEAL REFLUX DISEASE WITHOUT ESOPHAGITIS: ICD-10-CM

## 2025-06-16 RX ORDER — OMEPRAZOLE 40 MG/1
40 CAPSULE, DELAYED RELEASE ORAL DAILY
Qty: 90 CAPSULE | Refills: 0 | Status: SHIPPED | OUTPATIENT
Start: 2025-06-16

## 2025-06-18 NOTE — ASSESSMENT & PLAN NOTE
Shots are going well and he denies local reactions.  His allergy symptoms are much less compared to prior.  He had surgery by Dr. Bautista 5-12-25 and did well.    Shots administered today.    He is improved with immunotherapy. Decreased symptoms and medication burden. No side effects. Plan to retest July 2028. He will continue monthly immunotherapy until that time. He will continue Flonase as needed.

## 2025-06-23 DIAGNOSIS — E78.2 HYPERLIPEMIA, MIXED: ICD-10-CM

## 2025-06-23 RX ORDER — ATORVASTATIN CALCIUM 20 MG/1
20 TABLET, FILM COATED ORAL DAILY
Qty: 90 TABLET | Refills: 0 | Status: SHIPPED | OUTPATIENT
Start: 2025-06-23

## 2025-06-30 DIAGNOSIS — J30.1 HAY FEVER: Primary | ICD-10-CM

## 2025-06-30 DIAGNOSIS — J30.89 PERENNIAL ALLERGIC RHINITIS: ICD-10-CM

## 2025-06-30 PROCEDURE — 95165 ANTIGEN THERAPY SERVICES: CPT | Performed by: ALLERGY & IMMUNOLOGY

## 2025-07-07 ENCOUNTER — APPOINTMENT (OUTPATIENT)
Dept: ALLERGY | Facility: CLINIC | Age: 55
End: 2025-07-07
Payer: COMMERCIAL

## 2025-07-07 DIAGNOSIS — J30.2 SEASONAL ALLERGIES: ICD-10-CM

## 2025-07-07 DIAGNOSIS — M1A.9XX0 CHRONIC GOUT WITHOUT TOPHUS, UNSPECIFIED CAUSE, UNSPECIFIED SITE: ICD-10-CM

## 2025-07-07 DIAGNOSIS — J30.1 SEASONAL ALLERGIC RHINITIS DUE TO POLLEN: Primary | ICD-10-CM

## 2025-07-07 PROCEDURE — 95117 IMMUNOTHERAPY INJECTIONS: CPT | Performed by: ALLERGY & IMMUNOLOGY

## 2025-07-07 RX ORDER — MONTELUKAST SODIUM 10 MG/1
10 TABLET ORAL NIGHTLY
Qty: 90 TABLET | Refills: 1 | Status: SHIPPED | OUTPATIENT
Start: 2025-07-07

## 2025-07-07 RX ORDER — FEBUXOSTAT 40 MG/1
40 TABLET, FILM COATED ORAL DAILY
Qty: 90 TABLET | Refills: 0 | Status: SHIPPED | OUTPATIENT
Start: 2025-07-07

## 2025-08-04 ENCOUNTER — APPOINTMENT (OUTPATIENT)
Dept: ALLERGY | Facility: CLINIC | Age: 55
End: 2025-08-04
Payer: COMMERCIAL

## 2025-08-04 DIAGNOSIS — J30.2 SEASONAL ALLERGIES: ICD-10-CM

## 2025-08-04 PROCEDURE — 95117 IMMUNOTHERAPY INJECTIONS: CPT | Performed by: ALLERGY & IMMUNOLOGY

## 2025-08-11 DIAGNOSIS — J30.9 ALLERGIC RHINITIS, UNSPECIFIED SEASONALITY, UNSPECIFIED TRIGGER: ICD-10-CM

## 2025-08-11 RX ORDER — FLUTICASONE PROPIONATE 50 MCG
2 SPRAY, SUSPENSION (ML) NASAL DAILY
Qty: 48 G | Refills: 1 | Status: SHIPPED | OUTPATIENT
Start: 2025-08-11

## 2025-08-13 ENCOUNTER — OFFICE VISIT (OUTPATIENT)
Dept: URGENT CARE | Age: 55
End: 2025-08-13
Payer: COMMERCIAL

## 2025-08-13 VITALS
RESPIRATION RATE: 18 BRPM | BODY MASS INDEX: 32.49 KG/M2 | WEIGHT: 220 LBS | OXYGEN SATURATION: 96 % | HEART RATE: 83 BPM | TEMPERATURE: 98.6 F | SYSTOLIC BLOOD PRESSURE: 170 MMHG | DIASTOLIC BLOOD PRESSURE: 92 MMHG

## 2025-08-13 DIAGNOSIS — M77.8 TENDONITIS OF BOTH WRISTS: Primary | ICD-10-CM

## 2025-08-13 PROCEDURE — 99213 OFFICE O/P EST LOW 20 MIN: CPT | Performed by: PHYSICIAN ASSISTANT

## 2025-08-13 PROCEDURE — 3077F SYST BP >= 140 MM HG: CPT | Performed by: PHYSICIAN ASSISTANT

## 2025-08-13 PROCEDURE — 3080F DIAST BP >= 90 MM HG: CPT | Performed by: PHYSICIAN ASSISTANT

## 2025-08-13 RX ORDER — PREDNISONE 10 MG/1
TABLET ORAL
Qty: 30 TABLET | Refills: 0 | Status: SHIPPED | OUTPATIENT
Start: 2025-08-13

## 2025-08-18 ENCOUNTER — APPOINTMENT (OUTPATIENT)
Facility: CLINIC | Age: 55
End: 2025-08-18
Payer: COMMERCIAL

## 2025-08-18 VITALS
TEMPERATURE: 98.2 F | HEART RATE: 86 BPM | RESPIRATION RATE: 18 BRPM | SYSTOLIC BLOOD PRESSURE: 156 MMHG | BODY MASS INDEX: 32.64 KG/M2 | DIASTOLIC BLOOD PRESSURE: 78 MMHG | OXYGEN SATURATION: 93 % | WEIGHT: 220.4 LBS | HEIGHT: 69 IN

## 2025-08-18 DIAGNOSIS — R59.1 LYMPHADENOPATHY: ICD-10-CM

## 2025-08-18 DIAGNOSIS — G47.33 OSA (OBSTRUCTIVE SLEEP APNEA): ICD-10-CM

## 2025-08-18 DIAGNOSIS — J84.9 ILD (INTERSTITIAL LUNG DISEASE) (MULTI): Primary | ICD-10-CM

## 2025-08-18 PROCEDURE — 3008F BODY MASS INDEX DOCD: CPT | Performed by: INTERNAL MEDICINE

## 2025-08-18 PROCEDURE — 99214 OFFICE O/P EST MOD 30 MIN: CPT | Performed by: INTERNAL MEDICINE

## 2025-08-18 PROCEDURE — 3078F DIAST BP <80 MM HG: CPT | Performed by: INTERNAL MEDICINE

## 2025-08-18 PROCEDURE — 3077F SYST BP >= 140 MM HG: CPT | Performed by: INTERNAL MEDICINE

## 2025-08-18 ASSESSMENT — COLUMBIA-SUICIDE SEVERITY RATING SCALE - C-SSRS
1. IN THE PAST MONTH, HAVE YOU WISHED YOU WERE DEAD OR WISHED YOU COULD GO TO SLEEP AND NOT WAKE UP?: NO
6. HAVE YOU EVER DONE ANYTHING, STARTED TO DO ANYTHING, OR PREPARED TO DO ANYTHING TO END YOUR LIFE?: NO
2. HAVE YOU ACTUALLY HAD ANY THOUGHTS OF KILLING YOURSELF?: NO

## 2025-09-02 DIAGNOSIS — I10 PRIMARY HYPERTENSION: ICD-10-CM

## 2025-09-02 RX ORDER — TELMISARTAN AND HYDROCHLOROTHIAZIDE 25; 80 MG/1; MG/1
1 TABLET ORAL DAILY
Qty: 90 TABLET | Refills: 0 | Status: SHIPPED | OUTPATIENT
Start: 2025-09-02

## 2025-09-09 ENCOUNTER — APPOINTMENT (OUTPATIENT)
Dept: ALLERGY | Facility: CLINIC | Age: 55
End: 2025-09-09
Payer: COMMERCIAL

## 2025-10-07 ENCOUNTER — APPOINTMENT (OUTPATIENT)
Dept: ALLERGY | Facility: CLINIC | Age: 55
End: 2025-10-07
Payer: COMMERCIAL

## 2025-12-01 ENCOUNTER — APPOINTMENT (OUTPATIENT)
Dept: UROLOGY | Facility: CLINIC | Age: 55
End: 2025-12-01
Payer: COMMERCIAL

## 2025-12-08 ENCOUNTER — APPOINTMENT (OUTPATIENT)
Dept: PRIMARY CARE | Facility: CLINIC | Age: 55
End: 2025-12-08
Payer: COMMERCIAL

## 2026-06-08 ENCOUNTER — APPOINTMENT (OUTPATIENT)
Facility: CLINIC | Age: 56
End: 2026-06-08
Payer: COMMERCIAL

## (undated) DEVICE — BURR, MICROMAX BALL FLUTED 5MM

## (undated) DEVICE — SUTURE, MONOCRYL, 4-0, 18 IN, PS2, UNDYED

## (undated) DEVICE — TUBING, SUCTION, CONNECTING, STERILE 0.25 X 120 IN., LF

## (undated) DEVICE — DRAPE, SHEET, 17 X 23 IN

## (undated) DEVICE — NEEDLE, HYPODERMIC, MONOJECT, 27 G X 1.5 IN

## (undated) DEVICE — HIGH FLOW TIP

## (undated) DEVICE — Device

## (undated) DEVICE — SUTURE MCRYL + SZ 3-0 L36IN ABSRB UD CT-1 L36MM 1/2 CIR MCP944H

## (undated) DEVICE — DRESSING, TRANSPARENT, TEGADERM, 8 X 12 IN

## (undated) DEVICE — PROTECTOR, NERVE, ULNAR, PINK

## (undated) DEVICE — CUP, SOLUTION

## (undated) DEVICE — STOCKINETTE, IMPERVIOUS, 12 X 48 IN, STERILE

## (undated) DEVICE — SUTURE, PLAIN, 5-0, 18 IN, PC1, YELLOW

## (undated) DEVICE — PACKING, PLAIN, CURITY, 0.25 IN X 5 YD, STERILE

## (undated) DEVICE — COVER, CART, 45 X 27 X 48 IN, CLEAR

## (undated) DEVICE — CLEANER, WIPE, INSTRUMENT, 3.25 X 3.25 IN

## (undated) DEVICE — PAD, GROUNDING, ELECTROSURGICAL, W/9 FT CABLE, POLYHESIVE II, ADULT, LF

## (undated) DEVICE — ELECTRODE, PAIRED SUBDERMAL OTO

## (undated) DEVICE — SPONGE, HEMOSTATIC, GELATIN, SURGIFOAM, 8 X 12.5 CM X 10 MM

## (undated) DEVICE — TUBING, SUCTION, OTOMED

## (undated) DEVICE — SUTURE VCRL SZ 1 L36IN ABSRB UD L36MM CT-1 1/2 CIR J947H

## (undated) DEVICE — SUTURE, VICRYL, 3-0,18 IN, SH, UNDYED

## (undated) DEVICE — GLOVE ORANGE PI 7 1/2   MSG9075

## (undated) DEVICE — ADVANCED FEMORAL PRESSURIZER, MEDIUM

## (undated) DEVICE — SIZERS, OTOLOGIC MOLDED, POLYPROPYLENE

## (undated) DEVICE — SIPS DUAL 2 MINUTE TIP

## (undated) DEVICE — SYRINGE, MONOJECT, LUER LOCK, 3 CC, LF

## (undated) DEVICE — CHLORAPREP 26ML ORANGE

## (undated) DEVICE — COMB, HAIR, 7 IN, PLASTIC, BLACK

## (undated) DEVICE — GLOVE ORANGE PI 8   MSG9080

## (undated) DEVICE — MANIFOLD, 4 PORT NEPTUNE STANDARD

## (undated) DEVICE — T4 HOOD

## (undated) DEVICE — SUTURE ETHBND EXCEL SZ 2 L30IN NONABSORBABLE GRN L40MM V-37 MX69G

## (undated) DEVICE — HIP PILLOW, ABDUCTION: Brand: DEROYAL

## (undated) DEVICE — 2000CC GUARDIAN II: Brand: GUARDIAN

## (undated) DEVICE — REST, HEAD, BAGEL, 9 IN

## (undated) DEVICE — Z DISCONTINUED PER MEDLINE USE 2741942 DRESSING AQUACEL 6 IN ALG W9XL15CM SIL CVR WTRPRF VIR BACT BARR ANTIMIC

## (undated) DEVICE — Z DISCONTINUED PER MEDLINE USE 2741943 DRESSING AQUACEL 10 IN ALG W9XL25CM SIL CVR WTRPRF VIR BACT BARR ANTIMIC

## (undated) DEVICE — SUTURE MCRYL SZ 4-0 L27IN ABSRB UD L19MM PS-2 1/2 CIR PRIM Y426H

## (undated) DEVICE — DRAPE, SURGICAL, OTOLOGY GLASSCOCK

## (undated) DEVICE — DRESSING, EAR, GLASSCOCK, ADULT

## (undated) DEVICE — TIBURON SPLIT SHEET: Brand: CONVERTORS

## (undated) DEVICE — TIBURON TOP SHEET: Brand: CONVERTORS

## (undated) DEVICE — PACK PROCEDURE SURG TOT HIP DRP

## (undated) DEVICE — SLEEVE, ELITE, IRRIGATION, 7CM

## (undated) DEVICE — DRAPE, SMARTDRAPE, FOR TIVATO MICROSCOPE

## (undated) DEVICE — BUR, COARSE DIAMOND, 3MM ROUND

## (undated) DEVICE — SYRINGE, 1 CC, LUER LOCK

## (undated) DEVICE — BALL, DIAMOND 5MM

## (undated) DEVICE — TRAY CATH CATH OD16FR BG F HYDROCOATED

## (undated) DEVICE — 3M™ STERI-DRAPE™ U-DRAPE 1015: Brand: STERI-DRAPE™

## (undated) DEVICE — DRAPE, INCISE, ANTIMICROBIAL, IOBAN 2, LARGE, 17 X 23 IN, DISPOSABLE, STERILE

## (undated) DEVICE — BUR, CUTTER, 5MM, ROUND

## (undated) DEVICE — BUR, COARSE DIAMOND, 2MM ROUND

## (undated) DEVICE — NEEDLE, HYPODERMIC, MONOJECT, TRI-BEVELED, ANTI-CORING, 27 G X 1.25 IN, LUER LOCK HUB, YELLOW

## (undated) DEVICE — STRIP, SKIN CLOSURE, STERI STRIP, REINFORCED, 0.5 X 4 IN

## (undated) DEVICE — LABEL MED MED CHPOR

## (undated) DEVICE — BUR, 3MM DIAMOND BALL STD

## (undated) DEVICE — TAPE, SILK, DURAPORE, 3 IN X 10 YD, LF

## (undated) DEVICE — CASSETTE, IRRIGATION, DISP

## (undated) DEVICE — ADMINISTRATION SET, VENTED, FLASHBALL, 109 IN

## (undated) DEVICE — ADHESIVE, SKIN, MASTISOL, 2/3 CC VIAL

## (undated) DEVICE — SUTURE RETRIEVER

## (undated) DEVICE — CATHETER, URETHRAL, MALECOT, 4 WING, 14 FR, LATEX

## (undated) DEVICE — NEEDLE, HYPODERMIC, 25 G X 1.5 IN, A BEVEL, STERILE

## (undated) DEVICE — WAX, BONE, 2.5 GM

## (undated) DEVICE — BOWL, UTILITY, 32 OZ, PLASTIC, STERILE

## (undated) DEVICE — ELECTRODE PT RET AD L9FT HI MOIST COND ADH HYDRGEL CORDED

## (undated) DEVICE — ADHESIVE, SKIN, LIQUIBAND EXCEED

## (undated) DEVICE — BAND, RUBBER, 3 IN, STERILE

## (undated) DEVICE — SUTURE, MONOCRYLIC, 4-0, P3, MONO 18

## (undated) DEVICE — CATHETER, IV, ANGIOCATH, 20 G X 1.88 IN, FEP POLYMER

## (undated) DEVICE — BLADE SAW W6.3XL60MM THK0.64MM CUT THK1.04MM REPL SHT RECIP

## (undated) DEVICE — BOWL, BASIN, 32 OZ, STERILE